# Patient Record
Sex: FEMALE | Race: WHITE | NOT HISPANIC OR LATINO | Employment: UNEMPLOYED | URBAN - METROPOLITAN AREA
[De-identification: names, ages, dates, MRNs, and addresses within clinical notes are randomized per-mention and may not be internally consistent; named-entity substitution may affect disease eponyms.]

---

## 2019-12-04 ENCOUNTER — HOSPITAL ENCOUNTER (EMERGENCY)
Facility: HOSPITAL | Age: 84
Discharge: HOME | End: 2019-12-04
Attending: EMERGENCY MEDICINE
Payer: COMMERCIAL

## 2019-12-04 VITALS
RESPIRATION RATE: 18 BRPM | TEMPERATURE: 97.9 F | SYSTOLIC BLOOD PRESSURE: 120 MMHG | DIASTOLIC BLOOD PRESSURE: 60 MMHG | HEART RATE: 87 BPM | WEIGHT: 144 LBS | OXYGEN SATURATION: 97 %

## 2019-12-04 DIAGNOSIS — R23.8 BLISTERS OF MULTIPLE SITES: ICD-10-CM

## 2019-12-04 DIAGNOSIS — R21 RASH: Primary | ICD-10-CM

## 2019-12-04 DIAGNOSIS — R23.8 BULLAE: ICD-10-CM

## 2019-12-04 PROCEDURE — 99283 EMERGENCY DEPT VISIT LOW MDM: CPT | Performed by: EMERGENCY MEDICINE

## 2019-12-04 PROCEDURE — 99282 EMERGENCY DEPT VISIT SF MDM: CPT

## 2019-12-04 RX ORDER — PREDNISONE 10 MG/1
40 TABLET ORAL DAILY
Qty: 40 TABLET | Refills: 0 | Status: SHIPPED | OUTPATIENT
Start: 2019-12-04 | End: 2019-12-20

## 2019-12-05 NOTE — ED ATTENDING ATTESTATION
Wilburn Ganser MD, saw and evaluated the patient  All available labs and X-rays were ordered by me or the resident and have been reviewed by myself  I discussed the patient with the resident / non-physician and agree with the resident's / non-physician practitioner's findings and plan as documented in the resident's / non-physician practicitioner's note, except where noted  At this point, I agree with the current assessment done in the ED  I was present during key portions of all procedures performed unless otherwise stated  Chief Complaint   Patient presents with    Blister     blisters over entire body for 4 months, using triamcinolone cream no relief, no fever     This is an 80 y/o F presenting with numular eczema with bullous pemphoid  She has been having this rash for months, itchy, all over her body  It's beefy red  There are lesions on the hands looking bullous  It's not particularly bothering her  No f/ch/n/v/cp/sob  No blood in stool  No problems having a BM  Denies any urinary tract infection symptoms (burning, itching, pain, blood, frequency)  Denies any upper respiratory tract infection symptoms (cough, congestion, rhinorrhea, sore throat)  No oral involvement  No dizzienss/LH  No new medications  She's actually not using her glaucoma medications b/c she thought it might be related  No tongue involvement  No wheezing  Exam looks okay except for areas on arms/legs looking like bullous with eczema  Will do steroids, discuss derm  No one else has similar  Owns 3 cats  No new meds  She is on a latanoprost              Vitals:    12/04/19 1933 12/04/19 2035 12/04/19 2153   BP: 157/80  120/60   BP Location: Left arm  Left arm   Pulse: 91  87   Resp: (!) 24  18   Temp: (!) 95 6 °F (35 3 °C) 97 9 °F (36 6 °C)    TempSrc: Tympanic Oral    SpO2: 97%  97%   Weight: 65 3 kg (144 lb)     - 13 point ROS was performed and all are normal unless stated in the history above     - Nursing note reviewed  Vitals reviewed  - Orders placed by myself and/or advanced practitioner / resident     - Previous chart was reviewed  - No language barrier    - History obtained from jeovany patient  - There are no limitations to the history obtained  - Critical care time: Not applicable for this patient  Code Status: No Order  Advance Directive and Living Will:      Power of :    POLST:      Final Diagnosis:  1  Rash    2  Blisters of multiple sites    3  Bullae        ED Course as of Dec 04 2208   Wed Dec 04, 2019   2107 This is an 81         Medications - No data to display  No orders to display     No orders of the defined types were placed in this encounter  Labs Reviewed - No data to display  Time reflects when diagnosis was documented in both MDM as applicable and the Disposition within this note     Time User Action Codes Description Comment    12/4/2019  9:54 PM Ellen Janus Add [R21] Rash     12/4/2019  9:54 PM Ellen Janus Add [R23 8] Blisters of multiple sites     12/4/2019  9:54 PM Ellen Janus Add [R23 8] Bullae       ED Disposition     ED Disposition Condition Date/Time Comment    Discharge Good Wed Dec 4, 2019  9:53 PM July Worthington discharge to home/self care  Follow-up Information     Follow up With Specialties Details Why Contact Info Additional Information    Vicente Abbasi MD Dermatology Call in 1 day The office should call you tomorrow for an appointment  [phone]  [facility] Emergency Department Emergency Medicine Go to  If symptoms worsen   [address]  [phone] [facility], [address] [place] [address], [address] [phone]        Patient's Medications   Discharge Prescriptions    PREDNISONE 10 MG TABLET    Take 4 tablets (40 mg total) by mouth daily for 16 days Take 40 mg for 4 days, 30 mg for 4 days, 20 mg for 4 days, and 10 mg for 4 days  Start Date: 12/4/2019 End Date: 12/20/2019       Order Dose: 40 mg       Quantity: 40 tablet    Refills: 0     No discharge procedures on file  None       Portions of the record may have been created with voice recognition software  Occasional wrong word or "sound a like" substitutions may have occurred due to the inherent limitations of voice recognition software  Read the chart carefully and recognize, using context, where substitutions have occurred      Electronically signed by:  Roberto Turner

## 2019-12-05 NOTE — ED PROVIDER NOTES
History  Chief Complaint   Patient presents with    Blister     blisters over entire body for 4 months, using triamcinolone cream no relief, no fever     28-year-old female with past medical history of coronary artery disease status post PCI, CVA, hypertension, hyperlipidemia, and borderline diabetes who is presenting with rash  Patient reports that since August, 4 months ago, she has had a rash  The rash initially began on her bilateral lower legs  It then spread to her upper legs and bilateral upper extremities  Patient reports that she has had frequent blistering, particularly on her hands and feet  The blisters are tense but will sometimes spontaneously open up  Patient states the blisters are painful but the rash itself is not  The rash is pruritic  Patient has been placed on multiple courses of oral corticosteroids as well as triamcinolone cream without improvement  She did have a punch biopsy performed which demonstrated findings consistent with possible eczema  Patient has not yet seen a dermatologist but does have an appointment for January  Patient reports she is having difficulty walking and doing activities of daily living due to discomfort related to her rash  Patient denies any oral, ocular, or vaginal involvement with the rash  Patient denies any fever, chills, night sweats, unintentional weight loss, chest pain, shortness of breath, abdominal pain, or any other complaints at this time  Patient denies any new medications other than medications she was placed on to treat the rash  She has had no exposures to new soaps, creams, or detergents  No other complaints on review of systems  None       Past Medical History:   Diagnosis Date    Cardiac disease     2 stents    Diabetes mellitus ([facility] [address] )     borderline    Hyperlipidemia     Hypertension     Stroke [facility])        History reviewed  No pertinent surgical history  History reviewed  No pertinent family history    I have reviewed and agree with the history as documented  Social History     Tobacco Use    Smoking status: Former Smoker   Substance Use Topics    Alcohol use: Not Currently    Drug use: Not on file        Review of Systems   Constitutional: Negative for diaphoresis, fever and unexpected weight change  HENT: Negative for congestion, rhinorrhea and sore throat  Eyes: Negative for pain, discharge and visual disturbance  Respiratory: Negative for cough, shortness of breath and wheezing  Cardiovascular: Negative for chest pain, palpitations and leg swelling  Gastrointestinal: Negative for abdominal pain, blood in stool, constipation, diarrhea, nausea and vomiting  Genitourinary: Negative for dysuria, flank pain and hematuria  Musculoskeletal: Negative for arthralgias and joint swelling  Skin: Positive for rash  Negative for wound  Allergic/Immunologic: Negative for environmental allergies and food allergies  Neurological: Negative for dizziness, seizures, weakness and numbness  Hematological: Negative for adenopathy  Psychiatric/Behavioral: Negative for confusion and hallucinations  Physical Exam  ED Triage Vitals [12/04/19 1933]   Temperature Pulse Respirations Blood Pressure SpO2   (!) 95 6 °F (35 3 °C) 91 (!) 24 157/80 97 %      Temp Source Heart Rate Source Patient Position - Orthostatic VS BP Location FiO2 (%)   Tympanic Monitor Standing Left arm --      Pain Score       No Pain             Orthostatic Vital Signs  Vitals:    12/04/19 1933 12/04/19 2153   BP: 157/80 120/60   Pulse: 91 87   Patient Position - Orthostatic VS: Standing Lying       Physical Exam   Constitutional: She is oriented to person, place, and time  She appears well-developed and well-nourished  No distress  HENT:   Head: Normocephalic and atraumatic  Right Ear: External ear normal    Left Ear: External ear normal    Eyes: Pupils are equal, round, and reactive to light   Conjunctivae and EOM are normal    Neck: Normal range of motion  Neck supple  Cardiovascular: Normal rate, regular rhythm and normal heart sounds  No murmur heard  Pulmonary/Chest: Effort normal and breath sounds normal  No respiratory distress  She has no wheezes  She has no rales  Abdominal: Soft  Bowel sounds are normal  She exhibits no distension  There is no tenderness  There is no guarding  Musculoskeletal: Normal range of motion  She exhibits no deformity  Neurological: She is alert and oriented to person, place, and time  No gross motor deficits noted  Cranial nerves II-XII are intact  Speech is fluent without dysarthria or aphasia  Skin: Skin is warm and dry  Capillary refill takes less than 2 seconds  Rash noted  There is erythema  There is an erythematous, blanching, raised rash involving the bilateral arms and bilateral legs  There are scattered areas of rash on the back and upper chest as well  There are tense bullae located on the bilateral hands and bilateral feet  Some of these bullae have broken  No mucous membrane involvement  Psychiatric: She has a normal mood and affect  Her behavior is normal    Nursing note and vitals reviewed  ED Medications  Medications - No data to display    Diagnostic Studies  Results Reviewed     None                 No orders to display         Procedures  Procedures      ED Course           Identification of Seniors at Risk      Most Recent Value   (ISAR) Identification of Seniors at Risk   Before the illness or injury that brought you to the Emergency, did you need someone to help you on a regular basis? 0 Filed at: 12/04/2019 1938   In the last 24 hours, have you needed more help than usual?  0 Filed at: 12/04/2019 1913   Have you been hospitalized for one or more nights during the past 6 months? 0 Filed at: 12/04/2019 1938   In general, do you see well?  0 Filed at: 12/04/2019 1938   In general, do you have serious problems with your memory?   0 Filed at: 12/04/2019 1938   Do you take more than three different medications every day? 1 Filed at: 12/04/2019 1938   ISAR Score  1 Filed at: 12/04/2019 1938                          MDM  Number of Diagnoses or Management Options  Blisters of multiple sites: established and worsening  Bullae: established and worsening  Rash: established and worsening  Diagnosis management comments:     Patient presented with rash as described above  The rash has been present for several months but had been worsening  Patient had tried multiple courses of oral corticosteroids and topical corticosteroids without relief  There was no mucous membrane involvement  Patient had numerous bullae located primarily on the hands and feet  These bullae were tense  Physical examination was otherwise significant for a diffuse erythematous, blanching, raised rash on the bilateral arms and bilateral legs  Clinical presentation highly concerning for bullous pemphigoid  Contacted Dr Kristin Leventhal from Dermatology to discuss the case  I sent him of photographs of the rash  He agreed with our assessment of the rash  He agreed that the patient would likely benefit from a course of oral corticosteroids  His office will call the patient tomorrow and make a follow-up appointment for later this week or early next week  This was discussed with the patient and her granddaughter at bedside  They verbalized understanding of the possible diagnosis, plan for treatment, need for follow-up, and return precautions         Amount and/or Complexity of Data Reviewed  Decide to obtain previous medical records or to obtain history from someone other than the patient: yes  Obtain history from someone other than the patient: yes  Review and summarize past medical records: yes  Discuss the patient with other providers: yes    Risk of Complications, Morbidity, and/or Mortality  Presenting problems: low  Diagnostic procedures: minimal  Management options: minimal    Patient Progress  Patient progress: stable        Disposition  Final diagnoses:   Rash   Blisters of multiple sites   Bullae     Time reflects when diagnosis was documented in both MDM as applicable and the Disposition within this note     Time User Action Codes Description Comment    12/4/2019  9:54 PM Joaquina Battles Add [R21] Rash     12/4/2019  9:54 PM Joaquina Battles Add [R23 8] Blisters of multiple sites     12/4/2019  9:54 PM Joaquina Battles Add [R23 8] Bullae       ED Disposition     ED Disposition Condition Date/Time Comment    Discharge Good Wed Dec 4, 2019  9:53 PM Ofe Shah discharge to home/self care  Follow-up Information     Follow up With Specialties Details Why Contact Info Additional Information    Ariana Whaley MD Dermatology Call in 1 day The office should call you tomorrow for an appointment  [address] Emergency Department Emergency Medicine Go to  If symptoms worsen  [address]  [phone]  ED, [address] [place] [address], [address]   [phone]          Discharge Medication List as of 12/4/2019  9:58 PM      START taking these medications    Details   predniSONE 10 mg tablet Take 4 tablets (40 mg total) by mouth daily for 16 days Take 40 mg for 4 days, 30 mg for 4 days, 20 mg for 4 days, and 10 mg for 4 days  , Starting Wed 12/4/2019, Until Fri 12/20/2019, Print           No discharge procedures on file  ED Provider  Attending physically available and evaluated Ofe Shah I managed the patient along with the ED Attending      Electronically Signed by         Meera Mckeon MD  12/05/19 2791

## 2019-12-05 NOTE — DISCHARGE INSTRUCTIONS
The Dermatology office should call you with an appointment tomorrow  If you do not hear from them by the late afternoon, call the office number listed above  Take prednisone as prescribed  I have prescribed a taper  The instructions are in the prescription  Return to the ER if you have any fever, shaking chills, blisters involving your lips or mouth, or any other concerning symptoms

## 2019-12-11 ENCOUNTER — CONSULT (OUTPATIENT)
Dept: DERMATOLOGY | Facility: CLINIC | Age: 84
End: 2019-12-11
Payer: COMMERCIAL

## 2019-12-11 VITALS — BODY MASS INDEX: 22.88 KG/M2 | WEIGHT: 134 LBS | HEIGHT: 64 IN | TEMPERATURE: 98.5 F

## 2019-12-11 DIAGNOSIS — L12.0 BULLOUS PEMPHIGOID: Primary | ICD-10-CM

## 2019-12-11 PROCEDURE — 11104 PUNCH BX SKIN SINGLE LESION: CPT | Performed by: DERMATOLOGY

## 2019-12-11 PROCEDURE — 88300 SURGICAL PATH GROSS: CPT | Performed by: STUDENT IN AN ORGANIZED HEALTH CARE EDUCATION/TRAINING PROGRAM

## 2019-12-11 PROCEDURE — 88350 IMFLUOR EA ADDL 1ANTB STN PX: CPT | Performed by: DERMATOLOGY

## 2019-12-11 PROCEDURE — 88305 TISSUE EXAM BY PATHOLOGIST: CPT | Performed by: STUDENT IN AN ORGANIZED HEALTH CARE EDUCATION/TRAINING PROGRAM

## 2019-12-11 PROCEDURE — 88346 IMFLUOR 1ST 1ANTB STAIN PX: CPT | Performed by: DERMATOLOGY

## 2019-12-11 PROCEDURE — 36415 COLL VENOUS BLD VENIPUNCTURE: CPT | Performed by: DERMATOLOGY

## 2019-12-11 PROCEDURE — 99204 OFFICE O/P NEW MOD 45 MIN: CPT | Performed by: DERMATOLOGY

## 2019-12-11 RX ORDER — HYDROXYZINE HYDROCHLORIDE 10 MG/1
TABLET, FILM COATED ORAL
Refills: 3 | COMMUNITY
Start: 2019-10-17

## 2019-12-11 RX ORDER — BRIMONIDINE TARTRATE, TIMOLOL MALEATE 2; 5 MG/ML; MG/ML
1 SOLUTION/ DROPS OPHTHALMIC EVERY 12 HOURS
COMMUNITY

## 2019-12-11 RX ORDER — SIMVASTATIN 20 MG
TABLET ORAL
Refills: 1 | COMMUNITY
Start: 2019-11-04

## 2019-12-11 RX ORDER — AMLODIPINE BESYLATE 5 MG/1
5 TABLET ORAL DAILY
COMMUNITY
End: 2020-06-06 | Stop reason: HOSPADM

## 2019-12-11 RX ORDER — METOPROLOL TARTRATE 50 MG/1
25 TABLET, FILM COATED ORAL
COMMUNITY
End: 2020-06-06 | Stop reason: HOSPADM

## 2019-12-11 RX ORDER — LISINOPRIL 2.5 MG/1
2.5 TABLET ORAL DAILY
COMMUNITY
Start: 2019-11-04 | End: 2020-06-06 | Stop reason: HOSPADM

## 2019-12-11 RX ORDER — TRIAMCINOLONE ACETONIDE 1 MG/G
CREAM TOPICAL
Refills: 0 | COMMUNITY
Start: 2019-11-14 | End: 2019-12-11

## 2019-12-11 NOTE — PATIENT INSTRUCTIONS
Based on a thorough discussion of this condition and the management approach to it (including a comprehensive discussion of the known risks, side effects and potential benefits of treatment), the patient (family) agrees to implement the following specific plan:   Obtain blood work   Biopsy was done today; will call with results with in 2 weeks    Continue the prednisone as prescribed   Continue the triamcinolone ointment twice a day     Start using Dove moisturizer bar soap in the shower    Use moisturizer like Cerave  Cream 3 times a day for the dry skin           What is bullous pemphigoid? Bullous pemphigoid is an autoimmune disease, meaning that your own body's immune system attacks components of the skin causing it to form blisters  It normally affects people over [de-identified]years old, but can also appear in young adults  There is no gender preference  What causes bullous pemphigoid? Many times there is no identifiable cause for bullous pemphigoid  Some medications such as anti-PD1 inhibitors used in chemotherapy have been implicated in the development of bullous pemphigoid  Genetic predisposition for autoimmune diseases is also a risk factor  For example, patients with psoriasis, especially those treated with phototherapy, are susceptible  There is also an association with neurological disease  Sometimes, injuries or infections may also trigger bullous pemphigoid  What are the symptoms of bullous pemphigoid? Bullous pemphigoid results in severe itchiness and large fluid-filled blisters that eventually rupture, forming crusted sores   Other symptoms include:   Nonspecific rash for several weeks before blisters appear   Red patches of skin resembling nummular dermatitis   Ring-shaped lesions on the skin    Smaller blisters (vesicles)   Clear or cloudy, yellowish or bloodstained blister fluid   Postinflammatory pigmentation   Milia in healed areas  The symptoms can be localized or widespread over the trunk and mearby limbs  Oral and genital involvement is less common  How do we diagnose bullous pemphigoid? The presence of typical signs and symptoms of bullous pemphigoid raise suspicion for the disease  Your dermatologist will likely confirm the diagnosis via skin biopsy of an early blister  When subject to direct immunofluorescence studies, antibodies against skin membranes are highlighted  Blood tests can also be done to check for circulating pemphigoid antibodies  How do we treat bullous pemphigoid? Since there is a wide spectrum of severity, many treatment options exist  First, if bullous pemphigoid is a medication reaction, then it is necessary to stop the offending medication  Other options are:   - Potent topical steroids for limited disease   - Moderate potency topical steroids to relieve dryness and itchiness  - Systemic steroids in severe and widespread blistering   - Antibiotics are used if there is a secondary bacterial infection   Most patients with bullous pemphigoid are treated with steroid tablets, usually prednisone  The dose is adjusted until the blisters have stopped appearing, which usually takes several weeks  The dose of prednisone is then slowly reduced over many months or years  As oral steroids have many undesirable side effects, other medications are added to ensure the lowest possible dose (aiming for 5 to 10 mg prednisone daily)  These other medications may include:   Tetracycline antibiotics, such as doxycycline   Nicotinamide   Dapsone   Methotrexate   Azathioprine   Mycophenolate   Intravenous immunoglobulin   Rituximab  If bullous pemphigoid is severe enough, hospitalization and admission to a burn unit may be warranted  SKIN PUNCH BIOPSY    Rationale for Procedure  A skin punch biopsy allows the dermatologist to further examine a lesion or rash under the microscope to potentially obtain a more specific diagnosis    It usually involves numbing the area with numbing medication and removing a small piece of skin  Usually, the area will be closed with sutures at the end of the procedure  Sutures usually need to be removed in 5 to 7 days of the biopsy was performed on the face or in 10 to 14 days if performed elsewhere on the body  Description of Procedure  We would like to perform a skin punch biopsy today  A local anesthetic, similar to the kind that a dentist uses when filling a cavity, will be injected with a very small needle into the skin area to be sampled  The injected skin and tissue underneath should go to sleep and become numbed so that no further pain should be felt  An instrument shaped like a tiny "cookie cutter" (i e , the punch biopsy instrument) will be used to cut a small round piece of skin and tissue from the area so that the sample may be taken and examined more closely under the microscope  A slight amount of bleeding will occur, but it is usually stopped with direct pressure, chemical cautery, and/or a pressure bandage; rarely, electrocautery and other means of intervention may be necessary to help stop the bleeding  A few sutures/stitches are usually applied to help aid in wound healing  Surgical Vaseline-type ointment will also applied after the procedure to help create a barrier between the wound and the outside world      Risks and Potential Complications  While the advantage of a skin punch biopsy is that it allows us to potentially examine the skin more closely under the microscope, there are some risks and potential complications that include but are not limited to the following:  - Bleeding  - Infection  - Pain  - Scar/keloid  - Skin discoloration  - Incomplete removal of the lesion or rash being sampled (in other words, this procedure is intended as a sampling and is not considered a definitive treatment)  - Recurrence of the lesion or rash being sampled  - Nerve Damage/Numbness/Loss of Function  - Allergic Reaction to Anesthesia  - Biopsies are diagnostic procedures and, based on findings, additional treatment or evaluation may be required (in other words, this procedure is intended as a sampling and is not considered a definitive treatment)  - Loss or destruction of the sample specimen could result in no additional findings  - The person at the microscope may not be able to provide additional information other than what we already know or suspect  What You Will Need to Do After the Procedure  1  Keep the area clean and dry  Try NOT to remove the bandage for the first 24 hours  2  Gently clean the area and apply Vaseline ointment (this is over the counter and not a prescription) to the biopsy site for up to 2 weeks  3  If any sutures were placed, return for suture removal as instructed (generally 1 week for the face, 2 weeks for the body)  4  Take Acetaminophen (Tylenol) for discomfort, if no contraindications  Do NOT take Ibuprofen or aspirin unless specifically told to do so by your Dermatologist because these medications can make bleeding worse  5  Call our office immediately for signs of infection: fever, chills, increased redness, warmth, tenderness, discomfort/pain, or pus or foul smell coming from the wound  If a small amount of bleeding is noticed, place a clean cloth over the area and apply firm pressure for ten minutes  Check the wound ONLY after 10 minutes of direct pressure; do not cheat and sneak a peak, as that does not count  If bleeding persists after 10 minutes of legitimate direct pressure, then try one more round of direct pressure for an additional 10 minutes to the area  Should the bleeding become heavier or not stop after the second attempt, call [facility] Dermatology directly at [phone] (SKIN) or, if after hours, go to your local Emergency Room/Emergency Department

## 2019-12-11 NOTE — PROGRESS NOTES
[address] [facility] Note     Patient Name: Mauricio Aleman  Encounter Date: 12/11/2019    Today's Chief Concerns:  Daniel Tran Concern #1:  blisters      Past Medical History:  Have you ever had or currently have any of the following medical conditions or treatments? · HIV/AIDS: No  · Hepatitis B: No  · Hepatitis C: No   · Diabetes: YES  · Tuberculosis: No  · Biologic Therapy/Chemotherapy: No  · Organ or Bone Marrow Transplantation: No  · Radiation Treatment: No  · Cancer (If Yes, which types)- No   · Hypertension: yes  · Stroke: yes  · Cardiac disease: yes  · Hyperlipidemia: yes      Have you ever had any of the following skin conditions? · Melanoma? (If Yes, please provide more detail)- No  · Basal Cell Carcinoma: No  · Squamous Cell Carcinoma: No  · Sebaceous Cell Carcinoma: No  · Merkel Cell Carcinoma: No  · Angiosarcoma: No  · Blistering Sunburns: No  · Eczema: No  · Psoriasis: No    Social History:    What is your current Smoking Status? Former smoker    What is/was your primary occupation? retired    What are your hobbies/past-times? Family history:  Do any of your "first degree relatives" (parent, brother, sister, or child) have any of the following conditions? · Melanoma? (If Yes, which relatives?) No  · Eczema: No  · Asthma: No  · Hay Fever/Seasonal Allergies: No  · Psoriasis: No  · Arthritis: No  · Thyroid Problems: No  · Lupus/Connective Tissue Disease: No  · Diabetes: No  · Stroke: No  · Blood Clots: No  · IBD/Crohn's/Ulcerative Colitis: No  · Vitiligo: No  · Scarring/Keloids: No  · Severe Acne: No  · Pancreatic Cancer: No  · Other known Skin Condition? If Yes, what condition and which relatives?   No    Current Medications:    Current Outpatient Medications:     amLODIPine (NORVASC) 5 mg tablet, Take 5 mg by mouth daily, Disp: , Rfl:     ASPIRIN 81 PO, Take 81 mg by mouth, Disp: , Rfl:     brimonidine-timolol (COMBIGAN) 0 2-0 5 %, Apply 1 drop to eye every 12 (twelve) hours, Disp: , Rfl:   hydrOXYzine HCL (ATARAX) 10 mg tablet, TAKE ONE TABLET BY MOUTH THREE TIMES A DAY AS NEEDED FOR ITCHING, Disp: , Rfl: 3    Latanoprost 0 005 % EMUL, Apply 1 drop to eye, Disp: , Rfl:     lisinopril (ZESTRIL) 2 5 mg tablet, Take 2 5 mg by mouth daily, Disp: , Rfl:     metFORMIN (GLUCOPHAGE) 500 mg tablet, Take 500 mg by mouth, Disp: , Rfl:     metoprolol tartrate (LOPRESSOR) 50 mg tablet, Take 25 mg by mouth, Disp: , Rfl:     mupirocin (BACTROBAN) 2 % ointment, APPLY TWICE DAILY TO SURGICAL SITE FOR 2 WEEKS, Disp: , Rfl: 0    predniSONE 10 mg tablet, Take 4 tablets (40 mg total) by mouth daily for 16 days Take 40 mg for 4 days, 30 mg for 4 days, 20 mg for 4 days, and 10 mg for 4 days  , Disp: 40 tablet, Rfl: 0    simvastatin (ZOCOR) 20 mg tablet, TAKE ONE-HALF TABLET BY MOUTH EVERY EVENING, Disp: , Rfl: 1    Specific Alerts:    Have you been seen by a [facility] Dermatologist in the last 3 years? No    Are you pregnant or planning to become pregnant? No    Are you currently or planning to be nursing or breast feeding? No    Allergies   Allergen Reactions    Losartan     Sulfa Antibiotics        May we call your Preferred Phone number to discuss your specific medical information? YES    May we leave a detailed message that includes your specific medical information? YES    Have you traveled outside of the [facility] in the past 3 months? No    Do you currently have a pacemaker or defibrillator? No    Do you have any artificial heart valves, joints, plates, screws, rods, stents, pins, etc? No   - If Yes, were any placed within the last 2 years? Do you require any medications prior to a surgical procedure? No   - If Yes, for which procedure? - If Yes, what medications to you require? Are you taking any medications that cause you to bleed more easily ("blood thinners") No    Have you ever experienced a rapid heartbeat with epinephrine?  No    Have you ever been treated with "gold" (gold sodium thiomalate) therapy? No    [facility] can help with wrinkles, "laugh lines," facial volume loss, "double chin," "love handles," age spots, and more  Are you interested in learning today about some of the skin enhancement procedures that we offer? (If Yes, please provide more detail) No    Review of Systems:  Have you recently had or currently have any of the following? · Fever or chills: No  · Night Sweats: No  · Headaches: No  · Weight Gain: No  · Weight Loss: No  · Blurry Vision: No  · Nausea: No  · Vomiting: No  · Diarrhea: No  · Blood in Stool: No  · Abdominal Pain: No  · Itchy Skin: No  · Painful Joints: No  · Swollen Joints: No  · Muscle Pain: No  · Irregular Mole: No  · Sun Burn: No  · Dry Skin: No  · Skin Color Changes: No  · Scar or Keloid: No  · Cold Sores/Fever Blisters: No  · Bacterial Infections/MRSA: No  · Anxiety: No  · Depression: No  · Suicidal or Homicidal Thoughts: No      PHYSICAL EXAM:      Was a chaperone (Derm Clinical Assistant) present for the entirety of the Physical Exam? YES    Did the Dermatology Team specifically ask and  the patient on the importance of a Full Skin Exam to be sure that nothing is missed clinically?  YES    Did the patient request or accept a Full Skin Exam?  YES    Did the patient specifically refuse to have the areas "under-the-bra" examined by the Dermatologist? No    Did the patient specifically refuse to have the areas "under-the-underwear" examined by the Dermatologist? No      CONSTITUTIONAL:   Vitals:    12/11/19 1455   Temp: 98 5 °F (36 9 °C)   Weight: 60 8 kg (134 lb)   Height: 5' 4" (1 626 m)           PSYCH: Normal mood and affect  EYES: Normal conjunctiva  ENT: Normal lips and oral mucosa  CARDIOVASCULAR: No edema  RESPIRATORY: Normal respirations  HEME/LYMPH/IMMUNO:  No regional lymphadenopathy except as noted below in ASSESSMENT AND PLAN BY DIAGNOSIS    FULL ORGAN SYSTEM SKIN EXAM (SKIN)  Hair, Scalp, Ears, Face Normal except as noted below in Assessment   Neck, Cervical Chain Nodes Normal except as noted below in Assessment   Right Arm/Hand/Fingers Normal except as noted below in Assessment   Left Arm/Hand/Fingers Normal except as noted below in Assessment   Chest/Breasts/Axillae Viewed areas Normal except as noted below in Assessment   Abdomen, Umbilicus Normal except as noted below in Assessment   Back/Spine Normal except as noted below in Assessment   Groin/Genitalia/Buttocks    Right Leg, Foot, Toes Normal except as noted below in Assessment   Left Leg, Foot, Toes Normal except as noted below in Assessment        ASSESSMENT AND PLAN BY DIAGNOSIS:    History of Present Condition:     Duration:  How long has this been an issue for you? o september   Location Affected:  Where on the body is this affecting you? o  palms and arms   Quality:  Is there any bleeding, pain, itch, burning/irritation, or redness associated with the skin lesion? o  blistering   Severity:  Describe any bleeding, pain, itch, burning/irritation, or redness on a scale of 1 to 10 (with 10 being the worst)  o  0   Timing:  Does this condition seem to be there pretty constantly or do you notice it more at specific times throughout the day?    o  better   Context:  Have you ever noticed that this condition seems to be associated with specific activities you do?    o  denies   Modifying Factors:    o Anything that seems to make the condition worse?    -  denies  o What have you tried to do to make the condition better?    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    Physical Exam:   Anatomic Location Affected:  Bilateral arm, legs , groin  Palms and bilateral soles show resoloving erosions   Morphological Description:  erythematous patches with healing bulla on palms and soles    Fading erythematous patches arms and thighs   Pertinent Positives:   Pertinent Negatives: Additional History of Present Condition:  Patient started with itching and small blisters and was seen at urgent care  Patient then went to the emergency room on 12/4/2019 and was given prednisone and triamcinolone cream with   Improvement on steroids  Process has been smoldering for months   Itching has been intense  Biopsy by Dr Shady Rodriguez showed eczemaous process with eosinophils  Assessment and Plan:  Based on a thorough discussion of this condition and the management approach to it (including a comprehensive discussion of the known risks, side effects and potential benefits of treatment), the patient (family) agrees to implement the following specific plan:   Obtain blood work for BP anitgens  Blood sugar must be monitored closely on presidnisone and was ordered today  I asked patient to continue BS meds   I consisider that this is more than eczematous dermatitis due to lack of prior history of eczema and due to prsence of large bulla of hands and feet  I note that eczemaous, eosinophilic spongiosis can precede bullous pemphigoid by weeks to month   Biopsy was done today; will call with results with in 2 weeks    Continue the prednisone taper as previously  prescribed   Continue the triamcinolone ointment twice a day     I noted to family that due to extent and severity of dermaititis a systemic immunosuppressant might be required if we can not control dermatitiis without continued use of high dose corticosteroids  What is bullous pemphigoid? Bullous pemphigoid is an autoimmune disease, meaning that your own body's immune system attacks components of the skin causing it to form blisters  It normally affects people over [de-identified]years old, but can also appear in young adults  There is no gender preference  What causes bullous pemphigoid? Many times there is no identifiable cause for bullous pemphigoid  Some medications such as anti-PD1 inhibitors used in chemotherapy have been implicated in the development of bullous pemphigoid  Genetic predisposition for autoimmune diseases is also a risk factor  For example, patients with psoriasis, especially those treated with phototherapy, are susceptible  There is also an association with neurological disease  Sometimes, injuries or infections may also trigger bullous pemphigoid  What are the symptoms of bullous pemphigoid? Bullous pemphigoid results in severe itchiness and large fluid-filled blisters that eventually rupture, forming crusted sores  Other symptoms include:   Nonspecific rash for several weeks before blisters appear   Red patches of skin resembling nummular dermatitis   Ring-shaped lesions on the skin    Smaller blisters (vesicles)   Clear or cloudy, yellowish or bloodstained blister fluid   Postinflammatory pigmentation   Milia in healed areas  The symptoms can be localized or widespread over the trunk and mearby limbs  Oral and genital involvement is less common  How do we diagnose bullous pemphigoid? The presence of typical signs and symptoms of bullous pemphigoid raise suspicion for the disease  Your dermatologist will likely confirm the diagnosis via skin biopsy of an early blister  When subject to direct immunofluorescence studies, antibodies against skin membranes are highlighted  Blood tests can also be done to check for circulating pemphigoid antibodies  How do we treat bullous pemphigoid? Since there is a wide spectrum of severity, many treatment options exist  First, if bullous pemphigoid is a medication reaction, then it is necessary to stop the offending medication   Other options are:   - Potent topical steroids for limited disease   - Moderate potency topical steroids to relieve dryness and itchiness  - Systemic steroids in severe and widespread blistering   - Antibiotics are used if there is a secondary bacterial infection   Most patients with bullous pemphigoid are treated with steroid tablets, usually prednisone  The dose is adjusted until the blisters have stopped appearing, which usually takes several weeks  The dose of prednisone is then slowly reduced over many months or years  As oral steroids have many undesirable side effects, other medications are added to ensure the lowest possible dose (aiming for 5 to 10 mg prednisone daily)  These other medications may include:   Tetracycline antibiotics, such as doxycycline   Nicotinamide   Dapsone   Methotrexate   Azathioprine   Mycophenolate   Intravenous immunoglobulin   Rituximab  If bullous pemphigoid is severe enough, hospitalization and admission to a burn unit may be warranted  PROCEDURE NOTE:  PUNCH BIOPSY    Pre-operative Diagnosis: bullous pemphigoid     Performing Physician: Dr Meyer    Anatomic Location; Clinical Description with size (cm); Pre-Op Diagnosis:     Right upper back erythematous patches with healing bulla: Rule out: bullous pemphigoid versus acute eczematous dermatis   Two separate areas were biopsied  One area of lesional skin for H&E and one area of perilesional skin for immunofluorescence  Anesthesia: 1% xylocaine with epi       Topical anesthesia: None       Indications: To indicate diagnosis and management plan  Procedure Details     Patient informed of the risks (including bleeding,scaring and infection) and benefits of the procedure explained  Verbal and written informed consent obtained  The area was prepped and draped in the usual fashion  Anesthesia was obtained with 1% lidocaine with epinephrine  The skin was then stretched perpendicular to the skin tension lines and a punch biopsy to an appropriate sampling depth was obtained with a 4 mm punch with a forceps and iris scissors  Hemostasis was obtained with 5-0 Ethilon x 2 sutures       Complications:  None      Specimen has been sent for review by Dermatopathology  Plan:  1  Instructed to keep the wound dry and covered for 24-48h and clean thereafter  2  Warning signs of infection were reviewed  3  Recommended that the patient use acetaminophen as needed for pain  4  Sutures if any should be removed in 14 days      Standard post-procedure care has been explained and has been included in written form within the patient's copy of Informed Consent          Scribe Attestation    I,:   Veeda am acting as a scribe while in the presence of the attending physician :        I,:   Suraj Lee MD personally performed the services described in this documentation    as scribed in my presence :

## 2019-12-12 ENCOUNTER — LAB (OUTPATIENT)
Dept: LAB | Facility: CLINIC | Age: 84
End: 2019-12-12
Payer: COMMERCIAL

## 2019-12-12 ENCOUNTER — TRANSCRIBE ORDERS (OUTPATIENT)
Dept: LAB | Facility: CLINIC | Age: 84
End: 2019-12-12

## 2019-12-12 DIAGNOSIS — L12.0 BULLOUS PEMPHIGOID: ICD-10-CM

## 2019-12-12 LAB — GLUCOSE P FAST SERPL-MCNC: 131 MG/DL (ref 65–99)

## 2019-12-12 PROCEDURE — 83516 IMMUNOASSAY NONANTIBODY: CPT

## 2019-12-12 PROCEDURE — 82947 ASSAY GLUCOSE BLOOD QUANT: CPT

## 2019-12-12 PROCEDURE — 36415 COLL VENOUS BLD VENIPUNCTURE: CPT

## 2019-12-12 NOTE — RESULT ENCOUNTER NOTE
Please call patient  ( no answer when I called )  Note that blood sugar is 131  Its a little high but not that bad during prednisone therapy  Remind her to take her antidiabetic drugs

## 2019-12-13 ENCOUNTER — TELEPHONE (OUTPATIENT)
Dept: DERMATOLOGY | Facility: CLINIC | Age: 84
End: 2019-12-13

## 2019-12-13 NOTE — TELEPHONE ENCOUNTER
Telephone call to pt  Spoke to pt  In regards above message  Pt is aware of lab results  Pt  Will call with any questions or concerns       ----- Message from Kerry Garcia MD sent at 12/12/2019 12:20 PM EST -----  Please call patient  ( no answer when I called )  Note that blood sugar is 131  Its a little high but not that bad during prednisone therapy  Remind her to take her antidiabetic drugs

## 2019-12-19 LAB
MISCELLANEOUS LAB TEST RESULT: NORMAL
MISCELLANEOUS LAB TEST RESULT: NORMAL

## 2019-12-23 ENCOUNTER — TELEPHONE (OUTPATIENT)
Dept: OTHER | Facility: OTHER | Age: 84
End: 2019-12-23

## 2019-12-23 ENCOUNTER — OFFICE VISIT (OUTPATIENT)
Dept: DERMATOLOGY | Facility: CLINIC | Age: 84
End: 2019-12-23
Payer: COMMERCIAL

## 2019-12-23 VITALS — HEIGHT: 65 IN | TEMPERATURE: 97.5 F | BODY MASS INDEX: 22.33 KG/M2 | WEIGHT: 134 LBS

## 2019-12-23 DIAGNOSIS — L12.0 BP (BULLOUS PEMPHIGOID): Primary | ICD-10-CM

## 2019-12-23 PROCEDURE — 99213 OFFICE O/P EST LOW 20 MIN: CPT | Performed by: DERMATOLOGY

## 2019-12-23 RX ORDER — MYCOPHENOLATE MOFETIL 500 MG/1
TABLET ORAL
Qty: 160 TABLET | Refills: 3 | Status: SHIPPED | OUTPATIENT
Start: 2019-12-23 | End: 2020-01-08 | Stop reason: SDUPTHER

## 2019-12-23 RX ORDER — PREDNISONE 10 MG/1
TABLET ORAL
Qty: 90 TABLET | Refills: 0 | Status: SHIPPED | OUTPATIENT
Start: 2019-12-23 | End: 2020-01-08

## 2019-12-23 NOTE — TELEPHONE ENCOUNTER
Hi,    Pharmacist called RE: prescription that was sent over late Monday afternoon for this pt     Medication needs prior authorization  Insurance requiring a call for more clinical information    Their number is [phone]    Thank you,  James Ledbetter

## 2019-12-23 NOTE — PATIENT INSTRUCTIONS
Assessment and Plan:  Based on a thorough discussion of this condition and the management approach to it (including a comprehensive discussion of the known risks, side effects and potential benefits of treatment), the patient (family) agrees to implement the following specific plan:   Continue Triamcinolone Ointment twice a day for 2 weeks straight as needed   Start Prednisone 10mg 3 tabs once daily   Start Cellcept 500 mg Take 1 tablet twice a day for 1 week, Increase to 2 tablets twice a day   Labs given to pt CBC, CMP Quantiferon Gold (Fasting blood work once a week on Mondays for 4 weeks)     What is bullous pemphigoid? Bullous pemphigoid is an autoimmune disease, meaning that your own body's immune system attacks components of the skin causing it to form blisters  It normally affects people over [de-identified]years old, but can also appear in young adults  There is no gender preference  What causes bullous pemphigoid? Many times there is no identifiable cause for bullous pemphigoid  Some medications such as anti-PD1 inhibitors used in chemotherapy have been implicated in the development of bullous pemphigoid  Genetic predisposition for autoimmune diseases is also a risk factor  For example, patients with psoriasis, especially those treated with phototherapy, are susceptible  There is also an association with neurological disease  Sometimes, injuries or infections may also trigger bullous pemphigoid  What are the symptoms of bullous pemphigoid? Bullous pemphigoid results in severe itchiness and large fluid-filled blisters that eventually rupture, forming crusted sores   Other symptoms include:   Nonspecific rash for several weeks before blisters appear   Red patches of skin resembling nummular dermatitis   Ring-shaped lesions on the skin    Smaller blisters (vesicles)   Clear or cloudy, yellowish or bloodstained blister fluid   Postinflammatory pigmentation   Milia in healed areas  The symptoms can be localized or widespread over the trunk and mearby limbs  Oral and genital involvement is less common  How do we diagnose bullous pemphigoid? The presence of typical signs and symptoms of bullous pemphigoid raise suspicion for the disease  Your dermatologist will likely confirm the diagnosis via skin biopsy of an early blister  When subject to direct immunofluorescence studies, antibodies against skin membranes are highlighted  Blood tests can also be done to check for circulating pemphigoid antibodies  How do we treat bullous pemphigoid? Since there is a wide spectrum of severity, many treatment options exist  First, if bullous pemphigoid is a medication reaction, then it is necessary to stop the offending medication  Other options are:   - Potent topical steroids for limited disease   - Moderate potency topical steroids to relieve dryness and itchiness  - Systemic steroids in severe and widespread blistering   - Antibiotics are used if there is a secondary bacterial infection   Most patients with bullous pemphigoid are treated with steroid tablets, usually prednisone  The dose is adjusted until the blisters have stopped appearing, which usually takes several weeks  The dose of prednisone is then slowly reduced over many months or years  As oral steroids have many undesirable side effects, other medications are added to ensure the lowest possible dose (aiming for 5 to 10 mg prednisone daily)  These other medications may include:   Tetracycline antibiotics, such as doxycycline   Nicotinamide   Dapsone   Methotrexate   Azathioprine   Mycophenolate   Intravenous immunoglobulin   Rituximab  If bullous pemphigoid is severe enough, hospitalization and admission to a burn unit may be warranted

## 2019-12-23 NOTE — PROGRESS NOTES
[facility] Follow Up Note    Patient Name: Brian Bright  Encounter Date: 12/23/2019    Today's Chief Concerns:  [facility] Concern #1:  F/U BP, S/P Bx      Current Medications:    Current Outpatient Medications:     amLODIPine (NORVASC) 5 mg tablet, Take 5 mg by mouth daily, Disp: , Rfl:     ASPIRIN 81 PO, Take 81 mg by mouth, Disp: , Rfl:     brimonidine-timolol (COMBIGAN) 0 2-0 5 %, Apply 1 drop to eye every 12 (twelve) hours, Disp: , Rfl:     hydrOXYzine HCL (ATARAX) 10 mg tablet, TAKE ONE TABLET BY MOUTH THREE TIMES A DAY AS NEEDED FOR ITCHING, Disp: , Rfl: 3    Latanoprost 0 005 % EMUL, Apply 1 drop to eye, Disp: , Rfl:     lisinopril (ZESTRIL) 2 5 mg tablet, Take 2 5 mg by mouth daily, Disp: , Rfl:     metFORMIN (GLUCOPHAGE) 500 mg tablet, Take 500 mg by mouth, Disp: , Rfl:     metoprolol tartrate (LOPRESSOR) 50 mg tablet, Take 25 mg by mouth, Disp: , Rfl:     mupirocin (BACTROBAN) 2 % ointment, APPLY TWICE DAILY TO SURGICAL SITE FOR 2 WEEKS, Disp: , Rfl: 0    simvastatin (ZOCOR) 20 mg tablet, TAKE ONE-HALF TABLET BY MOUTH EVERY EVENING, Disp: , Rfl: 1    triamcinolone (KENALOG) 0 1 % ointment, Apply topically twice a day to affected areas, Disp: 453 6 g, Rfl: 1    CONSTITUTIONAL:   Vitals:    12/23/19 1445   Temp: 97 5 °F (36 4 °C)   TempSrc: Tympanic   Weight: 60 8 kg (134 lb)   Height: 5' 4 8" (1 646 m)       Specific Alerts:    Have you been seen by a [facility] in the last 3 years? YES    Are you pregnant or planning to become pregnant? N/A    Are you currently or planning to be nursing or breast feeding? N/A    Allergies   Allergen Reactions    Losartan     Sulfa Antibiotics        May we call your Preferred Phone number to discuss your specific medical information? YES    May we leave a detailed message that includes your specific medical information? YES    Have you traveled outside of the [facility] in the past 3 months?  No    Do you currently have a pacemaker or defibrillator? No    Do you have any artificial heart valves, joints, plates, screws, rods, stents, pins, etc? YES   - If Yes, were any placed within the last 2 years? Do you require any medications prior to a surgical procedure? No   - If Yes, for which procedure? n/a   - If Yes, what medications to you require? n/a    Are you taking any medications that cause you to bleed more easily ("blood thinners") YES    Have you ever experienced a rapid heartbeat with epinephrine? No    Have you ever been treated with "gold" (gold sodium thiomalate) therapy? No    [facility] can help with wrinkles, "laugh lines," facial volume loss, "double chin," "love handles," age spots, and more  Are you interested in learning today about some of the skin enhancement procedures that we offer? (If Yes, please provide more detail) No    Review of Systems:  Have you recently had or currently have any of the following?     · Fever or chills: No  · Night Sweats: No  · Headaches: No  · Weight Gain: No  · Weight Loss: No  · Blurry Vision: No  · Nausea: No  · Vomiting: No  · Diarrhea: No  · Blood in Stool: No  · Abdominal Pain: No  · Itchy Skin: YES  · Painful Joints: No  · Swollen Joints: No  · Muscle Pain: No  · Irregular Mole: No  · Sun Burn: No  · Dry Skin: YES  · Skin Color Changes: No  · Scar or Keloid: No  · Cold Sores/Fever Blisters: No  · Bacterial Infections/MRSA: No  · Anxiety: No  · Depression: No  · Suicidal or Homicidal Thoughts: No      PSYCH: Normal mood and affect  EYES: Normal conjunctiva  ENT: Normal lips and oral mucosa  CARDIOVASCULAR: No edema  RESPIRATORY: Normal respirations  HEME/LYMPH/IMMUNO:  No regional lymphadenopathy except as noted below in ASSESSMENT AND PLAN BY DIAGNOSIS    FULL ORGAN SYSTEM SKIN EXAM (SKIN)  Hair, Scalp, Ears, Face Normal except as noted below in Assessment   Neck, Cervical Chain Nodes Normal except as noted below in Assessment   Right Arm/Hand/Fingers Normal except as noted below in Assessment   Left Arm/Hand/Fingers Normal except as noted below in Assessment   Chest/Breasts/Axillae Viewed areas Normal except as noted below in Assessment   Abdomen, Umbilicus Normal except as noted below in Assessment   Back/Spine Normal except as noted below in Assessment   Groin/Genitalia/Buttocks Viewed areas Normal except as noted below in Assessment   Right Leg, Foot, Toes Normal except as noted below in Assessment   Left Leg, Foot, Toes Normal except as noted below in Assessment     1  BULLOUS PEMPHIGOID    Physical Exam:   Anatomic Location Affected:  Arms and legs    Morphological Description:  Erythematous patches special on her legs and arms early signes of blisters    Pertinent Positives:   Pertinent Negatives: No lymphadenopathy    Additional History of Present Condition:  Located on the arms, legs and trunk  Reports itching and scratching  Was treating with Prednisone taper and Triamcinolone Ointment twice day  Patient reports slight improved, but started flaring x 1 week as oral steroids are tapered  Assessment and Plan:  Based on a thorough discussion of this condition and the management approach to it (including a comprehensive discussion of the known risks, side effects and potential benefits of treatment), the patient (family) agrees to implement the following specific plan:   Continue Triamcinolone Ointment twice a day for 2 weeksas needed   Start Prednisone 10mg 3 tabs once daily   Start Cellcept 500 mg Take 1 tablet twice a day for 1 week, Increase to 2 tablets twice a day   Labs given to pt CBC, CMP Quantiferon Gold (Fasting blood work once a week on Mondays for 4 weeks)    I DISCUSSED THAT DIAGNOSIS OF BULLOUS PEMPHIGOID IS CONFIRMED BY DIRECT AND INDIRECT FLUORESENCE  THE ABSCENSE OF BULLA ON H&E IS LIKELY DUE TO PARTIAL TREATMENT WITH STEROID    I DID DISCUSS THAT PEMPHIGOID IS A SERIOUS DISEASE REQUIRING LONG TERM ORAL STEROIDS AND IMMUNOSUPPRESSIVE AS STEROID SPARRING AGENT  LONG AND SHORT TERM SIDE EFFECTS OF STEROID ESPECIALLY BONE AND BLOOD SUGAR ELEVATION DISCUSSED  RISKS OF LONG TERM IMMUNOSUPPRESSION AND MALIGNANCY NOTE  What is bullous pemphigoid? Bullous pemphigoid is an autoimmune disease, meaning that your own body's immune system attacks components of the skin causing it to form blisters  It normally affects people over [de-identified]years old, but can also appear in young adults  There is no gender preference  What causes bullous pemphigoid? Many times there is no identifiable cause for bullous pemphigoid  Some medications such as anti-PD1 inhibitors used in chemotherapy have been implicated in the development of bullous pemphigoid  Genetic predisposition for autoimmune diseases is also a risk factor  For example, patients with psoriasis, especially those treated with phototherapy, are susceptible  There is also an association with neurological disease  Sometimes, injuries or infections may also trigger bullous pemphigoid  What are the symptoms of bullous pemphigoid? Bullous pemphigoid results in severe itchiness and large fluid-filled blisters that eventually rupture, forming crusted sores  Other symptoms include:   Nonspecific rash for several weeks before blisters appear   Red patches of skin resembling nummular dermatitis   Ring-shaped lesions on the skin    Smaller blisters (vesicles)   Clear or cloudy, yellowish or bloodstained blister fluid   Postinflammatory pigmentation   Milia in healed areas  The symptoms can be localized or widespread over the trunk and mearby limbs  Oral and genital involvement is less common  How do we diagnose bullous pemphigoid? The presence of typical signs and symptoms of bullous pemphigoid raise suspicion for the disease  Your dermatologist will likely confirm the diagnosis via skin biopsy of an early blister   When subject to direct immunofluorescence studies, antibodies against skin membranes are highlighted  Blood tests can also be done to check for circulating pemphigoid antibodies  How do we treat bullous pemphigoid? Since there is a wide spectrum of severity, many treatment options exist  First, if bullous pemphigoid is a medication reaction, then it is necessary to stop the offending medication  Other options are:   - Potent topical steroids for limited disease   - Moderate potency topical steroids to relieve dryness and itchiness  - Systemic steroids in severe and widespread blistering   - Antibiotics are used if there is a secondary bacterial infection   Most patients with bullous pemphigoid are treated with steroid tablets, usually prednisone  The dose is adjusted until the blisters have stopped appearing, which usually takes several weeks  The dose of prednisone is then slowly reduced over many months or years  As oral steroids have many undesirable side effects, other medications are added to ensure the lowest possible dose (aiming for 5 to 10 mg prednisone daily)  These other medications may include:   Tetracycline antibiotics, such as doxycycline   Nicotinamide   Dapsone   Methotrexate   Azathioprine   Mycophenolate   Intravenous immunoglobulin   Rituximab  If bullous pemphigoid is severe enough, hospitalization and admission to a burn unit may be warranted       Scribe Attestation    I,:   Milagro Moreno MA am acting as a scribe while in the presence of the attending physician :        I,:   Donna Rai MD personally performed the services described in this documentation    as scribed in my presence :

## 2019-12-23 NOTE — LETTER
December 23, 2019     [facility], [address]    Patient: Barrett Kidd   YOB: 1935   Date of Visit: 12/23/2019       Dear Dr Candy Green: Thank you for referring Chet Pinzon to me for evaluation  Below are my notes for this consultation  If you have questions, please do not hesitate to call me  I look forward to following your patient along with you           Sincerely,        Darren Conway MD        CC: No Recipients  Darren Conway MD  12/23/2019  6:30 PM  Incomplete  [facility] Follow Up Note    Patient Name: Barrett Kidd  Encounter Date: 12/23/2019    Today's Chief Concerns:  [facility] Concern #1:  F/U BP, S/P Bx      Current Medications:    Current Outpatient Medications:     amLODIPine (NORVASC) 5 mg tablet, Take 5 mg by mouth daily, Disp: , Rfl:     ASPIRIN 81 PO, Take 81 mg by mouth, Disp: , Rfl:     brimonidine-timolol (COMBIGAN) 0 2-0 5 %, Apply 1 drop to eye every 12 (twelve) hours, Disp: , Rfl:     hydrOXYzine HCL (ATARAX) 10 mg tablet, TAKE ONE TABLET BY MOUTH THREE TIMES A DAY AS NEEDED FOR ITCHING, Disp: , Rfl: 3    Latanoprost 0 005 % EMUL, Apply 1 drop to eye, Disp: , Rfl:     lisinopril (ZESTRIL) 2 5 mg tablet, Take 2 5 mg by mouth daily, Disp: , Rfl:     metFORMIN (GLUCOPHAGE) 500 mg tablet, Take 500 mg by mouth, Disp: , Rfl:     metoprolol tartrate (LOPRESSOR) 50 mg tablet, Take 25 mg by mouth, Disp: , Rfl:     mupirocin (BACTROBAN) 2 % ointment, APPLY TWICE DAILY TO SURGICAL SITE FOR 2 WEEKS, Disp: , Rfl: 0    simvastatin (ZOCOR) 20 mg tablet, TAKE ONE-HALF TABLET BY MOUTH EVERY EVENING, Disp: , Rfl: 1    triamcinolone (KENALOG) 0 1 % ointment, Apply topically twice a day to affected areas, Disp: 453 6 g, Rfl: 1    CONSTITUTIONAL:   Vitals:    12/23/19 1445   Temp: 97 5 °F (36 4 °C)   TempSrc: Tympanic   Weight: 60 8 kg (134 lb)   Height: 5' 4 8" (1 646 m)       Specific Alerts:    Have you been seen by a [facility] Dermatologist in the last 3 years? YES    Are you pregnant or planning to become pregnant? N/A    Are you currently or planning to be nursing or breast feeding? N/A    Allergies   Allergen Reactions    Losartan     Sulfa Antibiotics        May we call your Preferred Phone number to discuss your specific medical information? YES    May we leave a detailed message that includes your specific medical information? YES    Have you traveled outside of the [facility] in the past 3 months? No    Do you currently have a pacemaker or defibrillator? No    Do you have any artificial heart valves, joints, plates, screws, rods, stents, pins, etc? YES   - If Yes, were any placed within the last 2 years? Do you require any medications prior to a surgical procedure? No   - If Yes, for which procedure? n/a   - If Yes, what medications to you require? n/a    Are you taking any medications that cause you to bleed more easily ("blood thinners") YES    Have you ever experienced a rapid heartbeat with epinephrine? No    Have you ever been treated with "gold" (gold sodium thiomalate) therapy? No    [facility] can help with wrinkles, "laugh lines," facial volume loss, "double chin," "love handles," age spots, and more  Are you interested in learning today about some of the skin enhancement procedures that we offer? (If Yes, please provide more detail) No    Review of Systems:  Have you recently had or currently have any of the following?     · Fever or chills: No  · Night Sweats: No  · Headaches: No  · Weight Gain: No  · Weight Loss: No  · Blurry Vision: No  · Nausea: No  · Vomiting: No  · Diarrhea: No  · Blood in Stool: No  · Abdominal Pain: No  · Itchy Skin: YES  · Painful Joints: No  · Swollen Joints: No  · Muscle Pain: No  · Irregular Mole: No  · Sun Burn: No  · Dry Skin: YES  · Skin Color Changes: No  · Scar or Keloid: No  · Cold Sores/Fever Blisters: No  · Bacterial Infections/MRSA: No  · Anxiety: No  · Depression: No  · Suicidal or Homicidal Thoughts: No      PSYCH: Normal mood and affect  EYES: Normal conjunctiva  ENT: Normal lips and oral mucosa  CARDIOVASCULAR: No edema  RESPIRATORY: Normal respirations  HEME/LYMPH/IMMUNO:  No regional lymphadenopathy except as noted below in ASSESSMENT AND PLAN BY DIAGNOSIS    FULL ORGAN SYSTEM SKIN EXAM (SKIN)  Hair, Scalp, Ears, Face Normal except as noted below in Assessment   Neck, Cervical Chain Nodes Normal except as noted below in Assessment   Right Arm/Hand/Fingers Normal except as noted below in Assessment   Left Arm/Hand/Fingers Normal except as noted below in Assessment   Chest/Breasts/Axillae Viewed areas Normal except as noted below in Assessment   Abdomen, Umbilicus Normal except as noted below in Assessment   Back/Spine Normal except as noted below in Assessment   Groin/Genitalia/Buttocks Viewed areas Normal except as noted below in Assessment   Right Leg, Foot, Toes Normal except as noted below in Assessment   Left Leg, Foot, Toes Normal except as noted below in Assessment     1  BULLOUS PEMPHIGOID    Physical Exam:   Anatomic Location Affected:  Arms and legs    Morphological Description:  Erythematous patches special on her legs and arms early signes of blisters    Pertinent Positives:   Pertinent Negatives: No lymphadenopathy    Additional History of Present Condition:  Located on the arms, legs and trunk  Reports itching and scratching  Was treating with Prednisone taper and Triamcinolone Ointment twice day  Patient reports slight improved, but started flaring x 1 week as oral steroids are tapered      Assessment and Plan:  Based on a thorough discussion of this condition and the management approach to it (including a comprehensive discussion of the known risks, side effects and potential benefits of treatment), the patient (family) agrees to implement the following specific plan:   Continue Triamcinolone Ointment twice a day for 2 weeksas needed   Start Prednisone 10mg 3 tabs once daily   Start Cellcept 500 mg Take 1 tablet twice a day for 1 week, Increase to 2 tablets twice a day   Labs given to pt CBC, CMP Quantiferon Gold (Fasting blood work once a week on Mondays for 4 weeks)    I DISCUSSED THAT DIAGNOSIS OF BULLOUS PEMPHIGOID IS CONFIRMED BY DIRECT AND INDIRECT FLUORESENCE  THE ABSCENSE OF BULLA ON H&E IS LIKELY DUE TO PARTIAL TREATMENT WITH STEROID  I DID DISCUSS THAT PEMPHIGOID IS A SERIOUS DISEASE REQUIRING LONG TERM ORAL STEROIDS AND IMMUNOSUPPRESSIVE AS STEROID SPARRING AGENT  LONG AND SHORT TERM SIDE EFFECTS OF STEROID ESPECIALLY BONE AND BLOOD SUGAR ELEVATION DISCUSSED  RISKS OF LONG TERM IMMUNOSUPPRESSION AND MALIGNANCY NOTE  What is bullous pemphigoid? Bullous pemphigoid is an autoimmune disease, meaning that your own body's immune system attacks components of the skin causing it to form blisters  It normally affects people over [de-identified]years old, but can also appear in young adults  There is no gender preference  What causes bullous pemphigoid? Many times there is no identifiable cause for bullous pemphigoid  Some medications such as anti-PD1 inhibitors used in chemotherapy have been implicated in the development of bullous pemphigoid  Genetic predisposition for autoimmune diseases is also a risk factor  For example, patients with psoriasis, especially those treated with phototherapy, are susceptible  There is also an association with neurological disease  Sometimes, injuries or infections may also trigger bullous pemphigoid  What are the symptoms of bullous pemphigoid? Bullous pemphigoid results in severe itchiness and large fluid-filled blisters that eventually rupture, forming crusted sores   Other symptoms include:   Nonspecific rash for several weeks before blisters appear   Red patches of skin resembling nummular dermatitis   Ring-shaped lesions on the skin    Smaller blisters (vesicles)   Clear or cloudy, yellowish or bloodstained blister fluid   Postinflammatory pigmentation   Milia in healed areas  The symptoms can be localized or widespread over the trunk and mearby limbs  Oral and genital involvement is less common  How do we diagnose bullous pemphigoid? The presence of typical signs and symptoms of bullous pemphigoid raise suspicion for the disease  Your dermatologist will likely confirm the diagnosis via skin biopsy of an early blister  When subject to direct immunofluorescence studies, antibodies against skin membranes are highlighted  Blood tests can also be done to check for circulating pemphigoid antibodies  How do we treat bullous pemphigoid? Since there is a wide spectrum of severity, many treatment options exist  First, if bullous pemphigoid is a medication reaction, then it is necessary to stop the offending medication  Other options are:   - Potent topical steroids for limited disease   - Moderate potency topical steroids to relieve dryness and itchiness  - Systemic steroids in severe and widespread blistering   - Antibiotics are used if there is a secondary bacterial infection   Most patients with bullous pemphigoid are treated with steroid tablets, usually prednisone  The dose is adjusted until the blisters have stopped appearing, which usually takes several weeks  The dose of prednisone is then slowly reduced over many months or years  As oral steroids have many undesirable side effects, other medications are added to ensure the lowest possible dose (aiming for 5 to 10 mg prednisone daily)  These other medications may include:   Tetracycline antibiotics, such as doxycycline   Nicotinamide   Dapsone   Methotrexate   Azathioprine   Mycophenolate   Intravenous immunoglobulin   Rituximab  If bullous pemphigoid is severe enough, hospitalization and admission to a burn unit may be warranted       Scribe Attestation    I,:   Tin Scott am acting as a scribe while in the presence of the attending physician :        I,:   Yu Amin MD personally performed the services described in this documentation    as scribed in my presence :              Yu Amin MD  12/23/2019  6:24 PM  Sign at close encounter  [facility] Follow Up Note    Patient Name: Jason Gallardo  Encounter Date: 12/23/2019    Today's Chief Concerns:  [facility] Concern #1:  F/U BP, S/P Bx      Current Medications:    Current Outpatient Medications:     amLODIPine (NORVASC) 5 mg tablet, Take 5 mg by mouth daily, Disp: , Rfl:     ASPIRIN 81 PO, Take 81 mg by mouth, Disp: , Rfl:     brimonidine-timolol (COMBIGAN) 0 2-0 5 %, Apply 1 drop to eye every 12 (twelve) hours, Disp: , Rfl:     hydrOXYzine HCL (ATARAX) 10 mg tablet, TAKE ONE TABLET BY MOUTH THREE TIMES A DAY AS NEEDED FOR ITCHING, Disp: , Rfl: 3    Latanoprost 0 005 % EMUL, Apply 1 drop to eye, Disp: , Rfl:     lisinopril (ZESTRIL) 2 5 mg tablet, Take 2 5 mg by mouth daily, Disp: , Rfl:     metFORMIN (GLUCOPHAGE) 500 mg tablet, Take 500 mg by mouth, Disp: , Rfl:     metoprolol tartrate (LOPRESSOR) 50 mg tablet, Take 25 mg by mouth, Disp: , Rfl:     mupirocin (BACTROBAN) 2 % ointment, APPLY TWICE DAILY TO SURGICAL SITE FOR 2 WEEKS, Disp: , Rfl: 0    simvastatin (ZOCOR) 20 mg tablet, TAKE ONE-HALF TABLET BY MOUTH EVERY EVENING, Disp: , Rfl: 1    triamcinolone (KENALOG) 0 1 % ointment, Apply topically twice a day to affected areas, Disp: 453 6 g, Rfl: 1    CONSTITUTIONAL:   Vitals:    12/23/19 1445   Temp: 97 5 °F (36 4 °C)   TempSrc: Tympanic   Weight: 60 8 kg (134 lb)   Height: 5' 4 8" (1 646 m)       Specific Alerts:    Have you been seen by a [facility] D[facility]atologist in the last 3 years? YES    Are you pregnant or planning to become pregnant? N/A    Are you currently or planning to be nursing or breast feeding?  N/A    Allergies   Allergen Reactions    Losartan     Sulfa Antibiotics        May we call your Preferred Phone number to discuss your specific medical information? YES    May we leave a detailed message that includes your specific medical information? YES    Have you traveled outside of the [facility] in the past 3 months? No    Do you currently have a pacemaker or defibrillator? No    Do you have any artificial heart valves, joints, plates, screws, rods, stents, pins, etc? YES   - If Yes, were any placed within the last 2 years? Do you require any medications prior to a surgical procedure? No   - If Yes, for which procedure? n/a   - If Yes, what medications to you require? n/a    Are you taking any medications that cause you to bleed more easily ("blood thinners") YES    Have you ever experienced a rapid heartbeat with epinephrine? No    Have you ever been treated with "gold" (gold sodium thiomalate) therapy? No    [facility] can help with wrinkles, "laugh lines," facial volume loss, "double chin," "love handles," age spots, and more  Are you interested in learning today about some of the skin enhancement procedures that we offer? (If Yes, please provide more detail) No    Review of Systems:  Have you recently had or currently have any of the following?     · Fever or chills: No  · Night Sweats: No  · Headaches: No  · Weight Gain: No  · Weight Loss: No  · Blurry Vision: No  · Nausea: No  · Vomiting: No  · Diarrhea: No  · Blood in Stool: No  · Abdominal Pain: No  · Itchy Skin: YES  · Painful Joints: No  · Swollen Joints: No  · Muscle Pain: No  · Irregular Mole: No  · Sun Burn: No  · Dry Skin: YES  · Skin Color Changes: No  · Scar or Keloid: No  · Cold Sores/Fever Blisters: No  · Bacterial Infections/MRSA: No  · Anxiety: No  · Depression: No  · Suicidal or Homicidal Thoughts: No      PSYCH: Normal mood and affect  EYES: Normal conjunctiva  ENT: Normal lips and oral mucosa  CARDIOVASCULAR: No edema  RESPIRATORY: Normal respirations  HEME/LYMPH/IMMUNO:  No regional lymphadenopathy except as noted below in ASSESSMENT AND PLAN BY DIAGNOSIS    FULL ORGAN SYSTEM SKIN EXAM (SKIN)  Hair, Scalp, Ears, Face Normal except as noted below in Assessment   Neck, Cervical Chain Nodes Normal except as noted below in Assessment   Right Arm/Hand/Fingers Normal except as noted below in Assessment   Left Arm/Hand/Fingers Normal except as noted below in Assessment   Chest/Breasts/Axillae Viewed areas Normal except as noted below in Assessment   Abdomen, Umbilicus Normal except as noted below in Assessment   Back/Spine Normal except as noted below in Assessment   Groin/Genitalia/Buttocks Viewed areas Normal except as noted below in Assessment   Right Leg, Foot, Toes Normal except as noted below in Assessment   Left Leg, Foot, Toes Normal except as noted below in Assessment     1  BULLOUS PEMPHIGOID    Physical Exam:   Anatomic Location Affected:  Arms and legs    Morphological Description:  Erythematous patches special on her legs and arms early signes of blisters    Pertinent Positives:   Pertinent Negatives: No lymphadenopathy    Additional History of Present Condition:  Located on the arms, legs and trunk  Reports itching and scratching  Was treating with Prednisone taper and Triamcinolone Ointment twice day  Patient reports slight improved, but started flaring x 1 week  Assessment and Plan:  Based on a thorough discussion of this condition and the management approach to it (including a comprehensive discussion of the known risks, side effects and potential benefits of treatment), the patient (family) agrees to implement the following specific plan:   Continue Triamcinolone Ointment twice a day for 2 weeks straight as needed     Start Prednisone 10mg 3 tabs once daily   Start Cellcept 500 mg Take 1 tablet twice a day for 1 week, Increase to 2 tablets twice a day   Labs given to pt CBC, CMP Quantiferon Gold (Fasting blood work once a week on Mondays for 4 weeks)     What is bullous pemphigoid? Bullous pemphigoid is an autoimmune disease, meaning that your own body's immune system attacks components of the skin causing it to form blisters  It normally affects people over [de-identified]years old, but can also appear in young adults  There is no gender preference  What causes bullous pemphigoid? Many times there is no identifiable cause for bullous pemphigoid  Some medications such as anti-PD1 inhibitors used in chemotherapy have been implicated in the development of bullous pemphigoid  Genetic predisposition for autoimmune diseases is also a risk factor  For example, patients with psoriasis, especially those treated with phototherapy, are susceptible  There is also an association with neurological disease  Sometimes, injuries or infections may also trigger bullous pemphigoid  What are the symptoms of bullous pemphigoid? Bullous pemphigoid results in severe itchiness and large fluid-filled blisters that eventually rupture, forming crusted sores  Other symptoms include:   Nonspecific rash for several weeks before blisters appear   Red patches of skin resembling nummular dermatitis   Ring-shaped lesions on the skin    Smaller blisters (vesicles)   Clear or cloudy, yellowish or bloodstained blister fluid   Postinflammatory pigmentation   Milia in healed areas  The symptoms can be localized or widespread over the trunk and mearby limbs  Oral and genital involvement is less common  How do we diagnose bullous pemphigoid? The presence of typical signs and symptoms of bullous pemphigoid raise suspicion for the disease  Your dermatologist will likely confirm the diagnosis via skin biopsy of an early blister  When subject to direct immunofluorescence studies, antibodies against skin membranes are highlighted  Blood tests can also be done to check for circulating pemphigoid antibodies  How do we treat bullous pemphigoid?     Since there is a wide spectrum of severity, many treatment options exist  First, if bullous pemphigoid is a medication reaction, then it is necessary to stop the offending medication  Other options are:   - Potent topical steroids for limited disease   - Moderate potency topical steroids to relieve dryness and itchiness  - Systemic steroids in severe and widespread blistering   - Antibiotics are used if there is a secondary bacterial infection   Most patients with bullous pemphigoid are treated with steroid tablets, usually prednisone  The dose is adjusted until the blisters have stopped appearing, which usually takes several weeks  The dose of prednisone is then slowly reduced over many months or years  As oral steroids have many undesirable side effects, other medications are added to ensure the lowest possible dose (aiming for 5 to 10 mg prednisone daily)  These other medications may include:   Tetracycline antibiotics, such as doxycycline   Nicotinamide   Dapsone   Methotrexate   Azathioprine   Mycophenolate   Intravenous immunoglobulin   Rituximab  If bullous pemphigoid is severe enough, hospitalization and admission to a burn unit may be warranted       Scribe Attestation    I,:   Caity Barriga MA am acting as a scribe while in the presence of the attending physician :        I,:   Colten Bolaños MD personally performed the services described in this documentation    as scribed in my presence :

## 2019-12-24 NOTE — TELEPHONE ENCOUNTER
Spoke with patient advised medication went through prior authorization and was approved from 12/30/2018 to 12/31/2021  Called the pharmacy and advised medication was now approved and needs to process medication through insurance coverage

## 2019-12-30 ENCOUNTER — LAB (OUTPATIENT)
Dept: LAB | Facility: CLINIC | Age: 84
End: 2019-12-30
Payer: COMMERCIAL

## 2019-12-30 DIAGNOSIS — L12.0 BP (BULLOUS PEMPHIGOID): ICD-10-CM

## 2019-12-30 LAB
ALBUMIN SERPL BCP-MCNC: 3.6 G/DL (ref 3.5–5)
ALP SERPL-CCNC: 72 U/L (ref 46–116)
ALT SERPL W P-5'-P-CCNC: 26 U/L (ref 12–78)
ANION GAP SERPL CALCULATED.3IONS-SCNC: 9 MMOL/L (ref 4–13)
AST SERPL W P-5'-P-CCNC: 8 U/L (ref 5–45)
BASOPHILS # BLD AUTO: 0.03 THOUSANDS/ΜL (ref 0–0.1)
BASOPHILS NFR BLD AUTO: 0 % (ref 0–1)
BILIRUB SERPL-MCNC: 1.17 MG/DL (ref 0.2–1)
BUN SERPL-MCNC: 25 MG/DL (ref 5–25)
CALCIUM ALBUM COR SERPL-MCNC: 10.5 MG/DL (ref 8.3–10.1)
CALCIUM SERPL-MCNC: 10.2 MG/DL (ref 8.3–10.1)
CHLORIDE SERPL-SCNC: 103 MMOL/L (ref 100–108)
CO2 SERPL-SCNC: 25 MMOL/L (ref 21–32)
CREAT SERPL-MCNC: 1.18 MG/DL (ref 0.6–1.3)
EOSINOPHIL # BLD AUTO: 0.12 THOUSAND/ΜL (ref 0–0.61)
EOSINOPHIL NFR BLD AUTO: 1 % (ref 0–6)
ERYTHROCYTE [DISTWIDTH] IN BLOOD BY AUTOMATED COUNT: 12.9 % (ref 11.6–15.1)
GFR SERPL CREATININE-BSD FRML MDRD: 43 ML/MIN/1.73SQ M
GLUCOSE P FAST SERPL-MCNC: 97 MG/DL (ref 65–99)
HCT VFR BLD AUTO: 47 % (ref 34.8–46.1)
HGB BLD-MCNC: 15.8 G/DL (ref 11.5–15.4)
IMM GRANULOCYTES # BLD AUTO: 0.04 THOUSAND/UL (ref 0–0.2)
IMM GRANULOCYTES NFR BLD AUTO: 0 % (ref 0–2)
LYMPHOCYTES # BLD AUTO: 3.76 THOUSANDS/ΜL (ref 0.6–4.47)
LYMPHOCYTES NFR BLD AUTO: 32 % (ref 14–44)
MCH RBC QN AUTO: 30.9 PG (ref 26.8–34.3)
MCHC RBC AUTO-ENTMCNC: 33.6 G/DL (ref 31.4–37.4)
MCV RBC AUTO: 92 FL (ref 82–98)
MONOCYTES # BLD AUTO: 0.83 THOUSAND/ΜL (ref 0.17–1.22)
MONOCYTES NFR BLD AUTO: 7 % (ref 4–12)
NEUTROPHILS # BLD AUTO: 7.02 THOUSANDS/ΜL (ref 1.85–7.62)
NEUTS SEG NFR BLD AUTO: 60 % (ref 43–75)
NRBC BLD AUTO-RTO: 0 /100 WBCS
PLATELET # BLD AUTO: 270 THOUSANDS/UL (ref 149–390)
PMV BLD AUTO: 12.5 FL (ref 8.9–12.7)
POTASSIUM SERPL-SCNC: 4.5 MMOL/L (ref 3.5–5.3)
PROT SERPL-MCNC: 7.3 G/DL (ref 6.4–8.2)
RBC # BLD AUTO: 5.11 MILLION/UL (ref 3.81–5.12)
SODIUM SERPL-SCNC: 137 MMOL/L (ref 136–145)
WBC # BLD AUTO: 11.8 THOUSAND/UL (ref 4.31–10.16)

## 2019-12-30 PROCEDURE — 36415 COLL VENOUS BLD VENIPUNCTURE: CPT

## 2019-12-30 PROCEDURE — 80053 COMPREHEN METABOLIC PANEL: CPT

## 2019-12-30 PROCEDURE — 85025 COMPLETE CBC W/AUTO DIFF WBC: CPT

## 2019-12-30 PROCEDURE — 86480 TB TEST CELL IMMUN MEASURE: CPT

## 2019-12-30 NOTE — RESULT ENCOUNTER NOTE
I telephoned and spoke with patient  Labs discussed with patient and noted to be ok    Reviewed again how to use medications and follow up appointment confirmed

## 2019-12-31 LAB
GAMMA INTERFERON BACKGROUND BLD IA-ACNC: 0.05 IU/ML
M TB IFN-G BLD-IMP: NEGATIVE
M TB IFN-G CD4+ BCKGRND COR BLD-ACNC: -0.01 IU/ML
M TB IFN-G CD4+ BCKGRND COR BLD-ACNC: -0.01 IU/ML
MITOGEN IGNF BCKGRD COR BLD-ACNC: >10 IU/ML

## 2020-01-06 ENCOUNTER — LAB (OUTPATIENT)
Dept: LAB | Facility: CLINIC | Age: 85
End: 2020-01-06
Payer: COMMERCIAL

## 2020-01-06 DIAGNOSIS — L12.0 BP (BULLOUS PEMPHIGOID): ICD-10-CM

## 2020-01-06 LAB
ALBUMIN SERPL BCP-MCNC: 4.1 G/DL (ref 3.5–5)
ALP SERPL-CCNC: 59 U/L (ref 46–116)
ALT SERPL W P-5'-P-CCNC: 20 U/L (ref 12–78)
ANION GAP SERPL CALCULATED.3IONS-SCNC: 13 MMOL/L (ref 4–13)
AST SERPL W P-5'-P-CCNC: 8 U/L (ref 5–45)
BASOPHILS # BLD AUTO: 0.03 THOUSANDS/ΜL (ref 0–0.1)
BASOPHILS NFR BLD AUTO: 0 % (ref 0–1)
BILIRUB SERPL-MCNC: 1.08 MG/DL (ref 0.2–1)
BUN SERPL-MCNC: 21 MG/DL (ref 5–25)
CALCIUM SERPL-MCNC: 9.9 MG/DL (ref 8.3–10.1)
CHLORIDE SERPL-SCNC: 103 MMOL/L (ref 100–108)
CO2 SERPL-SCNC: 20 MMOL/L (ref 21–32)
CREAT SERPL-MCNC: 1.27 MG/DL (ref 0.6–1.3)
EOSINOPHIL # BLD AUTO: 0.02 THOUSAND/ΜL (ref 0–0.61)
EOSINOPHIL NFR BLD AUTO: 0 % (ref 0–6)
ERYTHROCYTE [DISTWIDTH] IN BLOOD BY AUTOMATED COUNT: 12.9 % (ref 11.6–15.1)
GFR SERPL CREATININE-BSD FRML MDRD: 39 ML/MIN/1.73SQ M
GLUCOSE P FAST SERPL-MCNC: 124 MG/DL (ref 65–99)
HCT VFR BLD AUTO: 48 % (ref 34.8–46.1)
HGB BLD-MCNC: 15.8 G/DL (ref 11.5–15.4)
IMM GRANULOCYTES # BLD AUTO: 0.06 THOUSAND/UL (ref 0–0.2)
IMM GRANULOCYTES NFR BLD AUTO: 1 % (ref 0–2)
LYMPHOCYTES # BLD AUTO: 3.84 THOUSANDS/ΜL (ref 0.6–4.47)
LYMPHOCYTES NFR BLD AUTO: 31 % (ref 14–44)
MCH RBC QN AUTO: 30.5 PG (ref 26.8–34.3)
MCHC RBC AUTO-ENTMCNC: 32.9 G/DL (ref 31.4–37.4)
MCV RBC AUTO: 93 FL (ref 82–98)
MONOCYTES # BLD AUTO: 0.94 THOUSAND/ΜL (ref 0.17–1.22)
MONOCYTES NFR BLD AUTO: 8 % (ref 4–12)
NEUTROPHILS # BLD AUTO: 7.56 THOUSANDS/ΜL (ref 1.85–7.62)
NEUTS SEG NFR BLD AUTO: 60 % (ref 43–75)
NRBC BLD AUTO-RTO: 0 /100 WBCS
PLATELET # BLD AUTO: 269 THOUSANDS/UL (ref 149–390)
PMV BLD AUTO: 11.9 FL (ref 8.9–12.7)
POTASSIUM SERPL-SCNC: 3.9 MMOL/L (ref 3.5–5.3)
PROT SERPL-MCNC: 7.5 G/DL (ref 6.4–8.2)
RBC # BLD AUTO: 5.18 MILLION/UL (ref 3.81–5.12)
SODIUM SERPL-SCNC: 136 MMOL/L (ref 136–145)
WBC # BLD AUTO: 12.45 THOUSAND/UL (ref 4.31–10.16)

## 2020-01-06 PROCEDURE — 85025 COMPLETE CBC W/AUTO DIFF WBC: CPT

## 2020-01-06 PROCEDURE — 80053 COMPREHEN METABOLIC PANEL: CPT

## 2020-01-06 PROCEDURE — 36415 COLL VENOUS BLD VENIPUNCTURE: CPT

## 2020-01-06 NOTE — RESULT ENCOUNTER NOTE
Discussed labs with patient   Followup scheduled later in week  Will need meds updated at that visit

## 2020-01-08 ENCOUNTER — OFFICE VISIT (OUTPATIENT)
Dept: DERMATOLOGY | Facility: CLINIC | Age: 85
End: 2020-01-08
Payer: COMMERCIAL

## 2020-01-08 VITALS — WEIGHT: 131 LBS | BODY MASS INDEX: 21.83 KG/M2 | HEIGHT: 65 IN | TEMPERATURE: 97.8 F

## 2020-01-08 DIAGNOSIS — L12.0 BULLOUS PEMPHIGOID: Primary | ICD-10-CM

## 2020-01-08 PROCEDURE — 99213 OFFICE O/P EST LOW 20 MIN: CPT | Performed by: DERMATOLOGY

## 2020-01-08 RX ORDER — PREDNISONE 10 MG/1
TABLET ORAL
Qty: 180 TABLET | Refills: 0 | Status: SHIPPED | OUTPATIENT
Start: 2020-01-08 | End: 2020-02-05 | Stop reason: SDUPTHER

## 2020-01-08 RX ORDER — MYCOPHENOLATE MOFETIL 500 MG/1
TABLET ORAL
Qty: 160 TABLET | Refills: 3 | Status: SHIPPED | OUTPATIENT
Start: 2020-01-08 | End: 2020-06-08 | Stop reason: SDUPTHER

## 2020-01-08 NOTE — PATIENT INSTRUCTIONS
Assessment and Plan:  Based on a thorough discussion of this condition and the management approach to it (including a comprehensive discussion of the known risks, side effects and potential benefits of treatment), the patient (family) agrees to implement the following specific plan:   Decrease Prednisone 10 mg, Take 2 pills daily by mouth until we see you again   Continue Mycophenolate 500 mg, take 2 tablets twice a day   Get CBC and differential labs done in 3 weeks    Get Comprehensive metabolic labs done in 3 weeks   Follow up in 4 weeks     What is bullous pemphigoid? Bullous pemphigoid is an autoimmune disease, meaning that your own body's immune system attacks components of the skin causing it to form blisters  It normally affects people over [de-identified]years old, but can also appear in young adults  There is no gender preference  What causes bullous pemphigoid? Many times there is no identifiable cause for bullous pemphigoid  Some medications such as anti-PD1 inhibitors used in chemotherapy have been implicated in the development of bullous pemphigoid  Genetic predisposition for autoimmune diseases is also a risk factor  For example, patients with psoriasis, especially those treated with phototherapy, are susceptible  There is also an association with neurological disease  Sometimes, injuries or infections may also trigger bullous pemphigoid  What are the symptoms of bullous pemphigoid? Bullous pemphigoid results in severe itchiness and large fluid-filled blisters that eventually rupture, forming crusted sores   Other symptoms include:   Nonspecific rash for several weeks before blisters appear   Red patches of skin resembling nummular dermatitis   Ring-shaped lesions on the skin    Smaller blisters (vesicles)   Clear or cloudy, yellowish or bloodstained blister fluid   Postinflammatory pigmentation   Milia in healed areas  The symptoms can be localized or widespread over the trunk and mearby limbs  Oral and genital involvement is less common  How do we diagnose bullous pemphigoid? The presence of typical signs and symptoms of bullous pemphigoid raise suspicion for the disease  Your dermatologist will likely confirm the diagnosis via skin biopsy of an early blister  When subject to direct immunofluorescence studies, antibodies against skin membranes are highlighted  Blood tests can also be done to check for circulating pemphigoid antibodies  How do we treat bullous pemphigoid? Since there is a wide spectrum of severity, many treatment options exist  First, if bullous pemphigoid is a medication reaction, then it is necessary to stop the offending medication  Other options are:   - Potent topical steroids for limited disease   - Moderate potency topical steroids to relieve dryness and itchiness  - Systemic steroids in severe and widespread blistering   - Antibiotics are used if there is a secondary bacterial infection   Most patients with bullous pemphigoid are treated with steroid tablets, usually prednisone  The dose is adjusted until the blisters have stopped appearing, which usually takes several weeks  The dose of prednisone is then slowly reduced over many months or years  As oral steroids have many undesirable side effects, other medications are added to ensure the lowest possible dose (aiming for 5 to 10 mg prednisone daily)  These other medications may include:   Tetracycline antibiotics, such as doxycycline   Nicotinamide   Dapsone   Methotrexate   Azathioprine   Mycophenolate   Intravenous immunoglobulin   Rituximab  If bullous pemphigoid is severe enough, hospitalization and admission to a burn unit may be warranted

## 2020-01-08 NOTE — PROGRESS NOTES
[facility] Follow Up Note    Patient Name: Rupal Giraldo  Encounter Date: 1/8/20    Today's Chief Concerns:  [facility] Concern #1:  Follow up Bullous Pemphigoid     Current Medications:    Current Outpatient Medications:     amLODIPine (NORVASC) 5 mg tablet, Take 5 mg by mouth daily, Disp: , Rfl:     ASPIRIN 81 PO, Take 81 mg by mouth, Disp: , Rfl:     brimonidine-timolol (COMBIGAN) 0 2-0 5 %, Apply 1 drop to eye every 12 (twelve) hours, Disp: , Rfl:     hydrOXYzine HCL (ATARAX) 10 mg tablet, TAKE ONE TABLET BY MOUTH THREE TIMES A DAY AS NEEDED FOR ITCHING, Disp: , Rfl: 3    Latanoprost 0 005 % EMUL, Apply 1 drop to eye, Disp: , Rfl:     lisinopril (ZESTRIL) 2 5 mg tablet, Take 2 5 mg by mouth daily, Disp: , Rfl:     metFORMIN (GLUCOPHAGE) 500 mg tablet, Take 500 mg by mouth, Disp: , Rfl:     metoprolol tartrate (LOPRESSOR) 50 mg tablet, Take 25 mg by mouth, Disp: , Rfl:     mupirocin (BACTROBAN) 2 % ointment, APPLY TWICE DAILY TO SURGICAL SITE FOR 2 WEEKS, Disp: , Rfl: 0    mycophenolate (CELLCEPT) 500 mg tablet, Take 1 tablet twice a day for 1 week, Increase to 2 tablets twice a day, Disp: 160 tablet, Rfl: 3    predniSONE 10 mg tablet, Take 3 tabs  once daily, Disp: 90 tablet, Rfl: 0    simvastatin (ZOCOR) 20 mg tablet, TAKE ONE-HALF TABLET BY MOUTH EVERY EVENING, Disp: , Rfl: 1    triamcinolone (KENALOG) 0 1 % ointment, Apply topically twice a day to affected areas, Disp: 453 6 g, Rfl: 1    CONSTITUTIONAL:   Vitals:    01/08/20 1010   Temp: 97 8 °F (36 6 °C)   TempSrc: Tympanic   Weight: 59 4 kg (131 lb)   Height: 5' 4 8" (1 646 m)       Specific Alerts:    Have you been seen by a [facility] Dermatologist in the last 3 years? YES    Are you pregnant or planning to become pregnant? No    Are you currently or planning to be nursing or breast feeding?  No    Allergies   Allergen Reactions    Losartan     Sulfa Antibiotics        May we call your Preferred Phone number to discuss your specific medical information? YES    May we leave a detailed message that includes your specific medical information? YES    Have you traveled outside of the [facility] in the past 3 months? No    Do you currently have a pacemaker or defibrillator? No    Do you have any artificial heart valves, joints, plates, screws, rods, stents, pins, etc? YES   - If Yes, were any placed within the last 2 years? Do you require any medications prior to a surgical procedure? No   - If Yes, for which procedure? n/a   - If Yes, what medications to you require? n/a    Are you taking any medications that cause you to bleed more easily ("blood thinners") YES    Have you ever experienced a rapid heartbeat with epinephrine? No    Have you ever been treated with "gold" (gold sodium thiomalate) therapy? No    [facility] can help with wrinkles, "laugh lines," facial volume loss, "double chin," "love handles," age spots, and more  Are you interested in learning today about some of the skin enhancement procedures that we offer? (If Yes, please provide more detail) No    Review of Systems:  Have you recently had or currently have any of the following?     · Fever or chills: No  · Night Sweats: No  · Headaches: No  · Weight Gain: No  · Weight Loss: No  · Blurry Vision: No  · Nausea: No  · Vomiting: No  · Diarrhea: No  · Blood in Stool: No  · Abdominal Pain: No  · Itchy Skin: YES  · Painful Joints: No  · Swollen Joints: No  · Muscle Pain: No  · Irregular Mole: No  · Sun Burn: No  · Dry Skin: YES  · Skin Color Changes: No  · Scar or Keloid: No  · Cold Sores/Fever Blisters: No  · Bacterial Infections/MRSA: No  · Anxiety: No  · Depression: No  · Suicidal or Homicidal Thoughts: No      PSYCH: Normal mood and affect  EYES: Normal conjunctiva  ENT: Normal lips and oral mucosa  CARDIOVASCULAR: No edema  RESPIRATORY: Normal respirations  HEME/LYMPH/IMMUNO:  No regional lymphadenopathy except as noted below in ASSESSMENT AND PLAN BY DIAGNOSIS    FULL ORGAN SYSTEM SKIN EXAM (SKIN)  Hair, Scalp, Ears, Face Normal except as noted below in Assessment   Neck, Cervical Chain Nodes Normal except as noted below in Assessment   Right Arm/Hand/Fingers Normal except as noted below in Assessment   Left Arm/Hand/Fingers Normal except as noted below in Assessment   Chest/Breasts/Axillae Viewed areas Normal except as noted below in Assessment   Abdomen, Umbilicus Normal except as noted below in Assessment   Back/Spine Normal except as noted below in Assessment   Groin/Genitalia/Buttocks Viewed areas Normal except as noted below in Assessment   Right Leg, Foot, Toes Normal except as noted below in Assessment   Left Leg, Foot, Toes Normal except as noted below in Assessment     1  FOLLOW UP BULLOUS PEMPHIGOID    Physical Exam:   Anatomic Location Affected: Whole body   Morphological Description:  Mild moderate erythema but no bulla or erosions   Pertinent Positives:   Pertinent Negatives: no lymphadenopathy     Additional History of Present Condition:  Located on the whole body  Assessment and Plan:  Based on a thorough discussion of this condition and the management approach to it (including a comprehensive discussion of the known risks, side effects and potential benefits of treatment), the patient (family) agrees to implement the following specific plan:   Decrease Prednisone 10 mg, Take 2 pills daily by mouth until we see you again   Continue Mycophenolate 500 mg, take 2 tablets twice a day   Get CBC and differential labs done in 3 weeks    Get Comprehensive metabolic labs done in 3 weeks   Follow up in 4 weeks     What is bullous pemphigoid? Bullous pemphigoid is an autoimmune disease, meaning that your own body's immune system attacks components of the skin causing it to form blisters  It normally affects people over [de-identified]years old, but can also appear in young adults  There is no gender preference      What causes bullous pemphigoid? Many times there is no identifiable cause for bullous pemphigoid  Some medications such as anti-PD1 inhibitors used in chemotherapy have been implicated in the development of bullous pemphigoid  Genetic predisposition for autoimmune diseases is also a risk factor  For example, patients with psoriasis, especially those treated with phototherapy, are susceptible  There is also an association with neurological disease  Sometimes, injuries or infections may also trigger bullous pemphigoid  What are the symptoms of bullous pemphigoid? Bullous pemphigoid results in severe itchiness and large fluid-filled blisters that eventually rupture, forming crusted sores  Other symptoms include:   Nonspecific rash for several weeks before blisters appear   Red patches of skin resembling nummular dermatitis   Ring-shaped lesions on the skin    Smaller blisters (vesicles)   Clear or cloudy, yellowish or bloodstained blister fluid   Postinflammatory pigmentation   Milia in healed areas  The symptoms can be localized or widespread over the trunk and mearby limbs  Oral and genital involvement is less common  How do we diagnose bullous pemphigoid? The presence of typical signs and symptoms of bullous pemphigoid raise suspicion for the disease  Your dermatologist will likely confirm the diagnosis via skin biopsy of an early blister  When subject to direct immunofluorescence studies, antibodies against skin membranes are highlighted  Blood tests can also be done to check for circulating pemphigoid antibodies  How do we treat bullous pemphigoid? Since there is a wide spectrum of severity, many treatment options exist  First, if bullous pemphigoid is a medication reaction, then it is necessary to stop the offending medication   Other options are:   - Potent topical steroids for limited disease   - Moderate potency topical steroids to relieve dryness and itchiness  - Systemic steroids in severe and widespread blistering   - Antibiotics are used if there is a secondary bacterial infection   Most patients with bullous pemphigoid are treated with steroid tablets, usually prednisone  The dose is adjusted until the blisters have stopped appearing, which usually takes several weeks  The dose of prednisone is then slowly reduced over many months or years  As oral steroids have many undesirable side effects, other medications are added to ensure the lowest possible dose (aiming for 5 to 10 mg prednisone daily)  These other medications may include:   Tetracycline antibiotics, such as doxycycline   Nicotinamide   Dapsone   Methotrexate   Azathioprine   Mycophenolate   Intravenous immunoglobulin   Rituximab  If bullous pemphigoid is severe enough, hospitalization and admission to a burn unit may be warranted       Scribe Attestation    I,:   Lindy Gr MA am acting as a scribe while in the presence of the attending physician :        I,:   Oscar Bloom MD personally performed the services described in this documentation    as scribed in my presence :

## 2020-01-29 ENCOUNTER — TRANSCRIBE ORDERS (OUTPATIENT)
Dept: LAB | Facility: CLINIC | Age: 85
End: 2020-01-29

## 2020-01-29 ENCOUNTER — LAB (OUTPATIENT)
Dept: LAB | Facility: CLINIC | Age: 85
End: 2020-01-29
Payer: COMMERCIAL

## 2020-01-29 DIAGNOSIS — L12.0 BULLOUS PEMPHIGOID: ICD-10-CM

## 2020-01-29 LAB
ALBUMIN SERPL BCP-MCNC: 3.8 G/DL (ref 3.5–5)
ALP SERPL-CCNC: 42 U/L (ref 46–116)
ALT SERPL W P-5'-P-CCNC: 22 U/L (ref 12–78)
ANION GAP SERPL CALCULATED.3IONS-SCNC: 8 MMOL/L (ref 4–13)
AST SERPL W P-5'-P-CCNC: 9 U/L (ref 5–45)
BASOPHILS # BLD AUTO: 0.05 THOUSANDS/ΜL (ref 0–0.1)
BASOPHILS NFR BLD AUTO: 0 % (ref 0–1)
BILIRUB SERPL-MCNC: 0.68 MG/DL (ref 0.2–1)
BUN SERPL-MCNC: 19 MG/DL (ref 5–25)
CALCIUM SERPL-MCNC: 9.3 MG/DL (ref 8.3–10.1)
CHLORIDE SERPL-SCNC: 100 MMOL/L (ref 100–108)
CO2 SERPL-SCNC: 28 MMOL/L (ref 21–32)
CREAT SERPL-MCNC: 1.04 MG/DL (ref 0.6–1.3)
EOSINOPHIL # BLD AUTO: 0.12 THOUSAND/ΜL (ref 0–0.61)
EOSINOPHIL NFR BLD AUTO: 1 % (ref 0–6)
ERYTHROCYTE [DISTWIDTH] IN BLOOD BY AUTOMATED COUNT: 13.7 % (ref 11.6–15.1)
GFR SERPL CREATININE-BSD FRML MDRD: 50 ML/MIN/1.73SQ M
GLUCOSE P FAST SERPL-MCNC: 95 MG/DL (ref 65–99)
HCT VFR BLD AUTO: 46.6 % (ref 34.8–46.1)
HGB BLD-MCNC: 15 G/DL (ref 11.5–15.4)
IMM GRANULOCYTES # BLD AUTO: 0.12 THOUSAND/UL (ref 0–0.2)
IMM GRANULOCYTES NFR BLD AUTO: 1 % (ref 0–2)
LYMPHOCYTES # BLD AUTO: 4.22 THOUSANDS/ΜL (ref 0.6–4.47)
LYMPHOCYTES NFR BLD AUTO: 36 % (ref 14–44)
MCH RBC QN AUTO: 29.8 PG (ref 26.8–34.3)
MCHC RBC AUTO-ENTMCNC: 32.2 G/DL (ref 31.4–37.4)
MCV RBC AUTO: 93 FL (ref 82–98)
MONOCYTES # BLD AUTO: 0.87 THOUSAND/ΜL (ref 0.17–1.22)
MONOCYTES NFR BLD AUTO: 8 % (ref 4–12)
NEUTROPHILS # BLD AUTO: 6.27 THOUSANDS/ΜL (ref 1.85–7.62)
NEUTS SEG NFR BLD AUTO: 54 % (ref 43–75)
NRBC BLD AUTO-RTO: 0 /100 WBCS
PLATELET # BLD AUTO: 252 THOUSANDS/UL (ref 149–390)
PMV BLD AUTO: 11.2 FL (ref 8.9–12.7)
POTASSIUM SERPL-SCNC: 4 MMOL/L (ref 3.5–5.3)
PROT SERPL-MCNC: 6.6 G/DL (ref 6.4–8.2)
RBC # BLD AUTO: 5.03 MILLION/UL (ref 3.81–5.12)
SODIUM SERPL-SCNC: 136 MMOL/L (ref 136–145)
WBC # BLD AUTO: 11.65 THOUSAND/UL (ref 4.31–10.16)

## 2020-01-29 PROCEDURE — 36415 COLL VENOUS BLD VENIPUNCTURE: CPT

## 2020-01-29 PROCEDURE — 85025 COMPLETE CBC W/AUTO DIFF WBC: CPT

## 2020-01-29 PROCEDURE — 80053 COMPREHEN METABOLIC PANEL: CPT

## 2020-01-29 NOTE — RESULT ENCOUNTER NOTE
Discussed and reviewed labs  Ok to continue meds  She notes no itching or blisters  Prednisone now 20mg per day

## 2020-02-05 ENCOUNTER — OFFICE VISIT (OUTPATIENT)
Dept: DERMATOLOGY | Facility: CLINIC | Age: 85
End: 2020-02-05
Payer: COMMERCIAL

## 2020-02-05 VITALS — HEIGHT: 64 IN | TEMPERATURE: 98.6 F | BODY MASS INDEX: 22.74 KG/M2 | WEIGHT: 133.2 LBS

## 2020-02-05 DIAGNOSIS — L12.0 BULLOUS PEMPHIGOID: Primary | ICD-10-CM

## 2020-02-05 PROCEDURE — 99213 OFFICE O/P EST LOW 20 MIN: CPT | Performed by: DERMATOLOGY

## 2020-02-05 RX ORDER — PREDNISONE 10 MG/1
TABLET ORAL
Qty: 180 TABLET | Refills: 0 | Status: SHIPPED | OUTPATIENT
Start: 2020-02-05 | End: 2020-03-04 | Stop reason: DRUGHIGH

## 2020-02-05 NOTE — PATIENT INSTRUCTIONS
1   FOLLOW UP BULLOUS PEMPHIGOID    Assessment and Plan:  Based on a thorough discussion of this condition and the management approach to it (including a comprehensive discussion of the known risks, side effects and potential benefits of treatment), the patient (family) agrees to implement the following specific plan:   Three Prednisone tablets a day for one week and then back to two tablets a day   Complete lab work one week prior to next office visit   Continue taking Mycophenolate as prescribed   Continue using Triamcinolone ointment

## 2020-02-05 NOTE — PROGRESS NOTES
[facility] Follow Up Note    Patient Name: Rupal Giraldo  Encounter Date: 2/5/2020    Today's Chief Concerns:  [facility] Concern #1:  Follow up Bullous Pemphigoid      Current Medications:    Current Outpatient Medications:     amLODIPine (NORVASC) 5 mg tablet, Take 5 mg by mouth daily, Disp: , Rfl:     ASPIRIN 81 PO, Take 81 mg by mouth, Disp: , Rfl:     brimonidine-timolol (COMBIGAN) 0 2-0 5 %, Apply 1 drop to eye every 12 (twelve) hours, Disp: , Rfl:     hydrOXYzine HCL (ATARAX) 10 mg tablet, TAKE ONE TABLET BY MOUTH THREE TIMES A DAY AS NEEDED FOR ITCHING, Disp: , Rfl: 3    Latanoprost 0 005 % EMUL, Apply 1 drop to eye, Disp: , Rfl:     lisinopril (ZESTRIL) 2 5 mg tablet, Take 2 5 mg by mouth daily, Disp: , Rfl:     metFORMIN (GLUCOPHAGE) 500 mg tablet, Take 500 mg by mouth, Disp: , Rfl:     metoprolol tartrate (LOPRESSOR) 50 mg tablet, Take 25 mg by mouth, Disp: , Rfl:     mupirocin (BACTROBAN) 2 % ointment, APPLY TWICE DAILY TO SURGICAL SITE FOR 2 WEEKS, Disp: , Rfl: 0    mycophenolate (CELLCEPT) 500 mg tablet, Take 1 tablet twice a day for 1 week, Increase to 2 tablets twice a day, Disp: 160 tablet, Rfl: 3    predniSONE 10 mg tablet, Take 2 pills daily by mouth until we see you again  , Disp: 180 tablet, Rfl: 0    simvastatin (ZOCOR) 20 mg tablet, TAKE ONE-HALF TABLET BY MOUTH EVERY EVENING, Disp: , Rfl: 1    triamcinolone (KENALOG) 0 1 % ointment, Apply topically twice a day to affected areas, Disp: 453 6 g, Rfl: 1    CONSTITUTIONAL:   Vitals:    02/05/20 0912   Temp: 98 6 °F (37 °C)   Weight: 60 4 kg (133 lb 3 2 oz)   Height: 5' 4" (1 626 m)         Specific Alerts:    Have you been seen by a [facility] Dermatologist in the last 3 years? YES    Are you pregnant or planning to become pregnant? No    Are you currently or planning to be nursing or breast feeding?  No    Allergies   Allergen Reactions    Losartan     Sulfa Antibiotics        May we call your Preferred Phone number to discuss your specific medical information? YES    May we leave a detailed message that includes your specific medical information? YES    Have you traveled outside of the [facility] in the past 3 months? No    Do you currently have a pacemaker or defibrillator? No    Do you have any artificial heart valves, joints, plates, screws, rods, stents, pins, etc? No   - If Yes, were any placed within the last 2 years? Do you require any medications prior to a surgical procedure? No   - If Yes, for which procedure? n/a   - If Yes, what medications to you require? n/a    Are you taking any medications that cause you to bleed more easily ("blood thinners") YES    Have you ever experienced a rapid heartbeat with epinephrine? No    Have you ever been treated with "gold" (gold sodium thiomalate) therapy? No    [facility] can help with wrinkles, "laugh lines," facial volume loss, "double chin," "love handles," age spots, and more  Are you interested in learning today about some of the skin enhancement procedures that we offer? (If Yes, please provide more detail) No    Review of Systems:  Have you recently had or currently have any of the following?     · Fever or chills: No  · Night Sweats: No  · Headaches: No  · Weight Gain: No  · Weight Loss: No  · Blurry Vision: No  · Nausea: No  · Vomiting: No  · Diarrhea: No  · Blood in Stool: No  · Abdominal Pain: No  · Itchy Skin: No  · Painful Joints: No  · Swollen Joints: No  · Muscle Pain: No  · Irregular Mole: No  · Sun Burn: No  · Dry Skin: No  · Skin Color Changes: No  · Scar or Keloid: No  · Cold Sores/Fever Blisters: No  · Bacterial Infections/MRSA: No  · Anxiety: No  · Depression: No  · Suicidal or Homicidal Thoughts: No      PSYCH: Normal mood and affect  EYES: Normal conjunctiva  ENT: Normal lips and oral mucosa  CARDIOVASCULAR: No edema  RESPIRATORY: Normal respirations  HEME/LYMPH/IMMUNO:  No regional lymphadenopathy except as noted below in ASSESSMENT AND PLAN BY DIAGNOSIS    FULL ORGAN SYSTEM SKIN EXAM (SKIN)  Right Leg, Foot, Toes Normal except as noted below in Assessment   Left Leg, Foot, Toes Normal except as noted below in Assessment       1  FOLLOW UP BULLOUS PEMPHIGOID  Physical Exam:   Anatomic Location Affected:  Left foot, right hip  Fine papular erythema  No bulla   Morphological Description:  Papular erythema Fine papular erythema  No bulla  Skin is otherwise clear  Labs normal   Patient started taking Metformin prior to rash returning  No mucous membrane lesions  Assessment and Plan:  Based on a thorough discussion of this condition and the management approach to it (including a comprehensive discussion of the known risks, side effects and potential benefits of treatment), the patient (family) agrees to implement the following specific plan:   Three Prednisone tablets a day for one week and then back to two tablets a day   Complete lab work one week prior to next office visit   Continue taking Mycophenolate as prescribed   Continue using Triamcinolone ointment   Clinically there has been a mild flaring  And will need to back track a bit on steroid until  Mycophenolate exerts benefit  Continue monthly labs and followup in one month    Scribe Attestation    I,:   Carlota Lockett am acting as a scribe while in the presence of the attending physician :        I,:   Candelario Sahni MD personally performed the services described in this documentation    as scribed in my presence :

## 2020-02-26 ENCOUNTER — LAB (OUTPATIENT)
Dept: LAB | Facility: CLINIC | Age: 85
End: 2020-02-26
Payer: COMMERCIAL

## 2020-02-26 DIAGNOSIS — L12.0 BULLOUS PEMPHIGOID: ICD-10-CM

## 2020-02-26 LAB
ALBUMIN SERPL BCP-MCNC: 3.8 G/DL (ref 3.5–5)
ALP SERPL-CCNC: 41 U/L (ref 46–116)
ALT SERPL W P-5'-P-CCNC: 25 U/L (ref 12–78)
ANION GAP SERPL CALCULATED.3IONS-SCNC: 6 MMOL/L (ref 4–13)
AST SERPL W P-5'-P-CCNC: 11 U/L (ref 5–45)
BASOPHILS # BLD AUTO: 0.06 THOUSANDS/ΜL (ref 0–0.1)
BASOPHILS NFR BLD AUTO: 1 % (ref 0–1)
BILIRUB SERPL-MCNC: 0.98 MG/DL (ref 0.2–1)
BUN SERPL-MCNC: 22 MG/DL (ref 5–25)
CALCIUM SERPL-MCNC: 9.5 MG/DL (ref 8.3–10.1)
CHLORIDE SERPL-SCNC: 101 MMOL/L (ref 100–108)
CO2 SERPL-SCNC: 27 MMOL/L (ref 21–32)
CREAT SERPL-MCNC: 1.03 MG/DL (ref 0.6–1.3)
EOSINOPHIL # BLD AUTO: 0.08 THOUSAND/ΜL (ref 0–0.61)
EOSINOPHIL NFR BLD AUTO: 1 % (ref 0–6)
ERYTHROCYTE [DISTWIDTH] IN BLOOD BY AUTOMATED COUNT: 14.5 % (ref 11.6–15.1)
GFR SERPL CREATININE-BSD FRML MDRD: 50 ML/MIN/1.73SQ M
GLUCOSE P FAST SERPL-MCNC: 82 MG/DL (ref 65–99)
HCT VFR BLD AUTO: 43.3 % (ref 34.8–46.1)
HGB BLD-MCNC: 14.3 G/DL (ref 11.5–15.4)
IMM GRANULOCYTES # BLD AUTO: 0.13 THOUSAND/UL (ref 0–0.2)
IMM GRANULOCYTES NFR BLD AUTO: 1 % (ref 0–2)
LYMPHOCYTES # BLD AUTO: 3.55 THOUSANDS/ΜL (ref 0.6–4.47)
LYMPHOCYTES NFR BLD AUTO: 35 % (ref 14–44)
MCH RBC QN AUTO: 30.6 PG (ref 26.8–34.3)
MCHC RBC AUTO-ENTMCNC: 33 G/DL (ref 31.4–37.4)
MCV RBC AUTO: 93 FL (ref 82–98)
MONOCYTES # BLD AUTO: 0.76 THOUSAND/ΜL (ref 0.17–1.22)
MONOCYTES NFR BLD AUTO: 8 % (ref 4–12)
NEUTROPHILS # BLD AUTO: 5.57 THOUSANDS/ΜL (ref 1.85–7.62)
NEUTS SEG NFR BLD AUTO: 54 % (ref 43–75)
NRBC BLD AUTO-RTO: 0 /100 WBCS
PLATELET # BLD AUTO: 259 THOUSANDS/UL (ref 149–390)
PMV BLD AUTO: 11.1 FL (ref 8.9–12.7)
POTASSIUM SERPL-SCNC: 3.8 MMOL/L (ref 3.5–5.3)
PROT SERPL-MCNC: 6.7 G/DL (ref 6.4–8.2)
RBC # BLD AUTO: 4.67 MILLION/UL (ref 3.81–5.12)
SODIUM SERPL-SCNC: 134 MMOL/L (ref 136–145)
WBC # BLD AUTO: 10.15 THOUSAND/UL (ref 4.31–10.16)

## 2020-02-26 PROCEDURE — 85025 COMPLETE CBC W/AUTO DIFF WBC: CPT

## 2020-02-26 PROCEDURE — 80053 COMPREHEN METABOLIC PANEL: CPT

## 2020-02-26 PROCEDURE — 36415 COLL VENOUS BLD VENIPUNCTURE: CPT

## 2020-02-26 NOTE — RESULT ENCOUNTER NOTE
Reviewed labs ok  Notes blistering is quiet  Continue prednisone 20mg daily   Mycophenolate 1gm BID

## 2020-03-04 ENCOUNTER — OFFICE VISIT (OUTPATIENT)
Dept: DERMATOLOGY | Facility: CLINIC | Age: 85
End: 2020-03-04
Payer: COMMERCIAL

## 2020-03-04 DIAGNOSIS — L12.0 BULLOUS PEMPHIGOID: ICD-10-CM

## 2020-03-04 PROCEDURE — 99213 OFFICE O/P EST LOW 20 MIN: CPT | Performed by: DERMATOLOGY

## 2020-03-04 RX ORDER — PREDNISONE 1 MG/1
5 TABLET ORAL DAILY
Qty: 90 TABLET | Refills: 0 | Status: SHIPPED | OUTPATIENT
Start: 2020-03-04 | End: 2020-04-07

## 2020-03-04 NOTE — PROGRESS NOTES
[facility] Follow Up Note    Patient Name: Mar Ji  Encounter Date: 34/2020    Today's Chief Concerns:  [facility] Concern #1:  F/u bp    Current Medications:    Current Outpatient Medications:     amLODIPine (NORVASC) 5 mg tablet, Take 5 mg by mouth daily, Disp: , Rfl:     ASPIRIN 81 PO, Take 81 mg by mouth, Disp: , Rfl:     brimonidine-timolol (COMBIGAN) 0 2-0 5 %, Apply 1 drop to eye every 12 (twelve) hours, Disp: , Rfl:     hydrOXYzine HCL (ATARAX) 10 mg tablet, TAKE ONE TABLET BY MOUTH THREE TIMES A DAY AS NEEDED FOR ITCHING, Disp: , Rfl: 3    Latanoprost 0 005 % EMUL, Apply 1 drop to eye, Disp: , Rfl:     lisinopril (ZESTRIL) 2 5 mg tablet, Take 2 5 mg by mouth daily, Disp: , Rfl:     metFORMIN (GLUCOPHAGE) 500 mg tablet, Take 500 mg by mouth, Disp: , Rfl:     metoprolol tartrate (LOPRESSOR) 50 mg tablet, Take 25 mg by mouth, Disp: , Rfl:     mupirocin (BACTROBAN) 2 % ointment, APPLY TWICE DAILY TO SURGICAL SITE FOR 2 WEEKS, Disp: , Rfl: 0    mycophenolate (CELLCEPT) 500 mg tablet, Take 1 tablet twice a day for 1 week, Increase to 2 tablets twice a day, Disp: 160 tablet, Rfl: 3    predniSONE 10 mg tablet, Take 3 pills daily by mouth for one week then 2 tabs once daily, Disp: 180 tablet, Rfl: 0    simvastatin (ZOCOR) 20 mg tablet, TAKE ONE-HALF TABLET BY MOUTH EVERY EVENING, Disp: , Rfl: 1    triamcinolone (KENALOG) 0 1 % ointment, Apply topically twice a day to affected areas, Disp: 453 6 g, Rfl: 1    CONSTITUTIONAL:   There were no vitals filed for this visit  Specific Alerts:    Have you been seen by a [facility] in the last 3 years? YES    Are you pregnant or planning to become pregnant? N/A    Are you currently or planning to be nursing or breast feeding? N/A    Allergies   Allergen Reactions    Losartan     Sulfa Antibiotics        May we call your Preferred Phone number to discuss your specific medical information?  YES    May we leave a detailed message that includes your specific medical information? YES    Have you traveled outside of the [facility] in the past 3 months? No    Do you currently have a pacemaker or defibrillator? No    Do you have any artificial heart valves, joints, plates, screws, rods, stents, pins, etc? YES   - If Yes, were any placed within the last 2 years? 2018- 2 stents    Do you require any medications prior to a surgical procedure? No   - If Yes, for which procedure? - If Yes, what medications to you require? Are you taking any medications that cause you to bleed more easily ("blood thinners") YES, 81mg ASA    Have you ever experienced a rapid heartbeat with epinephrine? No    Have you ever been treated with "gold" (gold sodium thiomalate) therapy? No    [facility] can help with wrinkles, "laugh lines," facial volume loss, "double chin," "love handles," age spots, and more  Are you interested in learning today about some of the skin enhancement procedures that we offer? (If Yes, please provide more detail) No    Review of Systems:  Have you recently had or currently have any of the following?     · Fever or chills: No  · Night Sweats: No  · Headaches: No  · Weight Gain: No  · Weight Loss: No  · Blurry Vision: No  · Nausea: No  · Vomiting: No  · Diarrhea: No  · Blood in Stool: No  · Abdominal Pain: No  · Itchy Skin: No  · Painful Joints: No  · Swollen Joints: No  · Muscle Pain: No  · Irregular Mole: No  · Sun Burn: No  · Dry Skin: YES  · Skin Color Changes: YES  · Scar or Keloid: No  · Cold Sores/Fever Blisters: No  · Bacterial Infections/MRSA: No  · Anxiety: No  · Depression: No  · Suicidal or Homicidal Thoughts: No      PSYCH: Normal mood and affect  EYES: Normal conjunctiva  ENT: Normal lips and oral mucosa  CARDIOVASCULAR: No edema  RESPIRATORY: Normal respirations    FULL ORGAN SYSTEM SKIN EXAM (SKIN)  Hair, Scalp, Ears, Face Normal except as noted below in Assessment   Neck, Cervical Chain Nodes Normal except as noted below in Assessment   Right Arm/Hand/Fingers Normal except as noted below in Assessment   Left Arm/Hand/Fingers Normal except as noted below in Assessment   Chest/Breasts/Axillae Viewed areas Normal except as noted below in Assessment   Abdomen, Umbilicus Normal except as noted below in Assessment   Back/Spine Normal except as noted below in Assessment   Groin/Genitalia/Buttocks Viewed areas Normal except as noted below in Assessment   Right Leg, Foot, Toes Normal except as noted below in Assessment   Left Leg, Foot, Toes Normal except as noted below in Assessment       1  BULLOUS PEMPHIGOID    Physical Exam:   Anatomic Location Affected:  clear   Morphological Description:  No current blisters    Pertinent Positives:   Pertinent Negatives:mouth clear   Additional History of Present Condition:  Patient currently taking Prednisone 10 mg BID  Patient to continue taking two tablets (1000 mg) BID    Assessment and Plan:  Based on a thorough discussion of this condition and the management approach to it (including a comprehensive discussion of the known risks, side effects and potential benefits of treatment), the patient (family) agrees to implement the following specific plan:   Prescribed Prednisone 15 mg daily   Pt to continue Cellcept two tablets (1000mg) in the morning and two tablets (1000mg) tablets in the evening   Blood work ordered, CMP and CBC   Clear clinical response  Will continue cellcept and slowly taper prednisone  What is bullous pemphigoid? Bullous pemphigoid is an autoimmune disease, meaning that your own body's immune system attacks components of the skin causing it to form blisters  It normally affects people over [de-identified]years old, but can also appear in young adults  There is no gender preference  What causes bullous pemphigoid? Many times there is no identifiable cause for bullous pemphigoid   Some medications such as anti-PD1 inhibitors used in chemotherapy have been implicated in the development of bullous pemphigoid  Genetic predisposition for autoimmune diseases is also a risk factor  For example, patients with psoriasis, especially those treated with phototherapy, are susceptible  There is also an association with neurological disease  Sometimes, injuries or infections may also trigger bullous pemphigoid  What are the symptoms of bullous pemphigoid? Bullous pemphigoid results in severe itchiness and large fluid-filled blisters that eventually rupture, forming crusted sores  Other symptoms include:   Nonspecific rash for several weeks before blisters appear   Red patches of skin resembling nummular dermatitis   Ring-shaped lesions on the skin    Smaller blisters (vesicles)   Clear or cloudy, yellowish or bloodstained blister fluid   Postinflammatory pigmentation   Milia in healed areas  The symptoms can be localized or widespread over the trunk and mearby limbs  Oral and genital involvement is less common  How do we diagnose bullous pemphigoid? The presence of typical signs and symptoms of bullous pemphigoid raise suspicion for the disease  Your dermatologist will likely confirm the diagnosis via skin biopsy of an early blister  When subject to direct immunofluorescence studies, antibodies against skin membranes are highlighted  Blood tests can also be done to check for circulating pemphigoid antibodies  How do we treat bullous pemphigoid? Since there is a wide spectrum of severity, many treatment options exist  First, if bullous pemphigoid is a medication reaction, then it is necessary to stop the offending medication   Other options are:   - Potent topical steroids for limited disease   - Moderate potency topical steroids to relieve dryness and itchiness  - Systemic steroids in severe and widespread blistering   - Antibiotics are used if there is a secondary bacterial infection   Most patients with bullous pemphigoid are treated with steroid tablets, usually prednisone  The dose is adjusted until the blisters have stopped appearing, which usually takes several weeks  The dose of prednisone is then slowly reduced over many months or years  As oral steroids have many undesirable side effects, other medications are added to ensure the lowest possible dose (aiming for 5 to 10 mg prednisone daily)  These other medications may include:   Tetracycline antibiotics, such as doxycycline   Nicotinamide   Dapsone   Methotrexate   Azathioprine   Mycophenolate   Intravenous immunoglobulin   Rituximab  If bullous pemphigoid is severe enough, hospitalization and admission to a burn unit may be warranted       Scribe Attestation    I,:   Dashawn Navarro RN am acting as a scribe while in the presence of the attending physician :        I,:   Alayna Comer MD personally performed the services described in this documentation    as scribed in my presence :

## 2020-03-04 NOTE — PATIENT INSTRUCTIONS
BULLOUS PEMPHIGOID    Assessment and Plan:  Based on a thorough discussion of this condition and the management approach to it (including a comprehensive discussion of the known risks, side effects and potential benefits of treatment), the patient (family) agrees to implement the following specific plan:   Prescribed Prednisone 15 mg daily   Pt to continue Cellcept two tablets (1000mg) in the morning and two tablets (1000mg) tablets in the evening   Blood work ordered, CMP and CBC      What is bullous pemphigoid? Bullous pemphigoid is an autoimmune disease, meaning that your own body's immune system attacks components of the skin causing it to form blisters  It normally affects people over [de-identified]years old, but can also appear in young adults  There is no gender preference  What causes bullous pemphigoid? Many times there is no identifiable cause for bullous pemphigoid  Some medications such as anti-PD1 inhibitors used in chemotherapy have been implicated in the development of bullous pemphigoid  Genetic predisposition for autoimmune diseases is also a risk factor  For example, patients with psoriasis, especially those treated with phototherapy, are susceptible  There is also an association with neurological disease  Sometimes, injuries or infections may also trigger bullous pemphigoid  What are the symptoms of bullous pemphigoid? Bullous pemphigoid results in severe itchiness and large fluid-filled blisters that eventually rupture, forming crusted sores  Other symptoms include:   Nonspecific rash for several weeks before blisters appear   Red patches of skin resembling nummular dermatitis   Ring-shaped lesions on the skin    Smaller blisters (vesicles)   Clear or cloudy, yellowish or bloodstained blister fluid   Postinflammatory pigmentation   Milia in healed areas  The symptoms can be localized or widespread over the trunk and mearby limbs  Oral and genital involvement is less common     How do we diagnose bullous pemphigoid? The presence of typical signs and symptoms of bullous pemphigoid raise suspicion for the disease  Your dermatologist will likely confirm the diagnosis via skin biopsy of an early blister  When subject to direct immunofluorescence studies, antibodies against skin membranes are highlighted  Blood tests can also be done to check for circulating pemphigoid antibodies  How do we treat bullous pemphigoid? Since there is a wide spectrum of severity, many treatment options exist  First, if bullous pemphigoid is a medication reaction, then it is necessary to stop the offending medication  Other options are:   - Potent topical steroids for limited disease   - Moderate potency topical steroids to relieve dryness and itchiness  - Systemic steroids in severe and widespread blistering   - Antibiotics are used if there is a secondary bacterial infection   Most patients with bullous pemphigoid are treated with steroid tablets, usually prednisone  The dose is adjusted until the blisters have stopped appearing, which usually takes several weeks  The dose of prednisone is then slowly reduced over many months or years  As oral steroids have many undesirable side effects, other medications are added to ensure the lowest possible dose (aiming for 5 to 10 mg prednisone daily)  These other medications may include:   Tetracycline antibiotics, such as doxycycline   Nicotinamide   Dapsone   Methotrexate   Azathioprine   Mycophenolate   Intravenous immunoglobulin   Rituximab  If bullous pemphigoid is severe enough, hospitalization and admission to a burn unit may be warranted

## 2020-04-06 ENCOUNTER — TELEPHONE (OUTPATIENT)
Dept: DERMATOLOGY | Facility: CLINIC | Age: 85
End: 2020-04-06

## 2020-04-07 ENCOUNTER — TELEMEDICINE (OUTPATIENT)
Dept: DERMATOLOGY | Facility: CLINIC | Age: 85
End: 2020-04-07
Payer: COMMERCIAL

## 2020-04-07 DIAGNOSIS — L12.0 BULLOUS PEMPHIGOID: ICD-10-CM

## 2020-04-07 PROCEDURE — 99214 OFFICE O/P EST MOD 30 MIN: CPT | Performed by: DERMATOLOGY

## 2020-04-07 RX ORDER — PREDNISONE 1 MG/1
TABLET ORAL
Qty: 90 TABLET | Refills: 2 | Status: SHIPPED | OUTPATIENT
Start: 2020-04-07 | End: 2020-04-07 | Stop reason: CLARIF

## 2020-04-07 RX ORDER — PREDNISONE 10 MG/1
TABLET ORAL
Qty: 90 TABLET | Refills: 1 | Status: SHIPPED | OUTPATIENT
Start: 2020-04-07 | End: 2020-10-23 | Stop reason: RX

## 2020-04-07 RX ORDER — PREDNISONE 10 MG/1
TABLET ORAL
Qty: 90 TABLET | Refills: 1 | Status: SHIPPED | OUTPATIENT
Start: 2020-04-07 | End: 2020-04-07 | Stop reason: CLARIF

## 2020-04-07 RX ORDER — PREDNISONE 1 MG/1
TABLET ORAL
Qty: 90 TABLET | Refills: 2 | Status: SHIPPED | OUTPATIENT
Start: 2020-04-07 | End: 2020-05-07

## 2020-06-02 ENCOUNTER — APPOINTMENT (EMERGENCY)
Dept: RADIOLOGY | Facility: HOSPITAL | Age: 85
DRG: 308 | End: 2020-06-02
Payer: COMMERCIAL

## 2020-06-02 ENCOUNTER — HOSPITAL ENCOUNTER (INPATIENT)
Facility: HOSPITAL | Age: 85
LOS: 4 days | Discharge: HOME HEALTH | DRG: 308 | End: 2020-06-06
Attending: EMERGENCY MEDICINE | Admitting: INTERNAL MEDICINE
Payer: COMMERCIAL

## 2020-06-02 DIAGNOSIS — I42.2 APICAL VARIANT HYPERTROPHIC CARDIOMYOPATHY (HCC): ICD-10-CM

## 2020-06-02 DIAGNOSIS — R09.89 SUSPECTED PULMONARY EMBOLISM: ICD-10-CM

## 2020-06-02 DIAGNOSIS — I48.91 NEW ONSET A-FIB (HCC): ICD-10-CM

## 2020-06-02 DIAGNOSIS — I26.99 PULMONARY EMBOLISM (HCC): Primary | ICD-10-CM

## 2020-06-02 DIAGNOSIS — Z98.890 S/P CARDIAC CATHETERIZATION: ICD-10-CM

## 2020-06-02 DIAGNOSIS — I26.94 MULTIPLE SUBSEGMENTAL PULMONARY EMBOLI WITHOUT ACUTE COR PULMONALE (HCC): ICD-10-CM

## 2020-06-02 DIAGNOSIS — E87.70 VOLUME OVERLOAD: ICD-10-CM

## 2020-06-02 DIAGNOSIS — I48.91 ATRIAL FIBRILLATION WITH RVR (HCC): ICD-10-CM

## 2020-06-02 PROBLEM — I65.29 STENOSIS OF CAROTID ARTERY: Status: ACTIVE | Noted: 2017-11-30

## 2020-06-02 PROBLEM — R06.02 SHORTNESS OF BREATH: Status: ACTIVE | Noted: 2020-06-02

## 2020-06-02 LAB
ALBUMIN SERPL BCP-MCNC: 3.3 G/DL (ref 3.5–5)
ALP SERPL-CCNC: 52 U/L (ref 46–116)
ALT SERPL W P-5'-P-CCNC: 160 U/L (ref 12–78)
ANION GAP SERPL CALCULATED.3IONS-SCNC: 12 MMOL/L (ref 4–13)
ANISOCYTOSIS BLD QL SMEAR: PRESENT
APTT PPP: 22 SECONDS (ref 23–37)
AST SERPL W P-5'-P-CCNC: 45 U/L (ref 5–45)
ATRIAL RATE: 500 BPM
BASOPHILS # BLD MANUAL: 0 THOUSAND/UL (ref 0–0.1)
BASOPHILS NFR MAR MANUAL: 0 % (ref 0–1)
BILIRUB SERPL-MCNC: 0.69 MG/DL (ref 0.2–1)
BUN SERPL-MCNC: 30 MG/DL (ref 5–25)
CALCIUM SERPL-MCNC: 9.1 MG/DL (ref 8.3–10.1)
CHLORIDE SERPL-SCNC: 98 MMOL/L (ref 100–108)
CO2 SERPL-SCNC: 20 MMOL/L (ref 21–32)
CREAT SERPL-MCNC: 1.04 MG/DL (ref 0.6–1.3)
D DIMER PPP FEU-MCNC: 0.57 UG/ML FEU
EOSINOPHIL # BLD MANUAL: 0 THOUSAND/UL (ref 0–0.4)
EOSINOPHIL NFR BLD MANUAL: 0 % (ref 0–6)
ERYTHROCYTE [DISTWIDTH] IN BLOOD BY AUTOMATED COUNT: 14.7 % (ref 11.6–15.1)
GFR SERPL CREATININE-BSD FRML MDRD: 50 ML/MIN/1.73SQ M
GLUCOSE SERPL-MCNC: 284 MG/DL (ref 65–140)
HCT VFR BLD AUTO: 40.3 % (ref 34.8–46.1)
HGB BLD-MCNC: 13.3 G/DL (ref 11.5–15.4)
INR PPP: 1.09 (ref 0.84–1.19)
LYMPHOCYTES # BLD AUTO: 0.75 THOUSAND/UL (ref 0.6–4.47)
LYMPHOCYTES # BLD AUTO: 6 % (ref 14–44)
MCH RBC QN AUTO: 32.4 PG (ref 26.8–34.3)
MCHC RBC AUTO-ENTMCNC: 33 G/DL (ref 31.4–37.4)
MCV RBC AUTO: 98 FL (ref 82–98)
MONOCYTES # BLD AUTO: 0.38 THOUSAND/UL (ref 0–1.22)
MONOCYTES NFR BLD: 3 % (ref 4–12)
NEUTROPHILS # BLD MANUAL: 11.3 THOUSAND/UL (ref 1.85–7.62)
NEUTS BAND NFR BLD MANUAL: 3 % (ref 0–8)
NEUTS SEG NFR BLD AUTO: 87 % (ref 43–75)
NRBC BLD AUTO-RTO: 0 /100 WBCS
NT-PROBNP SERPL-MCNC: 3333 PG/ML
PLATELET # BLD AUTO: 268 THOUSANDS/UL (ref 149–390)
PLATELET BLD QL SMEAR: ADEQUATE
PMV BLD AUTO: 10.5 FL (ref 8.9–12.7)
POIKILOCYTOSIS BLD QL SMEAR: PRESENT
POLYCHROMASIA BLD QL SMEAR: PRESENT
POTASSIUM SERPL-SCNC: 4.5 MMOL/L (ref 3.5–5.3)
PROT SERPL-MCNC: 6 G/DL (ref 6.4–8.2)
PROTHROMBIN TIME: 13.7 SECONDS (ref 11.6–14.5)
QRS AXIS: 7 DEGREES
QRSD INTERVAL: 88 MS
QT INTERVAL: 274 MS
QTC INTERVAL: 405 MS
RBC # BLD AUTO: 4.1 MILLION/UL (ref 3.81–5.12)
SARS-COV-2 RNA RESP QL NAA+PROBE: NEGATIVE
SCHISTOCYTES BLD QL SMEAR: PRESENT
SODIUM SERPL-SCNC: 130 MMOL/L (ref 136–145)
T WAVE AXIS: 180 DEGREES
TOTAL CELLS COUNTED SPEC: 100
TROPONIN I SERPL-MCNC: 0.04 NG/ML
VARIANT LYMPHS # BLD AUTO: 1 %
VENTRICULAR RATE: 132 BPM
WBC # BLD AUTO: 12.56 THOUSAND/UL (ref 4.31–10.16)

## 2020-06-02 PROCEDURE — 99285 EMERGENCY DEPT VISIT HI MDM: CPT

## 2020-06-02 PROCEDURE — 96376 TX/PRO/DX INJ SAME DRUG ADON: CPT

## 2020-06-02 PROCEDURE — 96374 THER/PROPH/DIAG INJ IV PUSH: CPT

## 2020-06-02 PROCEDURE — 99291 CRITICAL CARE FIRST HOUR: CPT | Performed by: EMERGENCY MEDICINE

## 2020-06-02 PROCEDURE — 93005 ELECTROCARDIOGRAM TRACING: CPT

## 2020-06-02 PROCEDURE — 93010 ELECTROCARDIOGRAM REPORT: CPT | Performed by: INTERNAL MEDICINE

## 2020-06-02 PROCEDURE — 84484 ASSAY OF TROPONIN QUANT: CPT | Performed by: EMERGENCY MEDICINE

## 2020-06-02 PROCEDURE — 85027 COMPLETE CBC AUTOMATED: CPT | Performed by: EMERGENCY MEDICINE

## 2020-06-02 PROCEDURE — 71045 X-RAY EXAM CHEST 1 VIEW: CPT

## 2020-06-02 PROCEDURE — 36415 COLL VENOUS BLD VENIPUNCTURE: CPT | Performed by: EMERGENCY MEDICINE

## 2020-06-02 PROCEDURE — 71275 CT ANGIOGRAPHY CHEST: CPT

## 2020-06-02 PROCEDURE — 85007 BL SMEAR W/DIFF WBC COUNT: CPT | Performed by: EMERGENCY MEDICINE

## 2020-06-02 PROCEDURE — 99223 1ST HOSP IP/OBS HIGH 75: CPT | Performed by: INTERNAL MEDICINE

## 2020-06-02 PROCEDURE — 85610 PROTHROMBIN TIME: CPT | Performed by: EMERGENCY MEDICINE

## 2020-06-02 PROCEDURE — U0003 INFECTIOUS AGENT DETECTION BY NUCLEIC ACID (DNA OR RNA); SEVERE ACUTE RESPIRATORY SYNDROME CORONAVIRUS 2 (SARS-COV-2) (CORONAVIRUS DISEASE [COVID-19]), AMPLIFIED PROBE TECHNIQUE, MAKING USE OF HIGH THROUGHPUT TECHNOLOGIES AS DESCRIBED BY CMS-2020-01-R: HCPCS | Performed by: EMERGENCY MEDICINE

## 2020-06-02 PROCEDURE — 96375 TX/PRO/DX INJ NEW DRUG ADDON: CPT

## 2020-06-02 PROCEDURE — 92960 CARDIOVERSION ELECTRIC EXT: CPT | Performed by: EMERGENCY MEDICINE

## 2020-06-02 PROCEDURE — 83880 ASSAY OF NATRIURETIC PEPTIDE: CPT | Performed by: EMERGENCY MEDICINE

## 2020-06-02 PROCEDURE — 85730 THROMBOPLASTIN TIME PARTIAL: CPT | Performed by: EMERGENCY MEDICINE

## 2020-06-02 PROCEDURE — 85379 FIBRIN DEGRADATION QUANT: CPT | Performed by: EMERGENCY MEDICINE

## 2020-06-02 PROCEDURE — 80053 COMPREHEN METABOLIC PANEL: CPT | Performed by: EMERGENCY MEDICINE

## 2020-06-02 PROCEDURE — 5A2204Z RESTORATION OF CARDIAC RHYTHM, SINGLE: ICD-10-PCS | Performed by: EMERGENCY MEDICINE

## 2020-06-02 RX ORDER — HEPARIN SODIUM 1000 [USP'U]/ML
2400 INJECTION, SOLUTION INTRAVENOUS; SUBCUTANEOUS
Status: DISCONTINUED | OUTPATIENT
Start: 2020-06-02 | End: 2020-06-05

## 2020-06-02 RX ORDER — DILTIAZEM HYDROCHLORIDE 5 MG/ML
15 INJECTION INTRAVENOUS ONCE
Status: COMPLETED | OUTPATIENT
Start: 2020-06-02 | End: 2020-06-02

## 2020-06-02 RX ORDER — HEPARIN SODIUM 1000 [USP'U]/ML
4800 INJECTION, SOLUTION INTRAVENOUS; SUBCUTANEOUS ONCE
Status: COMPLETED | OUTPATIENT
Start: 2020-06-02 | End: 2020-06-02

## 2020-06-02 RX ORDER — PRAVASTATIN SODIUM 20 MG
20 TABLET ORAL
Status: DISCONTINUED | OUTPATIENT
Start: 2020-06-03 | End: 2020-06-06 | Stop reason: HOSPADM

## 2020-06-02 RX ORDER — HEPARIN SODIUM 10000 [USP'U]/100ML
3-30 INJECTION, SOLUTION INTRAVENOUS
Status: DISCONTINUED | OUTPATIENT
Start: 2020-06-02 | End: 2020-06-05

## 2020-06-02 RX ORDER — HEPARIN SODIUM 1000 [USP'U]/ML
4800 INJECTION, SOLUTION INTRAVENOUS; SUBCUTANEOUS
Status: DISCONTINUED | OUTPATIENT
Start: 2020-06-02 | End: 2020-06-05

## 2020-06-02 RX ADMIN — HEPARIN SODIUM 4800 UNITS: 1000 INJECTION INTRAVENOUS; SUBCUTANEOUS at 18:34

## 2020-06-02 RX ADMIN — IOHEXOL 100 ML: 350 INJECTION, SOLUTION INTRAVENOUS at 16:51

## 2020-06-02 RX ADMIN — HEPARIN SODIUM AND DEXTROSE 18 UNITS/KG/HR: 10000; 5 INJECTION INTRAVENOUS at 18:34

## 2020-06-02 RX ADMIN — DILTIAZEM HYDROCHLORIDE 15 MG: 5 INJECTION INTRAVENOUS at 18:16

## 2020-06-02 RX ADMIN — DILTIAZEM HYDROCHLORIDE 2 MG/HR: 5 INJECTION INTRAVENOUS at 18:33

## 2020-06-03 ENCOUNTER — APPOINTMENT (INPATIENT)
Dept: NON INVASIVE DIAGNOSTICS | Facility: HOSPITAL | Age: 85
DRG: 308 | End: 2020-06-03
Payer: COMMERCIAL

## 2020-06-03 PROBLEM — E11.9 TYPE 2 DIABETES MELLITUS WITHOUT COMPLICATION, WITHOUT LONG-TERM CURRENT USE OF INSULIN (HCC): Status: ACTIVE | Noted: 2020-06-03

## 2020-06-03 PROBLEM — I50.9 ACUTE CONGESTIVE HEART FAILURE (HCC): Status: ACTIVE | Noted: 2020-06-03

## 2020-06-03 PROBLEM — E87.1 HYPONATREMIA: Status: ACTIVE | Noted: 2020-06-03

## 2020-06-03 PROBLEM — L12.0 BULLOUS PEMPHIGOID: Status: ACTIVE | Noted: 2020-06-03

## 2020-06-03 PROBLEM — I48.91 NEW ONSET A-FIB (HCC): Status: ACTIVE | Noted: 2020-06-03

## 2020-06-03 PROBLEM — I26.94 MULTIPLE SUBSEGMENTAL PULMONARY EMBOLI WITHOUT ACUTE COR PULMONALE (HCC): Status: ACTIVE | Noted: 2020-06-02

## 2020-06-03 LAB
APTT PPP: 58 SECONDS (ref 23–37)
APTT PPP: >210 SECONDS (ref 23–37)
APTT PPP: >210 SECONDS (ref 23–37)
EST. AVERAGE GLUCOSE BLD GHB EST-MCNC: 171 MG/DL
GLUCOSE SERPL-MCNC: 151 MG/DL (ref 65–140)
GLUCOSE SERPL-MCNC: 176 MG/DL (ref 65–140)
GLUCOSE SERPL-MCNC: 238 MG/DL (ref 65–140)
GLUCOSE SERPL-MCNC: 268 MG/DL (ref 65–140)
HBA1C MFR BLD: 7.6 %

## 2020-06-03 PROCEDURE — 93306 TTE W/DOPPLER COMPLETE: CPT | Performed by: INTERNAL MEDICINE

## 2020-06-03 PROCEDURE — 82948 REAGENT STRIP/BLOOD GLUCOSE: CPT

## 2020-06-03 PROCEDURE — 85730 THROMBOPLASTIN TIME PARTIAL: CPT | Performed by: PHYSICIAN ASSISTANT

## 2020-06-03 PROCEDURE — 85730 THROMBOPLASTIN TIME PARTIAL: CPT

## 2020-06-03 PROCEDURE — 85730 THROMBOPLASTIN TIME PARTIAL: CPT | Performed by: EMERGENCY MEDICINE

## 2020-06-03 PROCEDURE — 83036 HEMOGLOBIN GLYCOSYLATED A1C: CPT | Performed by: INTERNAL MEDICINE

## 2020-06-03 PROCEDURE — 85730 THROMBOPLASTIN TIME PARTIAL: CPT | Performed by: INTERNAL MEDICINE

## 2020-06-03 PROCEDURE — 36415 COLL VENOUS BLD VENIPUNCTURE: CPT | Performed by: EMERGENCY MEDICINE

## 2020-06-03 PROCEDURE — 99222 1ST HOSP IP/OBS MODERATE 55: CPT | Performed by: INTERNAL MEDICINE

## 2020-06-03 PROCEDURE — 99232 SBSQ HOSP IP/OBS MODERATE 35: CPT | Performed by: PHYSICIAN ASSISTANT

## 2020-06-03 PROCEDURE — C8929 TTE W OR WO FOL WCON,DOPPLER: HCPCS

## 2020-06-03 RX ORDER — INSULIN GLARGINE 100 [IU]/ML
9 INJECTION, SOLUTION SUBCUTANEOUS
Status: DISCONTINUED | OUTPATIENT
Start: 2020-06-03 | End: 2020-06-04

## 2020-06-03 RX ORDER — FUROSEMIDE 10 MG/ML
20 INJECTION INTRAMUSCULAR; INTRAVENOUS
Status: DISCONTINUED | OUTPATIENT
Start: 2020-06-03 | End: 2020-06-05

## 2020-06-03 RX ORDER — AMLODIPINE BESYLATE 5 MG/1
5 TABLET ORAL DAILY
Status: DISCONTINUED | OUTPATIENT
Start: 2020-06-03 | End: 2020-06-03

## 2020-06-03 RX ORDER — PREDNISONE 1 MG/1
5 TABLET ORAL
Status: DISCONTINUED | OUTPATIENT
Start: 2020-06-03 | End: 2020-06-06 | Stop reason: HOSPADM

## 2020-06-03 RX ORDER — LISINOPRIL 2.5 MG/1
2.5 TABLET ORAL DAILY
Status: DISCONTINUED | OUTPATIENT
Start: 2020-06-03 | End: 2020-06-04

## 2020-06-03 RX ORDER — LATANOPROST 50 UG/ML
1 SOLUTION/ DROPS OPHTHALMIC
Status: DISCONTINUED | OUTPATIENT
Start: 2020-06-03 | End: 2020-06-06 | Stop reason: HOSPADM

## 2020-06-03 RX ORDER — ACETAMINOPHEN 325 MG/1
650 TABLET ORAL EVERY 6 HOURS PRN
Status: DISCONTINUED | OUTPATIENT
Start: 2020-06-03 | End: 2020-06-06 | Stop reason: HOSPADM

## 2020-06-03 RX ORDER — MYCOPHENOLATE MOFETIL 250 MG/1
1000 CAPSULE ORAL EVERY 12 HOURS SCHEDULED
Status: DISCONTINUED | OUTPATIENT
Start: 2020-06-03 | End: 2020-06-06 | Stop reason: HOSPADM

## 2020-06-03 RX ORDER — ASPIRIN 81 MG/1
81 TABLET, CHEWABLE ORAL DAILY
Status: DISCONTINUED | OUTPATIENT
Start: 2020-06-03 | End: 2020-06-06 | Stop reason: HOSPADM

## 2020-06-03 RX ORDER — METOPROLOL TARTRATE 5 MG/5ML
5 INJECTION INTRAVENOUS ONCE
Status: COMPLETED | OUTPATIENT
Start: 2020-06-03 | End: 2020-06-03

## 2020-06-03 RX ADMIN — LISINOPRIL 2.5 MG: 2.5 TABLET ORAL at 08:07

## 2020-06-03 RX ADMIN — HEPARIN SODIUM 2400 UNITS: 1000 INJECTION INTRAVENOUS; SUBCUTANEOUS at 17:21

## 2020-06-03 RX ADMIN — TRIAMCINOLONE ACETONIDE: 1 OINTMENT TOPICAL at 09:05

## 2020-06-03 RX ADMIN — METOPROLOL TARTRATE 25 MG: 25 TABLET, FILM COATED ORAL at 17:20

## 2020-06-03 RX ADMIN — FUROSEMIDE 20 MG: 10 INJECTION, SOLUTION INTRAMUSCULAR; INTRAVENOUS at 17:20

## 2020-06-03 RX ADMIN — INSULIN GLARGINE 9 UNITS: 100 INJECTION, SOLUTION SUBCUTANEOUS at 22:01

## 2020-06-03 RX ADMIN — ASPIRIN 81 MG 81 MG: 81 TABLET ORAL at 08:03

## 2020-06-03 RX ADMIN — PREDNISONE 5 MG: 5 TABLET ORAL at 17:20

## 2020-06-03 RX ADMIN — PRAVASTATIN SODIUM 20 MG: 20 TABLET ORAL at 17:20

## 2020-06-03 RX ADMIN — INSULIN LISPRO 2 UNITS: 100 INJECTION, SOLUTION INTRAVENOUS; SUBCUTANEOUS at 17:25

## 2020-06-03 RX ADMIN — INSULIN LISPRO 2 UNITS: 100 INJECTION, SOLUTION INTRAVENOUS; SUBCUTANEOUS at 22:01

## 2020-06-03 RX ADMIN — INSULIN LISPRO 1 UNITS: 100 INJECTION, SOLUTION INTRAVENOUS; SUBCUTANEOUS at 07:35

## 2020-06-03 RX ADMIN — INSULIN LISPRO 3 UNITS: 100 INJECTION, SOLUTION INTRAVENOUS; SUBCUTANEOUS at 07:35

## 2020-06-03 RX ADMIN — METOPROLOL TARTRATE 25 MG: 25 TABLET, FILM COATED ORAL at 12:19

## 2020-06-03 RX ADMIN — AMLODIPINE BESYLATE 5 MG: 5 TABLET ORAL at 08:06

## 2020-06-03 RX ADMIN — FUROSEMIDE 20 MG: 10 INJECTION, SOLUTION INTRAMUSCULAR; INTRAVENOUS at 07:34

## 2020-06-03 RX ADMIN — MYCOPHENOLATE MOFETIL 1000 MG: 250 CAPSULE ORAL at 22:01

## 2020-06-03 RX ADMIN — METOPROLOL TARTRATE 25 MG: 25 TABLET, FILM COATED ORAL at 23:39

## 2020-06-03 RX ADMIN — MYCOPHENOLATE MOFETIL 1000 MG: 250 CAPSULE ORAL at 09:06

## 2020-06-03 RX ADMIN — HEPARIN SODIUM AND DEXTROSE 14 UNITS/KG/HR: 10000; 5 INJECTION INTRAVENOUS at 22:10

## 2020-06-03 RX ADMIN — PERFLUTREN 1 ML/MIN: 6.52 INJECTION, SUSPENSION INTRAVENOUS at 10:12

## 2020-06-03 RX ADMIN — INSULIN LISPRO 3 UNITS: 100 INJECTION, SOLUTION INTRAVENOUS; SUBCUTANEOUS at 17:25

## 2020-06-03 RX ADMIN — PREDNISONE 15 MG: 5 TABLET ORAL at 08:04

## 2020-06-03 RX ADMIN — DILTIAZEM HYDROCHLORIDE 2 MG/HR: 5 INJECTION INTRAVENOUS at 08:45

## 2020-06-03 RX ADMIN — METOPROLOL TARTRATE 5 MG: 5 INJECTION INTRAVENOUS at 08:00

## 2020-06-03 RX ADMIN — LATANOPROST 1 DROP: 50 SOLUTION OPHTHALMIC at 22:01

## 2020-06-04 PROBLEM — E87.70 VOLUME OVERLOAD: Status: ACTIVE | Noted: 2020-06-03

## 2020-06-04 PROBLEM — E87.6 HYPOKALEMIA: Status: ACTIVE | Noted: 2020-06-04

## 2020-06-04 LAB
ANION GAP SERPL CALCULATED.3IONS-SCNC: 11 MMOL/L (ref 4–13)
APTT PPP: 145 SECONDS (ref 23–37)
APTT PPP: 53 SECONDS (ref 23–37)
APTT PPP: 65 SECONDS (ref 23–37)
APTT PPP: 84 SECONDS (ref 23–37)
BASOPHILS # BLD AUTO: 0.01 THOUSANDS/ΜL (ref 0–0.1)
BASOPHILS NFR BLD AUTO: 0 % (ref 0–1)
BUN SERPL-MCNC: 27 MG/DL (ref 5–25)
CALCIUM SERPL-MCNC: 8.6 MG/DL (ref 8.3–10.1)
CHLORIDE SERPL-SCNC: 97 MMOL/L (ref 100–108)
CO2 SERPL-SCNC: 24 MMOL/L (ref 21–32)
CREAT SERPL-MCNC: 0.8 MG/DL (ref 0.6–1.3)
EOSINOPHIL # BLD AUTO: 0.01 THOUSAND/ΜL (ref 0–0.61)
EOSINOPHIL NFR BLD AUTO: 0 % (ref 0–6)
ERYTHROCYTE [DISTWIDTH] IN BLOOD BY AUTOMATED COUNT: 14.4 % (ref 11.6–15.1)
GFR SERPL CREATININE-BSD FRML MDRD: 68 ML/MIN/1.73SQ M
GLUCOSE SERPL-MCNC: 191 MG/DL (ref 65–140)
GLUCOSE SERPL-MCNC: 197 MG/DL (ref 65–140)
GLUCOSE SERPL-MCNC: 202 MG/DL (ref 65–140)
GLUCOSE SERPL-MCNC: 255 MG/DL (ref 65–140)
GLUCOSE SERPL-MCNC: 257 MG/DL (ref 65–140)
HCT VFR BLD AUTO: 36.3 % (ref 34.8–46.1)
HGB BLD-MCNC: 12.2 G/DL (ref 11.5–15.4)
IMM GRANULOCYTES # BLD AUTO: 0.16 THOUSAND/UL (ref 0–0.2)
IMM GRANULOCYTES NFR BLD AUTO: 2 % (ref 0–2)
LYMPHOCYTES # BLD AUTO: 1.09 THOUSANDS/ΜL (ref 0.6–4.47)
LYMPHOCYTES NFR BLD AUTO: 13 % (ref 14–44)
MCH RBC QN AUTO: 32.1 PG (ref 26.8–34.3)
MCHC RBC AUTO-ENTMCNC: 33.6 G/DL (ref 31.4–37.4)
MCV RBC AUTO: 96 FL (ref 82–98)
MONOCYTES # BLD AUTO: 0.58 THOUSAND/ΜL (ref 0.17–1.22)
MONOCYTES NFR BLD AUTO: 7 % (ref 4–12)
NEUTROPHILS # BLD AUTO: 6.82 THOUSANDS/ΜL (ref 1.85–7.62)
NEUTS SEG NFR BLD AUTO: 78 % (ref 43–75)
NRBC BLD AUTO-RTO: 0 /100 WBCS
PLATELET # BLD AUTO: 206 THOUSANDS/UL (ref 149–390)
PMV BLD AUTO: 11.3 FL (ref 8.9–12.7)
POTASSIUM SERPL-SCNC: 3.3 MMOL/L (ref 3.5–5.3)
RBC # BLD AUTO: 3.8 MILLION/UL (ref 3.81–5.12)
SODIUM SERPL-SCNC: 132 MMOL/L (ref 136–145)
WBC # BLD AUTO: 8.67 THOUSAND/UL (ref 4.31–10.16)

## 2020-06-04 PROCEDURE — 85730 THROMBOPLASTIN TIME PARTIAL: CPT | Performed by: PHYSICIAN ASSISTANT

## 2020-06-04 PROCEDURE — 85730 THROMBOPLASTIN TIME PARTIAL: CPT | Performed by: INTERNAL MEDICINE

## 2020-06-04 PROCEDURE — 85025 COMPLETE CBC W/AUTO DIFF WBC: CPT | Performed by: PHYSICIAN ASSISTANT

## 2020-06-04 PROCEDURE — 80048 BASIC METABOLIC PNL TOTAL CA: CPT | Performed by: PHYSICIAN ASSISTANT

## 2020-06-04 PROCEDURE — 97167 OT EVAL HIGH COMPLEX 60 MIN: CPT

## 2020-06-04 PROCEDURE — 99232 SBSQ HOSP IP/OBS MODERATE 35: CPT | Performed by: INTERNAL MEDICINE

## 2020-06-04 PROCEDURE — 99232 SBSQ HOSP IP/OBS MODERATE 35: CPT | Performed by: PHYSICIAN ASSISTANT

## 2020-06-04 PROCEDURE — 97163 PT EVAL HIGH COMPLEX 45 MIN: CPT

## 2020-06-04 PROCEDURE — 82948 REAGENT STRIP/BLOOD GLUCOSE: CPT

## 2020-06-04 RX ORDER — DILTIAZEM HYDROCHLORIDE 5 MG/ML
15 INJECTION INTRAVENOUS ONCE
Status: COMPLETED | OUTPATIENT
Start: 2020-06-04 | End: 2020-06-04

## 2020-06-04 RX ORDER — INSULIN GLARGINE 100 [IU]/ML
12 INJECTION, SOLUTION SUBCUTANEOUS
Status: DISCONTINUED | OUTPATIENT
Start: 2020-06-04 | End: 2020-06-06 | Stop reason: HOSPADM

## 2020-06-04 RX ORDER — POTASSIUM CHLORIDE 20 MEQ/1
40 TABLET, EXTENDED RELEASE ORAL ONCE
Status: COMPLETED | OUTPATIENT
Start: 2020-06-04 | End: 2020-06-04

## 2020-06-04 RX ADMIN — METOPROLOL TARTRATE 25 MG: 25 TABLET, FILM COATED ORAL at 11:20

## 2020-06-04 RX ADMIN — POTASSIUM CHLORIDE 40 MEQ: 1500 TABLET, EXTENDED RELEASE ORAL at 09:01

## 2020-06-04 RX ADMIN — LATANOPROST 1 DROP: 50 SOLUTION OPHTHALMIC at 21:27

## 2020-06-04 RX ADMIN — INSULIN LISPRO 2 UNITS: 100 INJECTION, SOLUTION INTRAVENOUS; SUBCUTANEOUS at 17:50

## 2020-06-04 RX ADMIN — METOPROLOL TARTRATE 25 MG: 25 TABLET, FILM COATED ORAL at 17:46

## 2020-06-04 RX ADMIN — INSULIN LISPRO 3 UNITS: 100 INJECTION, SOLUTION INTRAVENOUS; SUBCUTANEOUS at 08:52

## 2020-06-04 RX ADMIN — ASPIRIN 81 MG 81 MG: 81 TABLET ORAL at 08:51

## 2020-06-04 RX ADMIN — MYCOPHENOLATE MOFETIL 1000 MG: 250 CAPSULE ORAL at 21:17

## 2020-06-04 RX ADMIN — DILTIAZEM HYDROCHLORIDE 15 MG: 5 INJECTION INTRAVENOUS at 08:48

## 2020-06-04 RX ADMIN — HEPARIN SODIUM 2400 UNITS: 1000 INJECTION INTRAVENOUS; SUBCUTANEOUS at 16:10

## 2020-06-04 RX ADMIN — FUROSEMIDE 20 MG: 10 INJECTION, SOLUTION INTRAMUSCULAR; INTRAVENOUS at 08:49

## 2020-06-04 RX ADMIN — INSULIN GLARGINE 12 UNITS: 100 INJECTION, SOLUTION SUBCUTANEOUS at 21:25

## 2020-06-04 RX ADMIN — INSULIN LISPRO 2 UNITS: 100 INJECTION, SOLUTION INTRAVENOUS; SUBCUTANEOUS at 21:18

## 2020-06-04 RX ADMIN — MYCOPHENOLATE MOFETIL 1000 MG: 250 CAPSULE ORAL at 08:55

## 2020-06-04 RX ADMIN — PRAVASTATIN SODIUM 20 MG: 20 TABLET ORAL at 16:13

## 2020-06-04 RX ADMIN — PREDNISONE 5 MG: 5 TABLET ORAL at 17:46

## 2020-06-04 RX ADMIN — METOPROLOL TARTRATE 25 MG: 25 TABLET, FILM COATED ORAL at 05:33

## 2020-06-04 RX ADMIN — INSULIN LISPRO 1 UNITS: 100 INJECTION, SOLUTION INTRAVENOUS; SUBCUTANEOUS at 08:52

## 2020-06-04 RX ADMIN — PREDNISONE 15 MG: 5 TABLET ORAL at 08:51

## 2020-06-04 RX ADMIN — FUROSEMIDE 20 MG: 10 INJECTION, SOLUTION INTRAMUSCULAR; INTRAVENOUS at 16:15

## 2020-06-04 RX ADMIN — INSULIN LISPRO 3 UNITS: 100 INJECTION, SOLUTION INTRAVENOUS; SUBCUTANEOUS at 11:27

## 2020-06-04 RX ADMIN — INSULIN LISPRO 1 UNITS: 100 INJECTION, SOLUTION INTRAVENOUS; SUBCUTANEOUS at 11:22

## 2020-06-04 RX ADMIN — DILTIAZEM HYDROCHLORIDE 30 MG: 30 TABLET, FILM COATED ORAL at 17:45

## 2020-06-04 RX ADMIN — INSULIN LISPRO 3 UNITS: 100 INJECTION, SOLUTION INTRAVENOUS; SUBCUTANEOUS at 17:53

## 2020-06-04 RX ADMIN — DILTIAZEM HYDROCHLORIDE 30 MG: 30 TABLET, FILM COATED ORAL at 11:21

## 2020-06-05 ENCOUNTER — APPOINTMENT (INPATIENT)
Dept: NON INVASIVE DIAGNOSTICS | Facility: HOSPITAL | Age: 85
DRG: 308 | End: 2020-06-05
Payer: COMMERCIAL

## 2020-06-05 PROBLEM — E87.6 HYPOKALEMIA: Status: RESOLVED | Noted: 2020-06-04 | Resolved: 2020-06-05

## 2020-06-05 LAB
ANION GAP SERPL CALCULATED.3IONS-SCNC: 11 MMOL/L (ref 4–13)
APTT PPP: 87 SECONDS (ref 23–37)
BUN SERPL-MCNC: 32 MG/DL (ref 5–25)
CALCIUM SERPL-MCNC: 8.7 MG/DL (ref 8.3–10.1)
CHLORIDE SERPL-SCNC: 97 MMOL/L (ref 100–108)
CO2 SERPL-SCNC: 24 MMOL/L (ref 21–32)
CREAT SERPL-MCNC: 0.86 MG/DL (ref 0.6–1.3)
ERYTHROCYTE [DISTWIDTH] IN BLOOD BY AUTOMATED COUNT: 14.3 % (ref 11.6–15.1)
GFR SERPL CREATININE-BSD FRML MDRD: 63 ML/MIN/1.73SQ M
GLUCOSE SERPL-MCNC: 173 MG/DL (ref 65–140)
GLUCOSE SERPL-MCNC: 188 MG/DL (ref 65–140)
GLUCOSE SERPL-MCNC: 230 MG/DL (ref 65–140)
GLUCOSE SERPL-MCNC: 241 MG/DL (ref 65–140)
HCT VFR BLD AUTO: 35.3 % (ref 34.8–46.1)
HGB BLD-MCNC: 12.1 G/DL (ref 11.5–15.4)
MAGNESIUM SERPL-MCNC: 2 MG/DL (ref 1.6–2.6)
MCH RBC QN AUTO: 32.8 PG (ref 26.8–34.3)
MCHC RBC AUTO-ENTMCNC: 34.3 G/DL (ref 31.4–37.4)
MCV RBC AUTO: 96 FL (ref 82–98)
PLATELET # BLD AUTO: 211 THOUSANDS/UL (ref 149–390)
PMV BLD AUTO: 11.7 FL (ref 8.9–12.7)
POTASSIUM SERPL-SCNC: 3.6 MMOL/L (ref 3.5–5.3)
RBC # BLD AUTO: 3.69 MILLION/UL (ref 3.81–5.12)
SODIUM 24H UR-SCNC: 92 MOL/L
SODIUM SERPL-SCNC: 132 MMOL/L (ref 136–145)
WBC # BLD AUTO: 9.63 THOUSAND/UL (ref 4.31–10.16)

## 2020-06-05 PROCEDURE — 83735 ASSAY OF MAGNESIUM: CPT | Performed by: PHYSICIAN ASSISTANT

## 2020-06-05 PROCEDURE — 93970 EXTREMITY STUDY: CPT

## 2020-06-05 PROCEDURE — 85730 THROMBOPLASTIN TIME PARTIAL: CPT | Performed by: INTERNAL MEDICINE

## 2020-06-05 PROCEDURE — 99232 SBSQ HOSP IP/OBS MODERATE 35: CPT | Performed by: NURSE PRACTITIONER

## 2020-06-05 PROCEDURE — 82948 REAGENT STRIP/BLOOD GLUCOSE: CPT

## 2020-06-05 PROCEDURE — 85027 COMPLETE CBC AUTOMATED: CPT | Performed by: PHYSICIAN ASSISTANT

## 2020-06-05 PROCEDURE — 84300 ASSAY OF URINE SODIUM: CPT | Performed by: NURSE PRACTITIONER

## 2020-06-05 PROCEDURE — 99232 SBSQ HOSP IP/OBS MODERATE 35: CPT | Performed by: INTERNAL MEDICINE

## 2020-06-05 PROCEDURE — 80048 BASIC METABOLIC PNL TOTAL CA: CPT | Performed by: PHYSICIAN ASSISTANT

## 2020-06-05 RX ORDER — FUROSEMIDE 20 MG/1
20 TABLET ORAL DAILY
Status: DISCONTINUED | OUTPATIENT
Start: 2020-06-06 | End: 2020-06-06 | Stop reason: HOSPADM

## 2020-06-05 RX ORDER — METOPROLOL TARTRATE 50 MG/1
50 TABLET, FILM COATED ORAL EVERY 6 HOURS
Status: DISCONTINUED | OUTPATIENT
Start: 2020-06-05 | End: 2020-06-06 | Stop reason: HOSPADM

## 2020-06-05 RX ADMIN — INSULIN GLARGINE 12 UNITS: 100 INJECTION, SOLUTION SUBCUTANEOUS at 21:52

## 2020-06-05 RX ADMIN — INSULIN LISPRO 2 UNITS: 100 INJECTION, SOLUTION INTRAVENOUS; SUBCUTANEOUS at 11:49

## 2020-06-05 RX ADMIN — DILTIAZEM HYDROCHLORIDE 30 MG: 30 TABLET, FILM COATED ORAL at 21:53

## 2020-06-05 RX ADMIN — MYCOPHENOLATE MOFETIL 1000 MG: 250 CAPSULE ORAL at 21:52

## 2020-06-05 RX ADMIN — DILTIAZEM HYDROCHLORIDE 30 MG: 30 TABLET, FILM COATED ORAL at 01:28

## 2020-06-05 RX ADMIN — PREDNISONE 15 MG: 5 TABLET ORAL at 08:38

## 2020-06-05 RX ADMIN — DILTIAZEM HYDROCHLORIDE 30 MG: 30 TABLET, FILM COATED ORAL at 09:03

## 2020-06-05 RX ADMIN — INSULIN LISPRO 1 UNITS: 100 INJECTION, SOLUTION INTRAVENOUS; SUBCUTANEOUS at 08:36

## 2020-06-05 RX ADMIN — APIXABAN 10 MG: 5 TABLET, FILM COATED ORAL at 17:33

## 2020-06-05 RX ADMIN — INSULIN LISPRO 2 UNITS: 100 INJECTION, SOLUTION INTRAVENOUS; SUBCUTANEOUS at 17:26

## 2020-06-05 RX ADMIN — HEPARIN SODIUM AND DEXTROSE 13 UNITS/KG/HR: 10000; 5 INJECTION INTRAVENOUS at 08:43

## 2020-06-05 RX ADMIN — METOPROLOL TARTRATE 25 MG: 25 TABLET, FILM COATED ORAL at 01:29

## 2020-06-05 RX ADMIN — METOPROLOL TARTRATE 50 MG: 50 TABLET, FILM COATED ORAL at 17:33

## 2020-06-05 RX ADMIN — ASPIRIN 81 MG 81 MG: 81 TABLET ORAL at 08:38

## 2020-06-05 RX ADMIN — FUROSEMIDE 20 MG: 10 INJECTION, SOLUTION INTRAMUSCULAR; INTRAVENOUS at 08:38

## 2020-06-05 RX ADMIN — PREDNISONE 5 MG: 5 TABLET ORAL at 18:44

## 2020-06-05 RX ADMIN — METOPROLOL TARTRATE 25 MG: 25 TABLET, FILM COATED ORAL at 05:47

## 2020-06-05 RX ADMIN — DILTIAZEM HYDROCHLORIDE 30 MG: 30 TABLET, FILM COATED ORAL at 15:47

## 2020-06-05 RX ADMIN — INSULIN LISPRO 3 UNITS: 100 INJECTION, SOLUTION INTRAVENOUS; SUBCUTANEOUS at 08:36

## 2020-06-05 RX ADMIN — METOPROLOL TARTRATE 25 MG: 25 TABLET, FILM COATED ORAL at 11:50

## 2020-06-05 RX ADMIN — INSULIN LISPRO 3 UNITS: 100 INJECTION, SOLUTION INTRAVENOUS; SUBCUTANEOUS at 11:50

## 2020-06-05 RX ADMIN — INSULIN LISPRO 2 UNITS: 100 INJECTION, SOLUTION INTRAVENOUS; SUBCUTANEOUS at 21:52

## 2020-06-05 RX ADMIN — PRAVASTATIN SODIUM 20 MG: 20 TABLET ORAL at 15:47

## 2020-06-05 RX ADMIN — INSULIN LISPRO 3 UNITS: 100 INJECTION, SOLUTION INTRAVENOUS; SUBCUTANEOUS at 17:29

## 2020-06-05 RX ADMIN — LATANOPROST 1 DROP: 50 SOLUTION OPHTHALMIC at 21:57

## 2020-06-05 RX ADMIN — MYCOPHENOLATE MOFETIL 1000 MG: 250 CAPSULE ORAL at 08:39

## 2020-06-06 ENCOUNTER — TELEPHONE (OUTPATIENT)
Dept: DERMATOLOGY | Facility: CLINIC | Age: 85
End: 2020-06-06

## 2020-06-06 VITALS
DIASTOLIC BLOOD PRESSURE: 74 MMHG | RESPIRATION RATE: 17 BRPM | OXYGEN SATURATION: 96 % | TEMPERATURE: 98.2 F | HEART RATE: 65 BPM | SYSTOLIC BLOOD PRESSURE: 114 MMHG | HEIGHT: 64 IN | BODY MASS INDEX: 23.3 KG/M2 | WEIGHT: 136.47 LBS

## 2020-06-06 DIAGNOSIS — L12.0 BULLOUS PEMPHIGOID: ICD-10-CM

## 2020-06-06 LAB
ANION GAP SERPL CALCULATED.3IONS-SCNC: 8 MMOL/L (ref 4–13)
BUN SERPL-MCNC: 30 MG/DL (ref 5–25)
CALCIUM SERPL-MCNC: 9.2 MG/DL (ref 8.3–10.1)
CHLORIDE SERPL-SCNC: 97 MMOL/L (ref 100–108)
CO2 SERPL-SCNC: 26 MMOL/L (ref 21–32)
CREAT SERPL-MCNC: 0.82 MG/DL (ref 0.6–1.3)
GFR SERPL CREATININE-BSD FRML MDRD: 66 ML/MIN/1.73SQ M
GLUCOSE SERPL-MCNC: 165 MG/DL (ref 65–140)
GLUCOSE SERPL-MCNC: 166 MG/DL (ref 65–140)
GLUCOSE SERPL-MCNC: 233 MG/DL (ref 65–140)
GLUCOSE SERPL-MCNC: 268 MG/DL (ref 65–140)
OSMOLALITY UR/SERPL-RTO: 281 MMOL/KG (ref 282–298)
POTASSIUM SERPL-SCNC: 3.7 MMOL/L (ref 3.5–5.3)
SODIUM SERPL-SCNC: 131 MMOL/L (ref 136–145)
TSH SERPL DL<=0.05 MIU/L-ACNC: 1.16 UIU/ML (ref 0.36–3.74)

## 2020-06-06 PROCEDURE — 83930 ASSAY OF BLOOD OSMOLALITY: CPT | Performed by: NURSE PRACTITIONER

## 2020-06-06 PROCEDURE — 99239 HOSP IP/OBS DSCHRG MGMT >30: CPT | Performed by: NURSE PRACTITIONER

## 2020-06-06 PROCEDURE — 97530 THERAPEUTIC ACTIVITIES: CPT

## 2020-06-06 PROCEDURE — 82948 REAGENT STRIP/BLOOD GLUCOSE: CPT

## 2020-06-06 PROCEDURE — 93970 EXTREMITY STUDY: CPT | Performed by: SURGERY

## 2020-06-06 PROCEDURE — 80048 BASIC METABOLIC PNL TOTAL CA: CPT | Performed by: NURSE PRACTITIONER

## 2020-06-06 PROCEDURE — 99232 SBSQ HOSP IP/OBS MODERATE 35: CPT | Performed by: INTERNAL MEDICINE

## 2020-06-06 PROCEDURE — 84443 ASSAY THYROID STIM HORMONE: CPT | Performed by: NURSE PRACTITIONER

## 2020-06-06 RX ORDER — METOPROLOL TARTRATE 100 MG/1
100 TABLET ORAL EVERY 12 HOURS SCHEDULED
Qty: 60 TABLET | Refills: 0 | Status: SHIPPED | OUTPATIENT
Start: 2020-06-06

## 2020-06-06 RX ORDER — DILTIAZEM HYDROCHLORIDE 120 MG/1
120 CAPSULE, COATED, EXTENDED RELEASE ORAL DAILY
Qty: 30 CAPSULE | Refills: 0 | Status: SHIPPED | OUTPATIENT
Start: 2020-06-06

## 2020-06-06 RX ORDER — FUROSEMIDE 20 MG/1
20 TABLET ORAL DAILY
Qty: 30 TABLET | Refills: 0 | Status: SHIPPED | OUTPATIENT
Start: 2020-06-07 | End: 2020-06-27 | Stop reason: HOSPADM

## 2020-06-06 RX ADMIN — APIXABAN 10 MG: 5 TABLET, FILM COATED ORAL at 08:14

## 2020-06-06 RX ADMIN — APIXABAN 10 MG: 5 TABLET, FILM COATED ORAL at 16:51

## 2020-06-06 RX ADMIN — METOPROLOL TARTRATE 50 MG: 50 TABLET, FILM COATED ORAL at 16:52

## 2020-06-06 RX ADMIN — INSULIN LISPRO 3 UNITS: 100 INJECTION, SOLUTION INTRAVENOUS; SUBCUTANEOUS at 08:13

## 2020-06-06 RX ADMIN — INSULIN LISPRO 2 UNITS: 100 INJECTION, SOLUTION INTRAVENOUS; SUBCUTANEOUS at 16:30

## 2020-06-06 RX ADMIN — DILTIAZEM HYDROCHLORIDE 30 MG: 30 TABLET, FILM COATED ORAL at 16:51

## 2020-06-06 RX ADMIN — INSULIN LISPRO 3 UNITS: 100 INJECTION, SOLUTION INTRAVENOUS; SUBCUTANEOUS at 11:37

## 2020-06-06 RX ADMIN — INSULIN LISPRO 3 UNITS: 100 INJECTION, SOLUTION INTRAVENOUS; SUBCUTANEOUS at 16:30

## 2020-06-06 RX ADMIN — METOPROLOL TARTRATE 50 MG: 50 TABLET, FILM COATED ORAL at 06:26

## 2020-06-06 RX ADMIN — MYCOPHENOLATE MOFETIL 1000 MG: 250 CAPSULE ORAL at 08:18

## 2020-06-06 RX ADMIN — PREDNISONE 5 MG: 5 TABLET ORAL at 16:52

## 2020-06-06 RX ADMIN — ASPIRIN 81 MG 81 MG: 81 TABLET ORAL at 08:14

## 2020-06-06 RX ADMIN — PRAVASTATIN SODIUM 20 MG: 20 TABLET ORAL at 16:52

## 2020-06-06 RX ADMIN — DILTIAZEM HYDROCHLORIDE 30 MG: 30 TABLET, FILM COATED ORAL at 08:16

## 2020-06-06 RX ADMIN — PREDNISONE 15 MG: 5 TABLET ORAL at 08:14

## 2020-06-06 RX ADMIN — INSULIN LISPRO 1 UNITS: 100 INJECTION, SOLUTION INTRAVENOUS; SUBCUTANEOUS at 08:00

## 2020-06-06 RX ADMIN — FUROSEMIDE 20 MG: 20 TABLET ORAL at 08:14

## 2020-06-06 RX ADMIN — METOPROLOL TARTRATE 50 MG: 50 TABLET, FILM COATED ORAL at 00:20

## 2020-06-06 RX ADMIN — DILTIAZEM HYDROCHLORIDE 30 MG: 30 TABLET, FILM COATED ORAL at 03:29

## 2020-06-06 RX ADMIN — INSULIN LISPRO 2 UNITS: 100 INJECTION, SOLUTION INTRAVENOUS; SUBCUTANEOUS at 11:37

## 2020-06-06 RX ADMIN — METOPROLOL TARTRATE 50 MG: 50 TABLET, FILM COATED ORAL at 11:40

## 2020-06-08 RX ORDER — MYCOPHENOLATE MOFETIL 500 MG/1
TABLET ORAL
Qty: 160 TABLET | Refills: 3 | Status: SHIPPED | OUTPATIENT
Start: 2020-06-08

## 2020-06-25 ENCOUNTER — APPOINTMENT (EMERGENCY)
Dept: RADIOLOGY | Facility: HOSPITAL | Age: 85
DRG: 640 | End: 2020-06-25
Payer: COMMERCIAL

## 2020-06-25 ENCOUNTER — HOSPITAL ENCOUNTER (INPATIENT)
Facility: HOSPITAL | Age: 85
LOS: 1 days | Discharge: HOME | DRG: 640 | End: 2020-06-27
Attending: SURGERY | Admitting: INTERNAL MEDICINE
Payer: COMMERCIAL

## 2020-06-25 DIAGNOSIS — R55 SYNCOPE AND COLLAPSE: Primary | ICD-10-CM

## 2020-06-25 LAB
ALBUMIN SERPL BCP-MCNC: 3.4 G/DL (ref 3.5–5)
ALP SERPL-CCNC: 65 U/L (ref 46–116)
ALT SERPL W P-5'-P-CCNC: 81 U/L (ref 12–78)
ANION GAP SERPL CALCULATED.3IONS-SCNC: 13 MMOL/L (ref 4–13)
APTT PPP: 23 SECONDS (ref 23–37)
AST SERPL W P-5'-P-CCNC: 82 U/L (ref 5–45)
ATRIAL RATE: 441 BPM
BASE EXCESS BLDA CALC-SCNC: -1 MMOL/L (ref -2–3)
BASOPHILS # BLD AUTO: 0.04 THOUSANDS/ΜL (ref 0–0.1)
BASOPHILS NFR BLD AUTO: 0 % (ref 0–1)
BILIRUB SERPL-MCNC: 0.69 MG/DL (ref 0.2–1)
BUN SERPL-MCNC: 22 MG/DL (ref 5–25)
CA-I BLD-SCNC: 1.18 MMOL/L (ref 1.12–1.32)
CALCIUM SERPL-MCNC: 9.2 MG/DL (ref 8.3–10.1)
CHLORIDE SERPL-SCNC: 97 MMOL/L (ref 100–108)
CO2 SERPL-SCNC: 21 MMOL/L (ref 21–32)
CREAT SERPL-MCNC: 1.25 MG/DL (ref 0.6–1.3)
EOSINOPHIL # BLD AUTO: 0.01 THOUSAND/ΜL (ref 0–0.61)
EOSINOPHIL NFR BLD AUTO: 0 % (ref 0–6)
ERYTHROCYTE [DISTWIDTH] IN BLOOD BY AUTOMATED COUNT: 14.6 % (ref 11.6–15.1)
GFR SERPL CREATININE-BSD FRML MDRD: 40 ML/MIN/1.73SQ M
GLUCOSE SERPL-MCNC: 226 MG/DL (ref 65–140)
GLUCOSE SERPL-MCNC: 275 MG/DL (ref 65–140)
GLUCOSE SERPL-MCNC: 279 MG/DL (ref 65–140)
HCO3 BLDA-SCNC: 23.4 MMOL/L (ref 24–30)
HCT VFR BLD AUTO: 42 % (ref 34.8–46.1)
HCT VFR BLD CALC: 41 % (ref 34.8–46.1)
HGB BLD-MCNC: 13.7 G/DL (ref 11.5–15.4)
HGB BLDA-MCNC: 13.9 G/DL (ref 11.5–15.4)
IMM GRANULOCYTES # BLD AUTO: 0.26 THOUSAND/UL (ref 0–0.2)
IMM GRANULOCYTES NFR BLD AUTO: 2 % (ref 0–2)
INR PPP: 1.12 (ref 0.84–1.19)
LYMPHOCYTES # BLD AUTO: 1.21 THOUSANDS/ΜL (ref 0.6–4.47)
LYMPHOCYTES NFR BLD AUTO: 11 % (ref 14–44)
MCH RBC QN AUTO: 32.1 PG (ref 26.8–34.3)
MCHC RBC AUTO-ENTMCNC: 32.6 G/DL (ref 31.4–37.4)
MCV RBC AUTO: 98 FL (ref 82–98)
MONOCYTES # BLD AUTO: 0.47 THOUSAND/ΜL (ref 0.17–1.22)
MONOCYTES NFR BLD AUTO: 4 % (ref 4–12)
NEUTROPHILS # BLD AUTO: 8.78 THOUSANDS/ΜL (ref 1.85–7.62)
NEUTS SEG NFR BLD AUTO: 83 % (ref 43–75)
NRBC BLD AUTO-RTO: 0 /100 WBCS
P AXIS: 72 DEGREES
PCO2 BLD: 24 MMOL/L (ref 21–32)
PCO2 BLD: 35.4 MM HG (ref 42–50)
PH BLD: 7.43 [PH] (ref 7.3–7.4)
PLATELET # BLD AUTO: 263 THOUSANDS/UL (ref 149–390)
PMV BLD AUTO: 10.1 FL (ref 8.9–12.7)
PO2 BLD: 39 MM HG (ref 35–45)
POTASSIUM BLD-SCNC: 4.1 MMOL/L (ref 3.5–5.3)
POTASSIUM SERPL-SCNC: 3.9 MMOL/L (ref 3.5–5.3)
PROT SERPL-MCNC: 6 G/DL (ref 6.4–8.2)
PROTHROMBIN TIME: 14.4 SECONDS (ref 11.6–14.5)
QRS AXIS: 35 DEGREES
QRSD INTERVAL: 98 MS
QT INTERVAL: 316 MS
QTC INTERVAL: 407 MS
RBC # BLD AUTO: 4.27 MILLION/UL (ref 3.81–5.12)
SAO2 % BLD FROM PO2: 75 % (ref 60–85)
SODIUM BLD-SCNC: 130 MMOL/L (ref 136–145)
SODIUM SERPL-SCNC: 131 MMOL/L (ref 136–145)
SPECIMEN SOURCE: ABNORMAL
T WAVE AXIS: 203 DEGREES
TROPONIN I SERPL-MCNC: 0.05 NG/ML
TROPONIN I SERPL-MCNC: 0.06 NG/ML
VENTRICULAR RATE: 100 BPM
WBC # BLD AUTO: 10.77 THOUSAND/UL (ref 4.31–10.16)

## 2020-06-25 PROCEDURE — 84484 ASSAY OF TROPONIN QUANT: CPT | Performed by: INTERNAL MEDICINE

## 2020-06-25 PROCEDURE — 82330 ASSAY OF CALCIUM: CPT

## 2020-06-25 PROCEDURE — 72125 CT NECK SPINE W/O DYE: CPT

## 2020-06-25 PROCEDURE — 85730 THROMBOPLASTIN TIME PARTIAL: CPT | Performed by: SURGERY

## 2020-06-25 PROCEDURE — 99285 EMERGENCY DEPT VISIT HI MDM: CPT

## 2020-06-25 PROCEDURE — 84295 ASSAY OF SERUM SODIUM: CPT

## 2020-06-25 PROCEDURE — 84132 ASSAY OF SERUM POTASSIUM: CPT

## 2020-06-25 PROCEDURE — 36415 COLL VENOUS BLD VENIPUNCTURE: CPT | Performed by: SURGERY

## 2020-06-25 PROCEDURE — 85025 COMPLETE CBC W/AUTO DIFF WBC: CPT | Performed by: SURGERY

## 2020-06-25 PROCEDURE — 71045 X-RAY EXAM CHEST 1 VIEW: CPT

## 2020-06-25 PROCEDURE — 85014 HEMATOCRIT: CPT

## 2020-06-25 PROCEDURE — 70450 CT HEAD/BRAIN W/O DYE: CPT

## 2020-06-25 PROCEDURE — 93005 ELECTROCARDIOGRAM TRACING: CPT

## 2020-06-25 PROCEDURE — 80053 COMPREHEN METABOLIC PANEL: CPT | Performed by: SURGERY

## 2020-06-25 PROCEDURE — NC001 PR NO CHARGE: Performed by: EMERGENCY MEDICINE

## 2020-06-25 PROCEDURE — 99284 EMERGENCY DEPT VISIT MOD MDM: CPT | Performed by: SURGERY

## 2020-06-25 PROCEDURE — 82947 ASSAY GLUCOSE BLOOD QUANT: CPT

## 2020-06-25 PROCEDURE — 85610 PROTHROMBIN TIME: CPT | Performed by: SURGERY

## 2020-06-25 PROCEDURE — 83036 HEMOGLOBIN GLYCOSYLATED A1C: CPT | Performed by: INTERNAL MEDICINE

## 2020-06-25 PROCEDURE — 99220 PR INITIAL OBSERVATION CARE/DAY 70 MINUTES: CPT | Performed by: INTERNAL MEDICINE

## 2020-06-25 PROCEDURE — 84484 ASSAY OF TROPONIN QUANT: CPT | Performed by: SURGERY

## 2020-06-25 PROCEDURE — 93010 ELECTROCARDIOGRAM REPORT: CPT | Performed by: INTERNAL MEDICINE

## 2020-06-25 PROCEDURE — 82948 REAGENT STRIP/BLOOD GLUCOSE: CPT

## 2020-06-25 PROCEDURE — 82803 BLOOD GASES ANY COMBINATION: CPT

## 2020-06-25 RX ORDER — MYCOPHENOLATE MOFETIL 250 MG/1
1000 CAPSULE ORAL EVERY 12 HOURS SCHEDULED
Status: DISCONTINUED | OUTPATIENT
Start: 2020-06-25 | End: 2020-06-27 | Stop reason: HOSPADM

## 2020-06-25 RX ORDER — LATANOPROST 50 UG/ML
1 SOLUTION/ DROPS OPHTHALMIC
Status: DISCONTINUED | OUTPATIENT
Start: 2020-06-25 | End: 2020-06-27 | Stop reason: HOSPADM

## 2020-06-25 RX ORDER — DORZOLAMIDE HYDROCHLORIDE AND TIMOLOL MALEATE 20; 5 MG/ML; MG/ML
1 SOLUTION/ DROPS OPHTHALMIC EVERY 12 HOURS SCHEDULED
Status: DISCONTINUED | OUTPATIENT
Start: 2020-06-25 | End: 2020-06-27 | Stop reason: HOSPADM

## 2020-06-25 RX ORDER — ACETAMINOPHEN 325 MG/1
650 TABLET ORAL EVERY 6 HOURS PRN
Status: DISCONTINUED | OUTPATIENT
Start: 2020-06-25 | End: 2020-06-27 | Stop reason: HOSPADM

## 2020-06-25 RX ORDER — SODIUM CHLORIDE, SODIUM GLUCONATE, SODIUM ACETATE, POTASSIUM CHLORIDE, MAGNESIUM CHLORIDE, SODIUM PHOSPHATE, DIBASIC, AND POTASSIUM PHOSPHATE .53; .5; .37; .037; .03; .012; .00082 G/100ML; G/100ML; G/100ML; G/100ML; G/100ML; G/100ML; G/100ML
60 INJECTION, SOLUTION INTRAVENOUS CONTINUOUS
Status: DISCONTINUED | OUTPATIENT
Start: 2020-06-25 | End: 2020-06-26

## 2020-06-25 RX ORDER — DOCUSATE SODIUM 100 MG/1
100 CAPSULE, LIQUID FILLED ORAL 2 TIMES DAILY PRN
Status: DISCONTINUED | OUTPATIENT
Start: 2020-06-25 | End: 2020-06-27 | Stop reason: HOSPADM

## 2020-06-25 RX ORDER — PRAVASTATIN SODIUM 40 MG
40 TABLET ORAL
Status: DISCONTINUED | OUTPATIENT
Start: 2020-06-26 | End: 2020-06-27 | Stop reason: HOSPADM

## 2020-06-25 RX ORDER — MAGNESIUM HYDROXIDE/ALUMINUM HYDROXICE/SIMETHICONE 120; 1200; 1200 MG/30ML; MG/30ML; MG/30ML
30 SUSPENSION ORAL EVERY 6 HOURS PRN
Status: DISCONTINUED | OUTPATIENT
Start: 2020-06-25 | End: 2020-06-27 | Stop reason: HOSPADM

## 2020-06-25 RX ORDER — DILTIAZEM HYDROCHLORIDE 120 MG/1
120 CAPSULE, COATED, EXTENDED RELEASE ORAL DAILY
Status: DISCONTINUED | OUTPATIENT
Start: 2020-06-26 | End: 2020-06-27 | Stop reason: HOSPADM

## 2020-06-25 RX ORDER — ONDANSETRON 2 MG/ML
4 INJECTION INTRAMUSCULAR; INTRAVENOUS EVERY 6 HOURS PRN
Status: DISCONTINUED | OUTPATIENT
Start: 2020-06-25 | End: 2020-06-27 | Stop reason: HOSPADM

## 2020-06-25 RX ORDER — METOPROLOL TARTRATE 50 MG/1
100 TABLET, FILM COATED ORAL EVERY 12 HOURS SCHEDULED
Status: DISCONTINUED | OUTPATIENT
Start: 2020-06-25 | End: 2020-06-27 | Stop reason: HOSPADM

## 2020-06-25 RX ORDER — ASPIRIN 81 MG/1
81 TABLET, CHEWABLE ORAL DAILY
Status: DISCONTINUED | OUTPATIENT
Start: 2020-06-26 | End: 2020-06-27 | Stop reason: HOSPADM

## 2020-06-25 RX ADMIN — APIXABAN 5 MG: 5 TABLET, FILM COATED ORAL at 22:39

## 2020-06-25 RX ADMIN — LATANOPROST 1 DROP: 50 SOLUTION OPHTHALMIC at 23:14

## 2020-06-25 RX ADMIN — METOPROLOL TARTRATE 100 MG: 50 TABLET, FILM COATED ORAL at 22:38

## 2020-06-25 RX ADMIN — SODIUM CHLORIDE, SODIUM GLUCONATE, SODIUM ACETATE, POTASSIUM CHLORIDE, MAGNESIUM CHLORIDE, SODIUM PHOSPHATE, DIBASIC, AND POTASSIUM PHOSPHATE 60 ML/HR: .53; .5; .37; .037; .03; .012; .00082 INJECTION, SOLUTION INTRAVENOUS at 22:29

## 2020-06-25 NOTE — ED PROVIDER NOTES
Emergency Department Airway Evaluation and Management Form    History  Obtained from: patient  Losartan and Sulfa antibiotics  Chief Complaint   Patient presents with    Syncope     syncopal episode with headstrike  + LOC     HPI    Past Medical History:   Diagnosis Date    Abnormal heart rhythms     Cardiac disease     2 stents    Diabetes mellitus ([address] )     borderline    Hyperlipidemia     Hypertension     Migraines     Stroke [facility])      Past Surgical History:   Procedure Laterality Date    COLONOSCOPY      DILATION AND CURETTAGE, DIAGNOSTIC / THERAPEUTIC      SKIN BIOPSY      TONSILLECTOMY      WISDOM TOOTH EXTRACTION      X3      Family History   Problem Relation Age of Onset    Heart disease Father     Heart attack Sister     No Known Problems Sister     Lupus Sister     Cataracts Sister     Hypertension Sister     Stroke Daughter     Neuropathy Daughter     Allergies Daughter     Cancer Daughter      Social History     Tobacco Use    Smoking status: Former Smoker     Years: 35 00     Types: Cigarettes     Last attempt to quit:      Years since quittin 5    Smokeless tobacco: Never Used    Tobacco comment: social smoker    Substance Use Topics    Alcohol use: Yes     Comment: rare     Drug use: Never     I have reviewed and agree with the history as documented      Review of Systems    Physical Exam  /83   Pulse 98   Temp 98 6 °F (37 °C) (Tympanic)   Resp 18   SpO2 97%     Physical Exam    ED Medications  Medications - No data to display    Intubation  Procedures    Notes  Fall, head strike, eliquis, no airway intervention    Final Diagnosis  Final diagnoses:   None       ED Provider  Electronically Signed by     Rajiv Barton DO  20 [id number]

## 2020-06-25 NOTE — H&P
H&P Exam - Trauma   Sheree Burnette 81 y o  female MRN: [id number]  [facility] Encounter: [id number]    Assessment/Plan   Trauma Alert: Level B  Model of Arrival: Ambulance  Trauma Team: Attending Anisha Lin  Consultants:none    Trauma Active Problems: Fall on Eliquis     Trauma Plan: Admit to medicine, tertiary sign out    Chief Complaint: Fall, LOC    History of Present Illness   HPI:  Sheree Burnette is a 81 y o  female with hx of Afib, CAD, recent PE, DM, HTN, who presents s/p fall on Eliquis  Patient was sitting in chair when she had an unwitnessed fall with +LOC and woke up on the floor  No tongue biting or loss of bowel/bladder function  Was able to get up on her own and drove herself to ED  Denies hx of seizure  Denies headache, neck pain, back pain, extremity pain, chest pain, abdominal pain, any other injuries or complaints  Had previously been well  Denies fever, chills, cough, chest pain, SOB, n/v/d, abdominal pain, focal neurological deficits  Mechanism:Fall    Review of Systems   Constitutional: Negative  Negative for chills and fever  HENT: Negative  Negative for rhinorrhea  Eyes: Negative  Respiratory: Negative  Negative for cough and shortness of breath  Cardiovascular: Negative  Negative for chest pain and leg swelling  Gastrointestinal: Negative  Negative for abdominal pain, diarrhea, nausea and vomiting  Genitourinary: Negative  Negative for dysuria, flank pain and frequency  Musculoskeletal: Negative  Negative for back pain and neck pain  Skin: Negative  Negative for rash  Neurological: Negative  Negative for light-headedness and headaches  All other systems reviewed and are negative  12-point, complete review of systems was reviewed and negative except as stated above         Historical Information     Past Medical History:   Diagnosis Date    Abnormal heart rhythms     Cardiac disease     2 stents    Diabetes mellitus [address] [address] )     borderline    Hyperlipidemia     Hypertension     Migraines     Stroke [facility])      Past Surgical History:   Procedure Laterality Date    COLONOSCOPY      DILATION AND CURETTAGE, DIAGNOSTIC / THERAPEUTIC      SKIN BIOPSY      TONSILLECTOMY      WISDOM TOOTH EXTRACTION      X3      Social History   Social History     Substance and Sexual Activity   Alcohol Use Yes    Comment: rare      Social History     Substance and Sexual Activity   Drug Use Never     Social History     Tobacco Use   Smoking Status Former Smoker    Years: 35 00    Types: Cigarettes    Last attempt to quit: [place] Years since quittin 5   Smokeless Tobacco Never Used   Tobacco Comment    social smoker      E-Cigarette/Vaping    E-Cigarette Use Never User      E-Cigarette/Vaping Substances    Nicotine No     THC No     CBD No     Flavoring No     Other No     Unknown No        There is no immunization history on file for this patient  Last Tetanus: Unknown  Family History: Non-contributory    Meds/Allergies   all current active meds have been reviewed    Allergies   Allergen Reactions    Losartan     Sulfa Antibiotics          PHYSICAL EXAM      Objective   Vitals:   First set: Temperature: 98 6 °F (37 °C) (20 1735)  Pulse: 98 (20 1735)  Respirations: 18 (20 1735)  Blood Pressure: 122/83 (20 1735)    Primary Survey:   (A) Airway: Intact  (B) Breathing: Symmetric breath sounds bilaterally  (C) Circulation: Pulses:   pedal  2/4 and radial  2/4  (D) Disabliity:  GCS Total:  15  (E) Expose:  Completed    Secondary Survey: (Click on Physical Exam tab above)  Physical Exam   Constitutional: She is oriented to person, place, and time  She appears well-developed and well-nourished  HENT:   Head: Normocephalic and atraumatic  Right Ear: External ear normal    Left Ear: External ear normal    Nose: Nose normal    Mouth/Throat: Oropharynx is clear and moist    No craniofacial ecchymosis, crepitus, or deformity   No olivas's sign  No raccoon eyes  No hemotympanum  Eyes: Pupils are equal, round, and reactive to light  EOM are normal    Neck: No tracheal deviation present  Patient in [address]  No midine spinal cervical spinal tenderness  Cardiovascular: Normal rate, regular rhythm, normal heart sounds and intact distal pulses  Exam reveals no gallop and no friction rub  No murmur heard  Pulses:       Radial pulses are 2+ on the right side, and 2+ on the left side  Posterior tibial pulses are 2+ on the right side, and 2+ on the left side  Pulmonary/Chest: Effort normal and breath sounds normal  No respiratory distress  She has no wheezes  She has no rales  She exhibits no tenderness  No chest wall tenderness, ecchymosis, or crepitus  Abdominal: Soft  She exhibits no distension  There is no tenderness  There is no rebound and no guarding  Musculoskeletal: Normal range of motion  She exhibits no edema, tenderness or deformity  Pelvis stable  Full range of motion in all four extremities without pain or deformity  Neurological: She is alert and oriented to person, place, and time  No sensory deficit  GCS eye subscore is 4  GCS verbal subscore is 5  GCS motor subscore is 6  Clear fluent speech  Good distal sensation in all four extremities  Skin: Skin is warm and dry  Capillary refill takes less than 2 seconds  Nursing note and vitals reviewed        Invasive Devices     Peripheral Intravenous Line            Peripheral IV 06/25/20 Left Wrist less than 1 day                Lab Results:   Results: I have personally reviewed pertinent reports   , BMP/CMP:   Lab Results   Component Value Date    SODIUM 131 (L) 06/25/2020    K 3 9 06/25/2020    CL 97 (L) 06/25/2020    CO2 21 06/25/2020    CO2 24 06/25/2020    BUN 22 06/25/2020    CREATININE 1 25 06/25/2020    GLUCOSE 279 (H) 06/25/2020    CALCIUM 9 2 06/25/2020    AST 82 (H) 06/25/2020    ALT 81 (H) 06/25/2020    ALKPHOS 65 06/25/2020    EGFR 40 06/25/2020   , CBC:   Lab Results   Component Value Date    WBC 10 77 (H) 06/25/2020    HGB 13 7 06/25/2020    HCT 42 0 06/25/2020    MCV 98 06/25/2020     06/25/2020    MCH 32 1 06/25/2020    MCHC 32 6 06/25/2020    RDW 14 6 06/25/2020    MPV 10 1 06/25/2020    NRBC 0 06/25/2020   , Troponin:   Lab Results   Component Value Date    TROPONINI 0 05 (H) 06/26/2020    and ISTAT: No components found for: VBG  Imaging/EKG Studies:  FAST: Negative  6/25 CT C-spine: No cervical spine fracture or traumatic malalignment  6/25 CTH: 1   Small right paramedian posterior parietal scalp contusion /laceration without evidence for fracture or acute intracranial abnormality  6/25 CXR: No acute cardiopulmonary disease within limitations of supine imaging      The patient had a CT scan of the cervical spine demonstrating no acute fractures or traumatic malalignment  On exam, the patient had no sharp midline tenderness nor paresthesias in the upper extremities  The patient had full range of motion (was then able to flex, extend, and rotate head side to side) without pain  There were no distracting injuries and the patient was not intoxicated       Code Status: Level 1 - Full Code  Advance Directive and Living Will:      Power of :    POLST:

## 2020-06-26 LAB
ANION GAP SERPL CALCULATED.3IONS-SCNC: 9 MMOL/L (ref 4–13)
BASOPHILS # BLD AUTO: 0.02 THOUSANDS/ΜL (ref 0–0.1)
BASOPHILS NFR BLD AUTO: 0 % (ref 0–1)
BILIRUB UR QL STRIP: NEGATIVE
BUN SERPL-MCNC: 20 MG/DL (ref 5–25)
CALCIUM SERPL-MCNC: 8.4 MG/DL (ref 8.3–10.1)
CHLORIDE SERPL-SCNC: 101 MMOL/L (ref 100–108)
CLARITY UR: CLEAR
CO2 SERPL-SCNC: 28 MMOL/L (ref 21–32)
COLOR UR: YELLOW
CREAT SERPL-MCNC: 1.02 MG/DL (ref 0.6–1.3)
EOSINOPHIL # BLD AUTO: 0.03 THOUSAND/ΜL (ref 0–0.61)
EOSINOPHIL NFR BLD AUTO: 0 % (ref 0–6)
ERYTHROCYTE [DISTWIDTH] IN BLOOD BY AUTOMATED COUNT: 14.6 % (ref 11.6–15.1)
EST. AVERAGE GLUCOSE BLD GHB EST-MCNC: 180 MG/DL
GFR SERPL CREATININE-BSD FRML MDRD: 51 ML/MIN/1.73SQ M
GLUCOSE SERPL-MCNC: 117 MG/DL (ref 65–140)
GLUCOSE SERPL-MCNC: 141 MG/DL (ref 65–140)
GLUCOSE SERPL-MCNC: 202 MG/DL (ref 65–140)
GLUCOSE SERPL-MCNC: 255 MG/DL (ref 65–140)
GLUCOSE SERPL-MCNC: 273 MG/DL (ref 65–140)
GLUCOSE UR STRIP-MCNC: ABNORMAL MG/DL
HBA1C MFR BLD: 7.9 %
HCT VFR BLD AUTO: 36.8 % (ref 34.8–46.1)
HGB BLD-MCNC: 12.1 G/DL (ref 11.5–15.4)
HGB UR QL STRIP.AUTO: NEGATIVE
IMM GRANULOCYTES # BLD AUTO: 0.15 THOUSAND/UL (ref 0–0.2)
IMM GRANULOCYTES NFR BLD AUTO: 2 % (ref 0–2)
KETONES UR STRIP-MCNC: NEGATIVE MG/DL
LEUKOCYTE ESTERASE UR QL STRIP: NEGATIVE
LYMPHOCYTES # BLD AUTO: 1.51 THOUSANDS/ΜL (ref 0.6–4.47)
LYMPHOCYTES NFR BLD AUTO: 21 % (ref 14–44)
MAGNESIUM SERPL-MCNC: 2.1 MG/DL (ref 1.6–2.6)
MCH RBC QN AUTO: 31.9 PG (ref 26.8–34.3)
MCHC RBC AUTO-ENTMCNC: 32.9 G/DL (ref 31.4–37.4)
MCV RBC AUTO: 97 FL (ref 82–98)
MONOCYTES # BLD AUTO: 0.58 THOUSAND/ΜL (ref 0.17–1.22)
MONOCYTES NFR BLD AUTO: 8 % (ref 4–12)
NEUTROPHILS # BLD AUTO: 4.98 THOUSANDS/ΜL (ref 1.85–7.62)
NEUTS SEG NFR BLD AUTO: 69 % (ref 43–75)
NITRITE UR QL STRIP: NEGATIVE
NRBC BLD AUTO-RTO: 0 /100 WBCS
PH UR STRIP.AUTO: 6.5 [PH]
PHOSPHATE SERPL-MCNC: 2.6 MG/DL (ref 2.3–4.1)
PLATELET # BLD AUTO: 187 THOUSANDS/UL (ref 149–390)
PMV BLD AUTO: 10.2 FL (ref 8.9–12.7)
POTASSIUM SERPL-SCNC: 3.4 MMOL/L (ref 3.5–5.3)
PROT UR STRIP-MCNC: NEGATIVE MG/DL
RBC # BLD AUTO: 3.79 MILLION/UL (ref 3.81–5.12)
SODIUM SERPL-SCNC: 138 MMOL/L (ref 136–145)
SP GR UR STRIP.AUTO: 1.01 (ref 1–1.03)
TROPONIN I SERPL-MCNC: 0.05 NG/ML
TROPONIN I SERPL-MCNC: 0.05 NG/ML
TSH SERPL DL<=0.05 MIU/L-ACNC: 0.63 UIU/ML (ref 0.36–3.74)
UROBILINOGEN UR QL STRIP.AUTO: 0.2 E.U./DL
WBC # BLD AUTO: 7.27 THOUSAND/UL (ref 4.31–10.16)

## 2020-06-26 PROCEDURE — 81003 URINALYSIS AUTO W/O SCOPE: CPT | Performed by: INTERNAL MEDICINE

## 2020-06-26 PROCEDURE — 85025 COMPLETE CBC W/AUTO DIFF WBC: CPT | Performed by: INTERNAL MEDICINE

## 2020-06-26 PROCEDURE — 99223 1ST HOSP IP/OBS HIGH 75: CPT | Performed by: INTERNAL MEDICINE

## 2020-06-26 PROCEDURE — 83735 ASSAY OF MAGNESIUM: CPT | Performed by: INTERNAL MEDICINE

## 2020-06-26 PROCEDURE — 84100 ASSAY OF PHOSPHORUS: CPT | Performed by: INTERNAL MEDICINE

## 2020-06-26 PROCEDURE — 84484 ASSAY OF TROPONIN QUANT: CPT | Performed by: INTERNAL MEDICINE

## 2020-06-26 PROCEDURE — 99232 SBSQ HOSP IP/OBS MODERATE 35: CPT | Performed by: PHYSICIAN ASSISTANT

## 2020-06-26 PROCEDURE — 99233 SBSQ HOSP IP/OBS HIGH 50: CPT | Performed by: NURSE PRACTITIONER

## 2020-06-26 PROCEDURE — 82948 REAGENT STRIP/BLOOD GLUCOSE: CPT

## 2020-06-26 PROCEDURE — 84443 ASSAY THYROID STIM HORMONE: CPT | Performed by: INTERNAL MEDICINE

## 2020-06-26 PROCEDURE — 80048 BASIC METABOLIC PNL TOTAL CA: CPT | Performed by: INTERNAL MEDICINE

## 2020-06-26 RX ORDER — POTASSIUM CHLORIDE 20 MEQ/1
40 TABLET, EXTENDED RELEASE ORAL ONCE
Status: COMPLETED | OUTPATIENT
Start: 2020-06-26 | End: 2020-06-26

## 2020-06-26 RX ADMIN — PREDNISONE 15 MG: 5 TABLET ORAL at 08:08

## 2020-06-26 RX ADMIN — APIXABAN 5 MG: 5 TABLET, FILM COATED ORAL at 17:04

## 2020-06-26 RX ADMIN — METOPROLOL TARTRATE 100 MG: 50 TABLET, FILM COATED ORAL at 20:52

## 2020-06-26 RX ADMIN — DORZOLAMIDE HYDROCHLORIDE AND TIMOLOL MALEATE 1 DROP: 20; 5 SOLUTION/ DROPS OPHTHALMIC at 08:08

## 2020-06-26 RX ADMIN — APIXABAN 5 MG: 5 TABLET, FILM COATED ORAL at 08:07

## 2020-06-26 RX ADMIN — DILTIAZEM HYDROCHLORIDE 120 MG: 120 CAPSULE, EXTENDED RELEASE ORAL at 08:07

## 2020-06-26 RX ADMIN — MYCOPHENOLATE MOFETIL 1000 MG: 250 CAPSULE ORAL at 02:01

## 2020-06-26 RX ADMIN — METOPROLOL TARTRATE 100 MG: 50 TABLET, FILM COATED ORAL at 08:07

## 2020-06-26 RX ADMIN — ASPIRIN 81 MG 81 MG: 81 TABLET ORAL at 08:07

## 2020-06-26 RX ADMIN — POTASSIUM CHLORIDE 40 MEQ: 1500 TABLET, EXTENDED RELEASE ORAL at 11:01

## 2020-06-26 RX ADMIN — LATANOPROST 1 DROP: 50 SOLUTION OPHTHALMIC at 21:01

## 2020-06-26 RX ADMIN — DORZOLAMIDE HYDROCHLORIDE AND TIMOLOL MALEATE 1 DROP: 20; 5 SOLUTION/ DROPS OPHTHALMIC at 02:00

## 2020-06-26 RX ADMIN — MYCOPHENOLATE MOFETIL 1000 MG: 250 CAPSULE ORAL at 20:55

## 2020-06-26 RX ADMIN — PRAVASTATIN SODIUM 40 MG: 40 TABLET ORAL at 17:04

## 2020-06-26 RX ADMIN — DORZOLAMIDE HYDROCHLORIDE AND TIMOLOL MALEATE 1 DROP: 20; 5 SOLUTION/ DROPS OPHTHALMIC at 20:54

## 2020-06-26 RX ADMIN — INSULIN LISPRO 1 UNITS: 100 INJECTION, SOLUTION INTRAVENOUS; SUBCUTANEOUS at 17:05

## 2020-06-26 RX ADMIN — MYCOPHENOLATE MOFETIL 1000 MG: 250 CAPSULE ORAL at 08:08

## 2020-06-26 NOTE — ASSESSMENT & PLAN NOTE
Lab Results   Component Value Date    HGBA1C 7 6 (H) 06/03/2020   Inpatient purposes, will put on hold metformin  Check blood sugars before meals and before bedtime with appropriate insulin sliding scale  Hemoglobin A1c

## 2020-06-26 NOTE — PROGRESS NOTES
Progress Note - Ganesh Gonzalez 1935, 81 y o  female MRN: [id number]    Unit/Bed#: [facility] 802-01 Encounter: [id number]    Primary Care Provider: Devika Yañez DO   Date and time admitted to hospital: 6/25/2020  5:34 PM        * Syncope and collapse  Assessment & Plan  · Mildly elevated BUN creatinine from previous, on Lasix  Dehydration?      · Will put on hold Lasix   · Orthostatics  · Telemetry   · Trop slightly elevated  · Card consult     Apical variant hypertrophic cardiomyopathy (HCC)  Assessment & Plan  · Continue metoprolol tartrate 100 mg Q 12  Diltiazem 120 mg q day  Essential hypertension  Assessment & Plan  · On both Cardizem and metoprolol  Hold furosemide and resume on discharge  · EF 50 % Eli 3, 2020     Hypokalemia  Assessment & Plan  · K 3 4 today  · Replaced  · Will continue to monitor   · AM labs     Type 2 diabetes mellitus without complication, without long-term current use of insulin [facility])  Assessment & Plan    Lab Results   Component Value Date    HGBA1C 7 9 (H) 06/25/2020      · hold metformin  · Check blood sugars before meals and before bedtime with appropriate insulin sliding scale  Hyperlipidemia  Assessment & Plan  · Continue Zocor  · Lipid panel normal     Bullous pemphigoid  Assessment & Plan  · Continue prednisone 15 mg and mycophenolate  VTE Pharmacologic Prophylaxis:   Pharmacologic: Enoxaparin (Lovenox)  Mechanical VTE Prophylaxis in Place: Yes    Patient Centered Rounds: I have performed bedside rounds with nursing staff today  Discussions with Specialists or Other Care Team Provider: none    Education and Discussions with Family / Patient: patient    Time Spent for Care: 20 minutes  More than 50% of total time spent on counseling and coordination of care as described above      Current Length of Stay: 0 day(s)    Current Patient Status: Inpatient   Certification Statement: The patient will continue to require additional inpatient hospital stay due to hypokalemia and syncope     Discharge Plan: pending improvement likely tomorrow if stable     Code Status: Level 1 - Full Code      Subjective:   Patient offers no complaints today  Does not remember passing out just waking up on the ground  Denies and pain or headaches  Objective:     Vitals:   Temp (24hrs), Av 9 °F (36 6 °C), Min:97 3 °F (36 3 °C), Max:98 6 °F (37 °C)    Temp:  [97 3 °F (36 3 °C)-98 6 °F (37 °C)] 97 3 °F (36 3 °C)  HR:  [] 62  Resp:  [16-48] 18  BP: ()/(59-87) 129/76  SpO2:  [96 %-100 %] 100 %  Body mass index is 23 58 kg/m²  Input and Output Summary (last 24 hours): Intake/Output Summary (Last 24 hours) at 2020 1324  Last data filed at 2020 0830  Gross per 24 hour   Intake 180 ml   Output 1050 ml   Net -870 ml       Physical Exam:     Physical Exam   Constitutional: She is oriented to person, place, and time  She appears well-developed and well-nourished  HENT:   Head: Normocephalic and atraumatic  Neck: Normal range of motion  Neck supple  Cardiovascular: Normal rate and regular rhythm  No murmur heard  Pulmonary/Chest: Effort normal  No respiratory distress  She has no wheezes  Abdominal: Soft  Bowel sounds are normal    Musculoskeletal: Normal range of motion  She exhibits no edema  Neurological: She is alert and oriented to person, place, and time  Skin: Skin is warm and dry  Psychiatric: She has a normal mood and affect   Her behavior is normal  Judgment and thought content normal          Additional Data:     Labs:    Results from last 7 days   Lab Units 20  0437   WBC Thousand/uL 7 27   HEMOGLOBIN g/dL 12 1   HEMATOCRIT % 36 8   PLATELETS Thousands/uL 187   NEUTROS PCT % 69   LYMPHS PCT % 21   MONOS PCT % 8   EOS PCT % 0     Results from last 7 days   Lab Units 20  0432 20  1740   SODIUM mmol/L 138 131*   POTASSIUM mmol/L 3 4* 3 9   CHLORIDE mmol/L 101 97*   CO2 mmol/L 28 21   BUN mg/dL 20 22 CREATININE mg/dL 1 02 1 25   ANION GAP mmol/L 9 13   CALCIUM mg/dL 8 4 9 2   ALBUMIN g/dL  --  3 4*   TOTAL BILIRUBIN mg/dL  --  0 69   ALK PHOS U/L  --  65   ALT U/L  --  81*   AST U/L  --  82*   GLUCOSE RANDOM mg/dL 141* 275*     Results from last 7 days   Lab Units 06/25/20  1740   INR  1 12     Results from last 7 days   Lab Units 06/26/20  1111 06/26/20  0740 06/25/20  2252   POC GLUCOSE mg/dl 202* 117 226*     Results from last 7 days   Lab Units 06/25/20  2259   HEMOGLOBIN A1C % 7 9*               * I Have Reviewed All Lab Data Listed Above  * Additional Pertinent Lab Tests Reviewed: [address] Admission Reviewed    Imaging:    Imaging Reports Reviewed Today Include: none  Imaging Personally Reviewed by Myself Includes:  none    Recent Cultures (last 7 days):           Last 24 Hours Medication List:     Current Facility-Administered Medications:  acetaminophen 650 mg Oral Q6H PRN Charanjit Lr MD   aluminum-magnesium hydroxide-simethicone 30 mL Oral Q6H PRN Charanjit Lr MD   apixaban 5 mg Oral BID Charanjit Lr MD   aspirin 81 mg Oral Daily Charanjit Lr MD   diltiazem 120 mg Oral Daily Charanjit Lr MD   docusate sodium 100 mg Oral BID PRN Charanjit Lr MD   dorzolamide-timolol 1 drop Both Eyes Q12H [address] Charanjit Lr MD   insulin lispro 1-5 Units Subcutaneous TID AC Charanjit Lr MD   latanoprost 1 drop Both Eyes HS Charanjit Lr MD   metoprolol tartrate 100 mg Oral Q12H [address] Charanjit Lr MD   mycophenolate 1,000 mg Oral Q12H [address] Charanjit Lr MD   ondansetron 4 mg Intravenous Q6H PRN Charanjit Lr MD   pravastatin 40 mg Oral Daily With Eva Moore MD   predniSONE 15 mg Oral Daily Charanjit Lr MD        Today, Patient Was Seen By: ARLEEN Mtz    ** Please Note: Dictation voice to text software may have been used in the creation of this document   **

## 2020-06-26 NOTE — H&P
H&P- Zan Roberts 1935, 81 y o  female MRN: [id number]    Unit/Bed#: ED 15 Encounter: [id number]    Primary Care Provider: Marc Hong DO   Date and time admitted to hospital: 6/25/2020  5:34 PM        * Syncope and collapse  Assessment & Plan  Mildly elevated BUN creatinine from previous, on Lasix  Dehydration?    Will put on hold Lasix  Mild hydration  Telemetry  Orthostatics  Type 2 diabetes mellitus without complication, without long-term current use of insulin [facility])  Assessment & Plan    Lab Results   Component Value Date    HGBA1C 7 6 (H) 06/03/2020   Inpatient purposes, will put on hold metformin  Check blood sugars before meals and before bedtime with appropriate insulin sliding scale  Hemoglobin A1c  Hyperlipidemia  Assessment & Plan  Continue Zocor  Check lipid profile  Essential hypertension  Assessment & Plan  On both Cardizem and metoprolol  Hold furosemide  Apical variant hypertrophic cardiomyopathy (HCC)  Assessment & Plan  Continue metoprolol tartrate 100 mg Q 12  Diltiazem 120 mg q day  Bullous pemphigoid  Assessment & Plan  Continue prednisone 15 mg and mycophenolate  VTE Prophylaxis: Apixaban (Eliquis)  / sequential compression device   Code Status: Prior full Code  POLST: There is no POLST form on file for this patient (pre-hospital)    Anticipated Length of Stay:  Patient will be admitted on an Observation basis with an anticipated length of stay of  less than 2 midnights  Justification for Hospital Stay: Please see detailed plans noted above  Chief Complaint:     Syncope for 30 minutes  History of Present Illness:  Zan Roberts is a 81 y o  female who has a history of atrial fibrillation, CAD, recent pulmonary embolism currently on Eliquis, diabetes mellitus with hypertension  Reportedly the patient had an unwitnessed fall approximately 9:00 a m   Noted is that the patient lives by herself    She noted that it was just prior to approximately 9:00 a m  when she had the episode in time for her morning medication  She woke up from the floor  However, she did not tell her daughter about the episode as Todd Burr is busy at [facility]  Having told her later, she was therefore brought to the emergency room for evaluation  According to history, there was head strike although there is no sign of any visible trauma  Likewise, the patient does not have any neurological deficits  There is no traumatic injuries  Currently, patient is lying flat on bed and comfortable  Patient does not have any conversational dyspnea  Patient claims that over the past week or so, the patient has been having increased dizziness when she gets up quickly      Review of Systems:    Constitutional:  Denies fever or chills   Eyes:  Denies change in visual acuity   HENT:  Denies nasal congestion or sore throat   Respiratory:  Denies cough or shortness of breath   Cardiovascular:  Denies chest pain or edema   GI:  Denies abdominal pain, nausea, vomiting, bloody stools or diarrhea   :  Denies dysuria   Musculoskeletal:  Denies back pain or joint pain   Integument:  Denies rash   Neurologic:  Denies headache, focal weakness or sensory changes   Endocrine:  Denies polyuria or polydipsia   Lymphatic:  Denies swollen glands   Psychiatric:  Denies depression or anxiety     Past Medical and Surgical History:   Past Medical History:   Diagnosis Date    Abnormal heart rhythms     Cardiac disease     2 stents    Diabetes mellitus ([facility] [address] )     borderline    Hyperlipidemia     Hypertension     Migraines     [facility]      Past Surgical History:   Procedure Laterality Date    COLONOSCOPY      DILATION AND CURETTAGE, DIAGNOSTIC / THERAPEUTIC      SKIN BIOPSY      TONSILLECTOMY      WISDOM TOOTH EXTRACTION      X3        Meds/Allergies:    (Not in a hospital admission)  apixaban (ELIQUIS) 5 mg Take 2 tablets (10 mg total) by mouth 2 (two) times a day for 7 days, THEN 1 tablet (5 mg total) 2 (two) times a day for 23 days  Joie Schwartz MD Reordered   Ordered as: apixaban (ELIQUIS) tablet 5 mg - 5 mg, Oral, 2 times daily, First dose on Thu 6/25/20 at 2230 High alert medication Indication: non-valvular atrial fibrillation   ASPIRIN 81 PO Take 81 mg by mouth Joie Schwartz MD Reordered   Ordered as: aspirin chewable tablet 81 mg - 81 mg, Oral, Daily, First dose on Fri 6/26/20 at 0900   brimonidine-timolol (COMBIGAN) 0 2-0 5 % Apply 1 drop to eye every 12 (twelve) hours Joie Schwartz MD Reordered   Ordered as: dorzolamide-timolol (COSOPT) 22 3-6 8 MG/ML ophthalmic solution 1 drop - 1 drop, Both Eyes, Every 12 hours scheduled, First dose on Thu 6/25/20 at 2230   diltiazem (CARDIZEM CD) 120 mg 24 hr capsule Take 1 capsule (120 mg total) by mouth daily Joie Schwartz MD Reordered   Ordered as: diltiazem (CARDIZEM CD) 24 hr capsule 120 mg - 120 mg, Oral, Daily, First dose on Fri 6/26/20 at 0900 Hold for heart rate less than 50 beats per minute  Swallow whole; do not crush, chew or empty contents  Hold for systolic blood pressure less than (mmHg): 110   furosemide (LASIX) 20 mg tablet Take 1 tablet (20 mg total) by mouth daily Joie Schwartz MD Not Ordered   Latanoprost 0 005 % EMUL Apply 1 drop to eye Joie Schwartz MD Reordered   Ordered as: latanoprost (XALATAN) 0 005 % ophthalmic solution 1 drop - 1 drop, Both Eyes, Daily at bedtime, First dose on Thu 6/25/20 at 2230   metFORMIN (GLUCOPHAGE) 500 mg tablet Take 500 mg by mouth Joie Schwartz MD Not Ordered   metoprolol tartrate (LOPRESSOR) 100 mg tablet Take 1 tablet (100 mg total) by mouth every 12 (twelve) hours Joie Schwartz MD Reordered   Ordered as: metoprolol tartrate (LOPRESSOR) tablet 100 mg - 100 mg, Oral, Every 12 hours scheduled, First dose on Thu 6/25/20 at 2230 Hold for heart rate less than 50 beats per minute   LOOK ALIKE SOUND ALIKE MED Hold for systolic blood pressure less than (mmHg): 110   mycophenolate (CELLCEPT) 500 mg tablet TAKE ONE TABLET BY MOUTH TWICE A DAY FOR 1 WEEK  THEN INCREASE TO 2 TABLETS TWICE DAILY Ibrahima Sexton MD Reordered   Ordered as: mycophenolate (CELLCEPT) capsule 1,000 mg - 1,000 mg, Oral, Every 12 hours scheduled, First dose on 20 at [facility]; use appropriate precautions for handling and disposal; should be given on empty stomach (1hr before or 2hr after food); swallow whole; do not crush, chew or empty contents    predniSONE 10 mg tablet 10 mg in AM Ibrahima Sexton MD Reordered   Ordered as: predniSONE tablet 15 mg - 15 mg, Oral, Daily, First dose on 20 at 0900 Administer with food or milk  LOOK ALIKE SOUND ALIKE MED    simvastatin (ZOCOR) 20 mg tablet TAKE ONE-HALF TABLET BY MOUTH EVERY EVENING Ibrahima Sexton MD Reordered   Ordered as: pravastatin (PRAVACHOL) tablet 40 mg - 40 mg, Oral, Daily with dinner, First dose on 20 at 1630   triamcinolone (KENALOG) 0 1 % ointment Apply topically twice a day to affected areas Ibrahima Sexton MD Not Ordered   hydrOXYzine HCL (ATARAX) 10 mg tablet TAKE ONE TABLET BY MOUTH THREE TIMES A DAY AS NEEDED FOR ITCHING Ibrahima Sexton MD Not Ordered   mupirocin (BACTROBAN) 2 % ointment APPLY TWICE DAILY TO SURGICAL SITE FOR 2 WEEKS Ibrahima Sexton MD Not Ordered       Allergies: Allergies   Allergen Reactions    Losartan     Sulfa Antibiotics      History:  Marital Status:     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  Patient Pre-hospital Living Situation: home alone  Patient Pre-hospital Level of Mobility: ambulatory  Patient Pre-hospital Diet Restrictions: diabetic and cardiac  Substance Use History:   Social History     Substance and Sexual Activity   Alcohol Use Yes    Comment: rare      Social History     Tobacco Use   Smoking Status Former Smoker    Years: 35 00    Types: Cigarettes    Last attempt to quit: 1974    Years since quittin 5   Smokeless Tobacco Never Used   Tobacco Comment    social smoker      Social History     Substance and Sexual Activity   Drug Use Never       Family History:  Family History   Problem Relation Age of Onset    Heart disease Father     Heart attack Sister     No Known Problems Sister     Lupus Sister     Cataracts Sister     Hypertension Sister     Stroke Daughter     Neuropathy Daughter     Allergies Daughter     Cancer Daughter        Physical Exam:     Vitals:   Blood Pressure: 142/65 (06/25/20 2100)  Pulse: 100 (06/25/20 2100)  Temperature: 98 6 °F (37 °C) (06/25/20 1735)  Temp Source: Tympanic (06/25/20 1735)  Respirations: 21 (06/25/20 2100)  SpO2: 98 % (06/25/20 2100)    Constitutional:  Well developed, well nourished, no acute distress, non-toxic appearance   Eyes:  PERRL, conjunctiva normal   HENT:  Atraumatic, external ears normal, nose normal, oropharynx moist, no pharyngeal exudates  Neck- normal range of motion, no tenderness, supple   Respiratory:  No respiratory distress, normal breath sounds, no rales, no wheezing   Cardiovascular:  Normal rate, normal rhythm, no murmurs, no gallops, no rubs   GI:  Soft, nondistended, normal bowel sounds, nontender, no organomegaly, no mass, no rebound, no guarding   :  No costovertebral angle tenderness   Musculoskeletal:  No edema, no tenderness, no deformities  Back- no tenderness  Integument:  Well hydrated, no rash   Lymphatic:  No lymphadenopathy noted   Neurologic:  Alert &awake, communicative, CN 2-12 normal, normal motor function, normal sensory function, no focal deficits noted   Psychiatric:  Speech and behavior appropriate       Lab Results: I have personally reviewed pertinent reports        Results from last 7 days   Lab Units 06/25/20  1740   WBC Thousand/uL 10 77*   HEMOGLOBIN g/dL 13 7   HEMATOCRIT % 42 0   PLATELETS Thousands/uL 263   NEUTROS PCT % 83*   LYMPHS PCT % 11*   MONOS PCT % 4   EOS PCT % 0     Results from last 7 days   Lab Units 06/25/20  1740 06/25/20  1739   POTASSIUM mmol/L 3 9  -- CHLORIDE mmol/L 97*  --    CO2 mmol/L 21  --    CO2, I-STAT mmol/L  --  24   BUN mg/dL 22  --    CREATININE mg/dL 1 25  --    CALCIUM mg/dL 9 2  --    ALK PHOS U/L 65  --    ALT U/L 81*  --    AST U/L 82*  --    GLUCOSE, ISTAT mg/dl  --  279*     Results from last 7 days   Lab Units 06/25/20  1740   INR  1 12       EKG:  Age and gender specific ECG analysis   Atrial fibrillation  ST & T wave abnormality, consider inferolateral ischemia or digitalis effect  Abnormal ECG  When compared with ECG of 02-JUN-2020 14:19,  Criteria for Anteroseptal infarct are no longer Present  Confirmed by Reid Tobin ([id number]) on 6/25/2020 5:57:03 PM    Imaging: I have personally reviewed pertinent reports  Xr Chest 1 View Portable    Result Date: 6/2/2020  Narrative: CHEST INDICATION:   sob  COMPARISON:  None EXAM PERFORMED/VIEWS:  XR CHEST PORTABLE FINDINGS: Cardiomediastinal silhouette appears unremarkable  The lungs are clear  Tiny benign calcified granuloma in the right upper lung  No pneumothorax or pleural effusion  Osseous structures appear within normal limits for patient age  Impression: No acute cardiopulmonary disease  Workstation performed: [id number]     Ct Head Without Contrast    Result Date: 6/25/2020  Narrative: CT BRAIN - WITHOUT CONTRAST INDICATION:   trauma  COMPARISON:  None  TECHNIQUE:  CT examination of the brain was performed  In addition to axial images, coronal 2D reformatted images were created and submitted for interpretation  Radiation dose length product (DLP) for this visit:  857 42 mGy-cm   This examination, like all CT scans performed in the [facility], was performed utilizing techniques to minimize radiation dose exposure, including the use of iterative  reconstruction and automated exposure control  IMAGE QUALITY:  Diagnostic  FINDINGS: PARENCHYMA:  No intracranial mass, mass effect or midline shift  No CT signs of acute infarction  No acute parenchymal hemorrhage  Atherosclerotic calcification of the vessels at the skull base are noted  Possible small bilateral chronic basal ganglia  lacunar infarct versus dilated perivascular spaces  VENTRICLES AND EXTRA-AXIAL SPACES:  Normal for the patient's age  Probable tiny 7 mm right paramedian posterior parietal calcified meningioma without appreciable mass effect with questionable small calcified meningioma the right sphenoid wing extending into the anterior aspect of the middle cranial fossa  No appreciable mass effect  VISUALIZED ORBITS AND PARANASAL SINUSES:  Bilateral ocular lens implants are present  Visualized paranasal sinuses are clear  CALVARIUM AND EXTRACRANIAL SOFT TISSUES:  Small right posterior parietal scalp contusion with laceration with a small amount subcutaneous air is identified  No underlying fracture noted  Impression: 1  Small right paramedian posterior parietal scalp contusion /laceration without evidence for fracture or acute intracranial abnormality  I personally discussed this study with Nicki Bal on 6/25/2020 at 6:05 PM  Workstation performed: [id number]     Ct Cervical Spine Without Contrast    Result Date: 6/25/2020  Narrative: CT CERVICAL SPINE - WITHOUT CONTRAST INDICATION:   trauma  COMPARISON:  None  TECHNIQUE:  CT examination of the cervical spine was performed without intravenous contrast   Contiguous axial images were obtained  Sagittal and coronal reconstructions were performed  Radiation dose length product (DLP) for this visit:  321 85 mGy-cm   This examination, like all CT scans performed in the [facility], was performed utilizing techniques to minimize radiation dose exposure, including the use of iterative  reconstruction and automated exposure control  IMAGE QUALITY:  Diagnostic  FINDINGS: ALIGNMENT:  Normal alignment of the cervical spine  No subluxation  VERTEBRAL BODIES:  No fracture   DEGENERATIVE CHANGES:  No significant cervical degenerative changes are noted  PREVERTEBRAL AND PARASPINAL SOFT TISSUES:  Bilateral cervical carotid artery calcification present with left carotid artery stent  THORACIC INLET:  Normal      Impression: No cervical spine fracture or traumatic malalignment  I personally discussed this study with Francisco Saini on 6/25/2020 at 6:10 PM   Workstation performed: [id number]     Xr Trauma Multiple    Result Date: 6/25/2020  Narrative: TRAUMA SERIES INDICATION:  trauma  COMPARISON:  None VIEWS:  XR TRAUMA MULTIPLE  FINDINGS: CHEST: Supine frontal view of the chest is obtained  Cardiomegaly is noted  Lungs are clear  No layering pleural effusions detected  No pneumothorax is seen on this supine film  Upright images are more sensitive to detect anterior pneumothoraces if relevant  No displaced fractures  Impression: No acute cardiopulmonary disease within limitations of supine imaging  Workstation performed: [id number]     Cta Ed Chest Pe Study    Result Date: 6/2/2020  Narrative: CTA - CHEST WITH IV CONTRAST - PULMONARY ANGIOGRAM INDICATION:   sob, moise, elevated D Dimer  COMPARISON:  Chest radiograph performed earlier today TECHNIQUE: CTA examination of the chest was performed using angiographic technique according to a protocol specifically tailored to evaluate for pulmonary embolism  Axial, sagittal, and coronal 2D reformatted images were created from the source data and  submitted for interpretation  In addition, coronal 3D MIP postprocessing was performed on the acquisition scanner  Radiation dose length product (DLP) for this visit:  484 22 mGy-cm   This examination, like all CT scans performed in the [facility], was performed utilizing techniques to minimize radiation dose exposure, including the use of iterative  reconstruction and automated exposure control   IV Contrast:  100 mL of iohexol (OMNIPAQUE)  FINDINGS: PULMONARY ARTERIAL TREE:  There are filling defects in a right upper lobe segmental artery (2/68) in multiple right lower lobe subsegmental arteries (2/127, 2/140, 2/144 in 2/148)  No definite filling defects in the left pulmonary arterial system  LUNGS:  Subsegmental atelectasis in the right lower lobe  Scattered bilateral punctate calcified granulomas  No solid nodule or mass  There is no tracheal or endobronchial lesion  PLEURA:  Small right pleural effusion  No pneumothorax  HEART/GREAT VESSELS:  Calcified aortic and coronary artery atherosclerosis  Mild cardiomegaly  Left ventricular hypertrophy and right and left atrial dilation  Measured RV/LV ratio is within normal limits at less than 0 9  MEDIASTINUM AND AUGUSTIN:  Unremarkable  CHEST WALL AND LOWER NECK:   Unremarkable  VISUALIZED STRUCTURES IN THE UPPER ABDOMEN:  Unremarkable  OSSEOUS STRUCTURES:  No acute fracture or destructive osseous lesion  Impression: 1  Multiple right-sided segmental and subsegmental pulmonary emboli  Measured RV/LV ratio is within normal limits at less than 0 9  2   Small right pleural effusion  I personally discussed this study with Devin Rabago on 6/2/2020 at 5:29 PM  Workstation performed: [id number]     Vas Lower Limb Venous Duplex Study, Complete Bilateral    Result Date: 6/6/2020  Narrative:  [facility] REPORT CLINICAL: Indications: Patient presents with recent discovery of pulmonary embolism and physician wants to determine potential source  Patient denies any leg pain  Operative History: No cardiovascular surgeries Risk Factors The patient has history of HTN, Diabetes, HLD and PAD  FINDINGS:  Segment  Right            Left              Impression       Impression       CFV      Normal (Patent)  Normal (Patent)     CONCLUSION:  Impression: RIGHT LOWER LIMB: No evidence of acute or chronic deep vein thrombosis  No evidence of superficial thrombophlebitis noted  Doppler evaluation shows a normal response to augmentation maneuvers   Popliteal, posterior tibial and anterior tibial arterial Doppler waveforms are biphasic  LEFT LOWER LIMB: No evidence of acute or chronic deep vein thrombosis  No evidence of superficial thrombophlebitis noted  Doppler evaluation shows a normal response to augmentation maneuvers  Popliteal, posterior tibial and anterior tibial arterial Doppler waveforms are biphasic  Technical findings were faxed to chart  SIGNATURE: Electronically Signed by: Joaquina Koehler on 2020-06-06 12:54:42 PM        ** Please Note: Dragon 360 Dictation voice to text software was used in the creation of this document   **

## 2020-06-26 NOTE — PLAN OF CARE
Problem: Potential for Falls  Goal: Patient will remain free of falls  Description  INTERVENTIONS:  - Assess patient frequently for physical needs  -  Identify cognitive and physical deficits and behaviors that affect risk of falls    -  [place] fall precautions as indicated by assessment   - Educate patient/family on patient safety including physical limitations  - Instruct patient to call for assistance with activity based on assessment  - Modify environment to reduce risk of injury  - Consider OT/PT consult to assist with strengthening/mobility  Outcome: Progressing     Problem: PAIN - ADULT  Goal: Verbalizes/displays adequate comfort level or baseline comfort level  Description  Interventions:  - Encourage patient to monitor pain and request assistance  - Assess pain using appropriate pain scale  - Administer analgesics based on type and severity of pain and evaluate response  - Implement non-pharmacological measures as appropriate and evaluate response  - Consider cultural and social influences on pain and pain management  - Notify physician/advanced practitioner if interventions unsuccessful or patient reports new pain  Outcome: Progressing     Problem: INFECTION - ADULT  Goal: Absence or prevention of progression during hospitalization  Description  INTERVENTIONS:  - Assess and monitor for signs and symptoms of infection  - Monitor lab/diagnostic results  - Monitor all insertion sites, i e  indwelling lines, tubes, and drains  - Monitor endotracheal if appropriate and nasal secretions for changes in amount and color  - [place] appropriate cooling/warming therapies per order  - Administer medications as ordered  - Instruct and encourage patient and family to use good hand hygiene technique  - Identify and instruct in appropriate isolation precautions for identified infection/condition  Outcome: Progressing  Goal: Absence of fever/infection during neutropenic period  Description  INTERVENTIONS:  - Monitor WBC    Outcome: Progressing     Problem: SAFETY ADULT  Goal: Maintain or return to baseline ADL function  Description  INTERVENTIONS:  -  Assess patient's ability to carry out ADLs; assess patient's baseline for ADL function and identify physical deficits which impact ability to perform ADLs (bathing, care of mouth/teeth, toileting, grooming, dressing, etc )  - Assess/evaluate cause of self-care deficits   - Assess range of motion  - Assess patient's mobility; develop plan if impaired  - Assess patient's need for assistive devices and provide as appropriate  - Encourage maximum independence but intervene and supervise when necessary  - Involve family in performance of ADLs  - Assess for home care needs following discharge   - Consider OT consult to assist with ADL evaluation and planning for discharge  - Provide patient education as appropriate  Outcome: Progressing  Goal: Maintain or return mobility status to optimal level  Description  INTERVENTIONS:  - Assess patient's baseline mobility status (ambulation, transfers, stairs, etc )    - Identify cognitive and physical deficits and behaviors that affect mobility  - Identify mobility aids required to assist with transfers and/or ambulation (gait belt, sit-to-stand, lift, walker, cane, etc )  - [place] fall precautions as indicated by assessment  - Record patient progress and toleration of activity level on Mobility SBAR; progress patient to next Phase/Stage  - Instruct patient to call for assistance with activity based on assessment  - Consider rehabilitation consult to assist with strengthening/weightbearing, etc   Outcome: Progressing     Problem: DISCHARGE PLANNING  Goal: Discharge to home or other facility with appropriate resources  Description  INTERVENTIONS:  - Identify barriers to discharge w/patient and caregiver  - Arrange for needed discharge resources and transportation as appropriate  - Identify discharge learning needs (meds, wound care, etc )  - Arrange for interpretive services to assist at discharge as needed  - Refer to Case Management Department for coordinating discharge planning if the patient needs post-hospital services based on physician/advanced practitioner order or complex needs related to functional status, cognitive ability, or social support system  Outcome: Progressing     Problem: Knowledge Deficit  Goal: Patient/family/caregiver demonstrates understanding of disease process, treatment plan, medications, and discharge instructions  Description  Complete learning assessment and assess knowledge base    Interventions:  - Provide teaching at level of understanding  - Provide teaching via preferred learning methods  Outcome: Progressing

## 2020-06-26 NOTE — UTILIZATION REVIEW
Initial Clinical Review    OBSERVATION 6/25/20 @ 2025 - CHANGED TO INPATIENT ON 6/26/20 @ 1321    Inpatient Admission Orders (From admission, onward)     Ordered        06/26/20 1321  Inpatient Admission  Once                   Orders Placed This Encounter   Procedures    Inpatient Admission     Standing Status:   Standing     Number of Occurrences:   1     Order Specific Question:   Admitting Physician     Answer:   Karlos Smith     Order Specific Question:   Level of Care     Answer:   Med Surg [16]     Order Specific Question:   Estimated length of stay     Answer:   More than 2 Midnights     Order Specific Question:   Certification     Answer:   I certify that inpatient services are medically necessary for this patient for a duration of greater than two midnights  See H&P and MD Progress Notes for additional information about the patient's course of treatment  ED Arrival Information     Expected Arrival Acuity Means of Arrival Escorted By Service Admission Type    - 6/25/2020 17:26 [facility] Hospitalist Emergency    Arrival Complaint    fall head strike on blood thinners        Chief Complaint   Patient presents with    Syncope     syncopal episode with headstrike  + LOC     Assessment/Plan:   Mrs Hossein Leach is an 80 yo female who presents to the ED from home post unwitnessed fall to the floor on Eliquis with LOC  Drove herself to ED  No incontinence of tongue-biting  She is admitted to OBSERVATION status with Syncope and Collapse - mildly elevated BUN/CR on Lasix, possible dehydration - hold Lasix, IV hydration, Tele, orthostatics  Type 2 NIDDM - SSI cover  PMH: Afib, CAD, recent PE, DM, HTN, HLD, Atypical variant hypertrophic CM,  Bullous pemphigoid         ED Triage Vitals   Temperature Pulse Respirations Blood Pressure SpO2   06/25/20 1735 06/25/20 1735 06/25/20 1735 06/25/20 1735 06/25/20 1735   98 6 °F (37 °C) 98 18 122/83 97 %      Temp Source Heart Rate Source Patient Position - Orthostatic VS BP Location FiO2 (%)   06/25/20 1735 06/25/20 1735 06/25/20 1735 06/25/20 1900 --   Tympanic Monitor Lying Right arm       Pain Score       06/25/20 2315       No Pain        Wt Readings from Last 1 Encounters:   06/26/20 62 3 kg (137 lb 5 6 oz)     Additional Vital Signs:   06/26/20 11:13:59  97 3 °F (36 3 °C)Abnormal   62  18  129/76  94  100 %       06/26/20 07:42:57  98 2 °F (36 8 °C)  56  18  134/79  97  98 %       06/25/20 22:45:32    84    91/59  70  97 %  None (Room air)  Standing - Orthostatic VS   06/25/20 22:41:37  97 6 °F (36 4 °C)  126Abnormal     115/82  93  99 %    Sitting - Orthostatic VS   06/25/20 22:39:42  97 6 °F (36 4 °C)  101  16  138/85  103  100 %    Lying - Orthostatic VS   06/25/20 2238    93    138/85        Lying - Orthostatic VS   06/25/20 2100    100  21  142/65  93  98 %  None (Room air)  Lying   06/25/20 2035    104  37Abnormal       99 %       06/25/20 2031            99 %       06/25/20 20:30:17    92  20  132/76    98 %    Lying   06/25/20 2030    134Abnormal   37Abnormal              06/25/20 2019    96  33Abnormal              06/25/20 2018            98 %       06/25/20 2015    112Abnormal   48Abnormal       98 %       06/25/20 2010    100  28Abnormal       97 %       06/25/20 2005    90  24Abnormal       98 %       06/25/20 2000    90  22  133/73    98 %    Lying   06/25/20 1955    88  18      98 %       06/25/20 1950    86  23Abnormal       98 %       06/25/20 1945    88  26Abnormal       98 %       06/25/20 1940    88  23Abnormal       98 %       06/25/20 1935    92  30Abnormal       97 %       06/25/20 1930    86  24Abnormal   119/64    97 %    Lying   06/25/20 1925    86  17      98 %       06/25/20 1920    92  23Abnormal       98 %       06/25/20 1915    88  23Abnormal       98 %       06/25/20 1910    90  27Abnormal       98 %     06/25/20 1905    86  20      97 %       06/25/20 1900    90  16  115/87  99  98 %  None (Room air)  Lying   06/25/20 1855    86  17      97 %       06/25/20 1850    92  26Abnormal       97 %       06/25/20 1845    92  18  115/87    97 %    Lying   06/25/20 1840    92  20      97 %       06/25/20 1835    96  24Abnormal       97 %       06/25/20 1830    88  24Abnormal   114/65    96 %    Lying   06/25/20 1825    90  23Abnormal       97 %       06/25/20 1820    90  20      97 %       06/25/20 1815    88  21  126/69    96 %    Lying   06/25/20 1810    100  20      97 %       06/25/20 1805    100  28Abnormal       96 %       06/25/20 1800    100  25Abnormal   126/69    97 %    Lying   06/25/20 1756    100  30Abnormal       96 %       06/25/20 17:54:47        132/70           06/25/20 1752    104  35Abnormal              06/25/20 1750    103  18  127/79    97 %    Lying   06/25/20 17:39:47    100        97 %         Pertinent Labs/Diagnostic Test Results:     6/25 Xray trauma, CXR,  - No acute cardiopulmonary disease within limitations of supine imaging  6/25 CT head - 1  Small right paramedian posterior parietal scalp contusion /laceration without evidence for fracture or acute intracranial abnormality      6/25 CT C spine - no acute injury    6/25 ECG - Atrial fibrillation  ST & T wave abnormality, consider inferolateral ischemia or digitalis effect  Abnormal ECG  When compared with ECG of 02-JUN-2020 14:19,  Criteria for Anteroseptal infarct are no longer Present    Results from last 7 days   Lab Units 06/26/20  0437 06/25/20  1740 06/25/20  1739   WBC Thousand/uL 7 27 10 77*  --    HEMOGLOBIN g/dL 12 1 13 7  --    I STAT HEMOGLOBIN g/dl  --   --  13 9   HEMATOCRIT % 36 8 42 0  --    HEMATOCRIT, ISTAT %  --   --  41   PLATELETS Thousands/uL 187 263  --    NEUTROS ABS Thousands/µL 4 98 8 78*  --          Results from last 7 days Lab Units 06/26/20  0432 06/25/20  1740 06/25/20  1739   SODIUM mmol/L 138 131*  --    POTASSIUM mmol/L 3 4* 3 9  --    CHLORIDE mmol/L 101 97*  --    CO2 mmol/L 28 21  --    CO2, I-STAT mmol/L  --   --  24   ANION GAP mmol/L 9 13  --    BUN mg/dL 20 22  --    CREATININE mg/dL 1 02 1 25  --    EGFR ml/min/1 73sq m 51 40  --    CALCIUM mg/dL 8 4 9 2  --    CALCIUM, IONIZED, ISTAT mmol/L  --   --  1 18   MAGNESIUM mg/dL 2 1  --   --    PHOSPHORUS mg/dL 2 6  --   --      Results from last 7 days   Lab Units 06/25/20  1740   AST U/L 82*   ALT U/L 81*   ALK PHOS U/L 65   TOTAL PROTEIN g/dL 6 0*   ALBUMIN g/dL 3 4*   TOTAL BILIRUBIN mg/dL 0 69     Results from last 7 days   Lab Units 06/26/20  1111 06/26/20  0740 06/25/20  2252   POC GLUCOSE mg/dl 202* 117 226*     Results from last 7 days   Lab Units 06/26/20  0432 06/25/20  1740   GLUCOSE RANDOM mg/dL 141* 275*     Results from last 7 days   Lab Units 06/25/20  2259   HEMOGLOBIN A1C % 7 9*   EAG mg/dl 180     Results from last 7 days   Lab Units 06/25/20  1739   PH, MARYAN I-STAT  7 427*   PCO2, MARYAN ISTAT mm HG 35 4*   PO2, MARYAN ISTAT mm HG 39 0   HCO3, MARYAN ISTAT mmol/L 23 4*   I STAT BASE EXC mmol/L -1   I STAT O2 SAT % 75         Results from last 7 days   Lab Units 06/26/20  0432 06/26/20  0210 06/25/20  2259 06/25/20  1740   TROPONIN I ng/mL 0 05* 0 05* 0 05* 0 06*         Results from last 7 days   Lab Units 06/25/20  1740   PROTIME seconds 14 4   INR  1 12   PTT seconds 23     Results from last 7 days   Lab Units 06/26/20  0432   TSH 3RD GENERATON uIU/mL 0 628     Results from last 7 days   Lab Units 06/26/20  0211   CLARITY UA  Clear   COLOR UA  Yellow   SPEC GRAV UA  1 011   PH UA  6 5   GLUCOSE UA mg/dl 500 (1/2%)*   KETONES UA mg/dl Negative   BLOOD UA  Negative   PROTEIN UA mg/dl Negative   NITRITE UA  Negative   BILIRUBIN UA  Negative   UROBILINOGEN UA E U /dl 0 2   LEUKOCYTES UA  Negative     ED Treatment:   Medication Administration from 06/25/2020 1726 to 06/25/2020 2222     None        Past Medical History:   Diagnosis Date    Abnormal heart rhythms     Cardiac disease     2 stents    Diabetes mellitus ([facility] )     borderline    Hyperlipidemia     Hypertension     Migraines     Stroke [facility])      Present on Admission:   Apical variant hypertrophic cardiomyopathy ([facility] [facility] )   Essential hypertension   Hyperlipidemia   Bullous pemphigoid   Type 2 diabetes mellitus without complication, without long-term current use of insulin ([facility])    Admitting Diagnosis: Unspecified multiple injuries, initial encounter [T07  XXXA]     Age/Sex: 81 y o  female     Admission Orders:  Scheduled Medications:    Medications:  apixaban 5 mg Oral BID   aspirin 81 mg Oral Daily   diltiazem 120 mg Oral Daily   dorzolamide-timolol 1 drop Both Eyes Q12H [address]   insulin lispro 1-5 Units Subcutaneous TID AC   latanoprost 1 drop Both Eyes HS   metoprolol tartrate 100 mg Oral Q12H DANII   mycophenolate 1,000 mg Oral Q12H DANII   pravastatin 40 mg Oral Daily With Dinner   predniSONE 15 mg Oral Daily     Continuous IV Infusions:    multi-electrolyte 60 mL/hr Intravenous Continuous     PRN Meds:    acetaminophen 650 mg Oral Q6H PRN   aluminum-magnesium hydroxide-simethicone 30 mL Oral Q6H PRN   docusate sodium 100 mg Oral BID PRN   ondansetron 4 mg Intravenous Q6H PRN     SCDs  Tele  Up w/ assist   POC GLUCOSE AC/HS WITH SSI COVERAGE   ADA diet   IP CONSULT TO CASE MANAGEMENT    Network Utilization Review Department  [email or web address]  ATTENTION: Please call with any questions or concerns to [phone] and carefully listen to the prompts so that you are directed to the right person  All voicemails are confidential   Rhoda Alonzo all requests for admission clinical reviews, approved or denied determinations and any other requests to dedicated fax number below belonging to the campus where the patient is receiving treatment   List of dedicated fax numbers for the Facilities:  FACILITY NAME UR FAX NUMBER   ADMISSION DENIALS (Administrative/Medical Necessity) [phone]   PARENT CHILD HEALTH (Maternity/NICU/Pediatrics) [phone]   [facility] [phone]   [facility] [phone]   Kamilla Jimenez [phone]   [address] [phone]   [facility] [phone]   [address] [phone]

## 2020-06-26 NOTE — CONSULTS
Consultation - Cardiology Team One  Stephie Sousa 81 y o  female MRN: [id number]  Unit/Bed#: [facility] 802-01 Encounter: [id number]    Inpatient consult to Cardiology  Consult performed by: Dania Hernandez PA-C  Consult ordered by: Nicolas Kanner, CRNP          Physician Requesting Consult: Mark Fong MD  Reason for Consult / Principal Problem:  Syncope    Assessment:  Syncope/collapse:  Patient felt lightheaded after working out in her yd in the sun  She was bent over pulling a box and when her symptoms started  Also states her house was hot because she had her AC off  She proceeded to take her morning blood pressure medications and subsequently lost consciousness  Denies any chest pain , shortness of breath or palpitations prior  CT head/C-spine with no acute traumatic injury  Small posterior scalp laceration  Orthostatics +  RAYMUNDO on admission  Telemetry with rate controlled Afib  Non MI troponin elevation:  Flat nonspecific troponin in the setting of RAYMUNDO  RAYMUNDO:  Creatinine 1 25 on admission  Baseline creatinine 0 8-1 0  Improved with IV fluids to 1 02 today  Persistent atrial fibrillation:  Diagnosed during admission 6/2/20 in the setting of acute PE  Maintained on Cardizem  20 mg daily and Lopressor 100 mg BID  Anticoagulated on Eliquis 5 mg BID  CAD: s/p COURTNEY to prox/mid LAD  Maintained on aspirin, statin, beta-blocker  Recent echocardiogram with no wall motion abnormalities  Low-normal LV systolic function:  Echocardiogram 6/3/20 with EF 50%, no WMA are no significant valvular  Maintained on Lasix 20 mg daily she is currently on hold for concerns of dehydration  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 on recent echocardiogram 6/2020  Recent PE:  Anticoagulated on Eliquis 5 mg BID  Essential hypertension:  Average /75 on Lopressor 100 mg BID Cardizem  mg daily      Hyperlipidemia:  Lipid panel 11/2019 [facility] , , HDL 58, LDL 49 on simvastatin 10 mg daily     Type 2 diabetes:  Hemoglobin A1c 7 6  Management per primary team     Plan/Recommendations:  · Feels better after receiving IV fluids  · Would continue to hold oral diuretics at this as patient does not appear volume overloaded on exam  · Continue Lopressor and Cardizem for rate control  · Recent echocardiogram reviewed without indication to repeat at this time  · Patient should liberalize fluid intake while working outside  · Follow-up with her primary cardiologist Dr Yanna Smith at [address] at which point re-initiation of oral diuretics can be discussed    _________________________________________________________    CC:  Syncope      History of Present Illness   HPI: Lisa Preston is a 81y o  year old female who has CAD s/p COURTNEY prox/mid LAD, ASD, low-normal LV systolic function hypertension, hyperlipidemia, diabetes, newly found atrial fibrillation in the setting of acute PE on admission earlier this month and was started on Eliquis who follows with cardiologist Dr Yanna Smith at [address] [facility] medications include Eliquis 5 mg BID, aspirin 81 mg daily, Cardizem  mg daily, Lopressor 100 mg BID, simvastatin 10 daily, Lasix 20 mg daily  Patient presented to the emergency room at [facility] 06/25/2020 with a syncopal episode  She reportedly on an unwitnessed fall as she lives by herself  Patient woke up feeling good and decided to do some pawn care  She was out in the sun trimming kennedy bushes  She then transferred the stems to a box  While bent over pulling the box into the house she started to feel lightheaded  She walked to her kitchen and sat down and took her morning medications  She continued to feel weak and lightheaded and woke up on the ground  She did not tell her daughter this until after at which she was taken to the ED for further evaluation  In the ED chest x-ray showed no acute cardiopulmonary disease    CT of the head revealed small right paramedian posterior parietal scalp contusion/laceration without evidence of fracture or acute intracranial abnormality  CT of the C-spine revealed no cervical fracture or traumatic malalignment  Labs reveal sodium 131, creatinine 1 25, AST 82, ALT 81, albumin 3 4, WBC 10, troponin 0 06--> 0 05--> 0 05--> 0 05  Patient was noted to have just a superficial posterior scalp laceration and is admitted under Medicine service for concerns of dehydration  IV Lasix was put on hold in cardiology was consulted for syncope  Patient resting in bed during consultation and denied any cp, sob or palpitations with her lightheadedness prior to syncope  She feels she may not have drank anything the morning she was working outside in the sun  Patient also states her house was hot as her AC was turned off  Echocardiogram 06/03/2020:  Low-normal LV systolic function EF 75%, no wall motion abnormality, normal RV size with mildly reduced function, mild-moderate LA dilatation, secundum atrial septal defect with left to right shunt, no significant valvular abnormality  EKG reviewed personally:     625/2020-atrial fibrillation/flutter with ventricular rate of 100 beats per minute with nonspecific inferolateral ST T-wave abnormality  No significant change when compared to EKG from 06/02/2020  Telemetry reviewed personally:  Atrial fibrillation with ventricular rates in the 70-80s  Review of Systems   Constitution: Negative  Negative for chills  Cardiovascular: Negative for chest pain, dyspnea on exertion, leg swelling, near-syncope, orthopnea, palpitations, paroxysmal nocturnal dyspnea and syncope  Respiratory: Negative  Negative for shortness of breath  Gastrointestinal: Negative for diarrhea, nausea and vomiting  Neurological: Negative for dizziness, light-headedness and weakness  Psychiatric/Behavioral: Negative for altered mental status  All other systems reviewed and are negative      Historical Information   Past Medical History:   Diagnosis Date    Abnormal heart rhythms     Cardiac disease     2 stents    Diabetes mellitus ([address] )     borderline    Hyperlipidemia     Hypertension     Migraines     Stroke [facility])      Past Surgical History:   Procedure Laterality Date    COLONOSCOPY      DILATION AND CURETTAGE, DIAGNOSTIC / THERAPEUTIC      SKIN BIOPSY      TONSILLECTOMY      WISDOM TOOTH EXTRACTION      X3      Social History     Substance and Sexual Activity   Alcohol Use Yes    Comment: rare      Social History     Substance and Sexual Activity   Drug Use Never     Social History     Tobacco Use   Smoking Status Former Smoker    Years: 35 00    Types: Cigarettes    Last attempt to quit:     Years since quittin 5   Smokeless Tobacco Never Used   Tobacco Comment    social smoker      Family History:   Family History   Problem Relation Age of Onset    Heart disease Father     Heart attack Sister     No Known Problems Sister     Lupus Sister     Cataracts Sister     Hypertension Sister     Stroke Daughter     Neuropathy Daughter     Allergies Daughter     Cancer Daughter        Meds/Allergies   all current active meds have been reviewed, current meds:   Current Facility-Administered Medications   Medication Dose Route Frequency    acetaminophen (TYLENOL) tablet 650 mg  650 mg Oral Q6H PRN    aluminum-magnesium hydroxide-simethicone (MYLANTA) 200-200-20 mg/5 mL oral suspension 30 mL  30 mL Oral Q6H PRN    apixaban (ELIQUIS) tablet 5 mg  5 mg Oral BID    aspirin chewable tablet 81 mg  81 mg Oral Daily    diltiazem (CARDIZEM CD) 24 hr capsule 120 mg  120 mg Oral Daily    docusate sodium (COLACE) capsule 100 mg  100 mg Oral BID PRN    dorzolamide-timolol (COSOPT) 22 3-6 8 MG/ML ophthalmic solution 1 drop  1 drop Both Eyes Q12H DANII    insulin lispro (HumaLOG) 100 units/mL subcutaneous injection 1-5 Units  1-5 Units Subcutaneous TID AC    latanoprost (XALATAN) 0 005 % ophthalmic solution 1 drop  1 drop Both Eyes HS    metoprolol tartrate (LOPRESSOR) tablet 100 mg  100 mg Oral Q12H [address]    mycophenolate (CELLCEPT) capsule 1,000 mg  1,000 mg Oral Q12H [address]    ondansetron (ZOFRAN) injection 4 mg  4 mg Intravenous Q6H PRN    pravastatin (PRAVACHOL) tablet 40 mg  40 mg Oral Daily With Dinner    predniSONE tablet 15 mg  15 mg Oral Daily    and PTA meds:   Prior to Admission Medications   Prescriptions Last Dose Informant Patient Reported? Taking? ASPIRIN 81 PO 6/25/2020 at Unknown time  Yes Yes   Sig: Take 81 mg by mouth   Latanoprost 0 005 % EMUL 6/24/2020 at Unknown time  Yes Yes   Sig: Apply 1 drop to eye   apixaban (ELIQUIS) 5 mg 6/25/2020 at Unknown time  No Yes   Sig: Take 2 tablets (10 mg total) by mouth 2 (two) times a day for 7 days, THEN 1 tablet (5 mg total) 2 (two) times a day for 23 days  brimonidine-timolol (COMBIGAN) 0 2-0 5 % 6/24/2020 at Unknown time  Yes Yes   Sig: Apply 1 drop to eye every 12 (twelve) hours   diltiazem (CARDIZEM CD) 120 mg 24 hr capsule 6/25/2020 at Unknown time  No Yes   Sig: Take 1 capsule (120 mg total) by mouth daily   furosemide (LASIX) 20 mg tablet 6/24/2020 at Unknown time  No Yes   Sig: Take 1 tablet (20 mg total) by mouth daily   hydrOXYzine HCL (ATARAX) 10 mg tablet More than a month at Unknown time  Yes No   Sig: TAKE ONE TABLET BY MOUTH THREE TIMES A DAY AS NEEDED FOR ITCHING   metFORMIN (GLUCOPHAGE) 500 mg tablet 6/25/2020 at Unknown time  Yes Yes   Sig: Take 500 mg by mouth   metoprolol tartrate (LOPRESSOR) 100 mg tablet 6/25/2020 at Unknown time  No Yes   Sig: Take 1 tablet (100 mg total) by mouth every 12 (twelve) hours   mupirocin (BACTROBAN) 2 % ointment More than a month at Unknown time  Yes No   Sig: APPLY TWICE DAILY TO SURGICAL SITE FOR 2 WEEKS   mycophenolate (CELLCEPT) 500 mg tablet 6/25/2020 at Unknown time  No Yes   Sig: TAKE ONE TABLET BY MOUTH TWICE A DAY FOR 1 WEEK   THEN INCREASE TO 2 TABLETS TWICE DAILY   predniSONE 10 mg tablet 6/25/2020 at Unknown time  No Yes Sig: 10 mg in AM   simvastatin (ZOCOR) 20 mg tablet 6/24/2020 at Unknown time  Yes Yes   Sig: TAKE ONE-HALF TABLET BY MOUTH EVERY EVENING   triamcinolone (KENALOG) 0 1 % ointment 6/25/2020 at Unknown time  No Yes   Sig: Apply topically twice a day to affected areas      Facility-Administered Medications: None          Allergies   Allergen Reactions    Losartan     Sulfa Antibiotics        Objective   Vitals: Blood pressure 129/76, pulse 62, temperature (!) 97 3 °F (36 3 °C), resp  rate 18, height 5' 4" (1 626 m), weight 62 3 kg (137 lb 5 6 oz), SpO2 100 %  ,     Body mass index is 23 58 kg/m²  ,     Systolic (95YLX), KOR:278 , Min:91 , AGD:171     Diastolic (41XCQ), ENV:07, Min:59, Max:87    Wt Readings from Last 3 Encounters:   06/26/20 62 3 kg (137 lb 5 6 oz)   06/06/20 61 9 kg (136 lb 7 4 oz)   02/05/20 60 4 kg (133 lb 3 2 oz)      Lab Results   Component Value Date    CREATININE 1 02 06/26/2020    CREATININE 1 25 06/25/2020    CREATININE 0 82 06/06/2020             Intake/Output Summary (Last 24 hours) at 6/26/2020 1323  Last data filed at 6/26/2020 0830  Gross per 24 hour   Intake 180 ml   Output 1050 ml   Net -870 ml     Weight (last 2 days)     Date/Time   Weight    06/26/20 0632   62 3 (137 35)    06/26/20 0438   62 3 (137 35)            Invasive Devices     Peripheral Intravenous Line            Peripheral IV 06/25/20 Left Wrist less than 1 day                  Physical Exam   Constitutional: She is oriented to person, place, and time  She appears well-developed and well-nourished  No distress  On RA in NAD   HENT:   Head: Normocephalic and atraumatic  Neck: Normal range of motion  Neck supple  No JVD present  Cardiovascular: Normal rate, normal heart sounds and intact distal pulses  No murmur heard  Irregular, irregular rhythm   Pulmonary/Chest: Effort normal and breath sounds normal  No respiratory distress  She has no wheezes  She has no rales  Abdominal: Soft   Bowel sounds are normal  She exhibits no distension  There is no tenderness  Musculoskeletal: Normal range of motion  She exhibits no edema  Neurological: She is alert and oriented to person, place, and time  Skin: Skin is warm and dry  Psychiatric: She has a normal mood and affect  Her behavior is normal    Nursing note and vitals reviewed          LABORATORY RESULTS:  Results from last 7 days   Lab Units 06/26/20  0432 06/26/20  0210 06/25/20  2259   TROPONIN I ng/mL 0 05* 0 05* 0 05*     CBC with diff:   Results from last 7 days   Lab Units 06/26/20 0437 06/25/20 1740 06/25/20  1739   WBC Thousand/uL 7 27 10 77*  --    HEMOGLOBIN g/dL 12 1 13 7  --    I STAT HEMOGLOBIN g/dl  --   --  13 9   HEMATOCRIT % 36 8 42 0  --    HEMATOCRIT, ISTAT %  --   --  41   MCV fL 97 98  --    PLATELETS Thousands/uL 187 263  --    MCH pg 31 9 32 1  --    MCHC g/dL 32 9 32 6  --    RDW % 14 6 14 6  --    MPV fL 10 2 10 1  --    NRBC AUTO /100 WBCs 0 0  --        CMP:  Results from last 7 days   Lab Units 06/26/20 0432 06/25/20 1740 06/25/20  1739   POTASSIUM mmol/L 3 4* 3 9  --    CHLORIDE mmol/L 101 97*  --    CO2 mmol/L 28 21  --    CO2, I-STAT mmol/L  --   --  24   BUN mg/dL 20 22  --    CREATININE mg/dL 1 02 1 25  --    GLUCOSE, ISTAT mg/dl  --   --  279*   CALCIUM mg/dL 8 4 9 2  --    AST U/L  --  82*  --    ALT U/L  --  81*  --    ALK PHOS U/L  --  65  --    EGFR ml/min/1 73sq m 51 40  --        BMP:  Results from last 7 days   Lab Units 06/26/20 0432 06/25/20 1740 06/25/20  1739   POTASSIUM mmol/L 3 4* 3 9  --    CHLORIDE mmol/L 101 97*  --    CO2 mmol/L 28 21  --    CO2, I-STAT mmol/L  --   --  24   BUN mg/dL 20 22  --    CREATININE mg/dL 1 02 1 25  --    GLUCOSE, ISTAT mg/dl  --   --  279*   CALCIUM mg/dL 8 4 9 2  --           Lab Results   Component Value Date    [facility] 3,333 (H) 06/02/2020            Results from last 7 days   Lab Units 06/26/20  0432   MAGNESIUM mg/dL 2 1          Results from last 7 days   Lab Units 06/25/20  7250 HEMOGLOBIN A1C % 7 9*          Results from last 7 days   Lab Units 20  0432   TSH 3RD GENERATON uIU/mL 0 628       Results from last 7 days   Lab Units 20  1740   INR  1 12     Lipid Profile:   Lab Results   Component Value Date    CHOL 188 2014     Lab Results   Component Value Date    HDL 71 2014     Lab Results   Component Value Date    LDLCALC 89 2014     Lab Results   Component Value Date    TRIG 141 2014         Cardiac testing:   Results for orders placed during the hospital encounter of 20   Echo complete with contrast if indicated    Narrative Leanne [address]  [address]  [phone]    Transthoracic Echocardiogram  2D, M-mode, Doppler, and Color Doppler    Study date:  2020    Patient: Timoteo Sánchez  MR number: [id number]  Account number: [de-identified]  : [id number]  Age: 81 years  Gender: Female  Status: Inpatient  Location: Emergency room  Height: 64 in  Weight: 133 lb  BP: 107/ 80 mmHg    Indications: Heart Failure    Diagnoses: I50 9 - Heart failure, unspecified    Sonographer:  CARLOS EDUARDO Portillo  Primary Physician:  Brittney Degroot [address] [facility] ,   Referring Physician:  Jarad Dunham DO  Group:  [id number]  Interpreting Physician:  Emily Aguilar MD    SUMMARY    LEFT VENTRICLE:  Systolic function was at the lower limits of normal  Ejection fraction was estimated to be 50 %  There were no regional wall motion abnormalities  RIGHT VENTRICLE:  The size was normal   Systolic function was mildly reduced  LEFT ATRIUM:  The atrium was mildly to moderately dilated  ATRIAL SEPTUM:  There was a secundum septal defect  Doppler evaluation was performed  There was a left-to-right shunt  RIGHT ATRIUM:  The atrium was mildly dilated  MITRAL VALVE:  There was mild regurgitation  AORTIC VALVE:  There was mild regurgitation      TRICUSPID VALVE:  There was mild regurgitation  HISTORY: PRIOR HISTORY: HOCM, HTN, HLD, DM, Carotid stenosis, CAD, Stents    PROCEDURE: The procedure was performed in the emergency room  This was a routine study  The transthoracic approach was used  The study included complete 2D imaging, M-mode, complete spectral Doppler, and color Doppler  The heart rate was  116 bpm, at the start of the study  Images were obtained from the parasternal, apical, subcostal, and suprasternal notch acoustic windows  Intravenous contrast ( 1 mL Definity in NSS) was administered  Echocardiographic views were limited  due to poor acoustic window availability  This was a technically difficult study  LEFT VENTRICLE: Size was normal  Systolic function was at the lower limits of normal  Ejection fraction was estimated to be 50 %  There were no regional wall motion abnormalities  Wall thickness was normal  No evidence of apical thrombus  DOPPLER: Transmitral flow pattern: atrial fibrillation  RIGHT VENTRICLE: The size was normal  Systolic function was mildly reduced  LEFT ATRIUM: The atrium was mildly to moderately dilated  ATRIAL SEPTUM: There was a secundum septal defect  Doppler evaluation was performed  There was a left-to-right shunt  RIGHT ATRIUM: The atrium was mildly dilated  MITRAL VALVE: Valve structure was normal  There was normal leaflet separation  DOPPLER: The transmitral velocity was within the normal range  There was no evidence for stenosis  There was mild regurgitation  AORTIC VALVE: The valve was trileaflet  Leaflets exhibited mildly increased thickness and normal cuspal separation  DOPPLER: Transaortic velocity was within the normal range  There was no evidence for stenosis  There was mild  regurgitation  TRICUSPID VALVE: The valve structure was normal  There was normal leaflet separation  DOPPLER: The transtricuspid velocity was within the normal range  There was no evidence for stenosis  There was mild regurgitation   The tricuspid jet  envelope definition was inadequate for estimation of RV systolic pressure  PULMONIC VALVE: Leaflets exhibited normal thickness, no calcification, and normal cuspal separation  DOPPLER: The transpulmonic velocity was within the normal range  There was mild regurgitation  PERICARDIUM: There was no pericardial effusion  AORTA: The root exhibited normal size  SYSTEMIC VEINS: IVC: The inferior vena cava was not well visualized  SYSTEM MEASUREMENT TABLES    2D mode  AV Diam: 3 1 cm  AoR Diam; Mean (2D): 3 1 cm  Area; 2D mode;: 2530 mm2  Area; 2D mode;: 1890 mm2  Area; Mean; Mean value chosen; 2D mode;: 2210 mm2  LA Diam (2D): 3 6 cm  LA Dimension; Mean (2D): 3 6 cm  LA/Ao (2D): 1 16  EDV (2D-Cubed): 104 cm3  EF (2D-Cubed): 28 8 %  ESV (2D-Cubed): 74 1 cm3  FS (2D-Cubed): 10 6 %  FS (2D-Teich): 10 6 %  IVS/LVPW (2D): 1 09  IVSd (2D): 1 2 cm  IVSd; Mean chosen (2D): 1 2 cm  LVIDd (2D): 4 7 cm  LVIDd; Mean (2D): 4 7 cm  LVIDs (2D): 4 2 cm  LVIDs; Mean (2D): 4 2 cm  LVPWd (2D): 1 1 cm  LVPWd; Mean (2D): 1 1 cm  Left Ventricular Ejection Fraction; Teichholz; 2D mode;: 22 9 %  Left Ventricular End Diastolic Volume; Teichholz; 2D mode;: 102 cm3  Left Ventricular End Systolic Volume; Teichholz; 2D mode;: 78 6 cm3  SV (2D-Cubed): 29 9 cm3  Stroke Volume; Teichholz; 2D mode;: 23 4 cm3  RVIDd (2D): 2 5 cm  RVIDd; Mean (2D): 2 5 cm  Right and Left Ventricular End Diastolic Diameter Ratio; 2D mode;: 0 53    Apical four chamber  Right Atrium Systolic Area; End Systole; 2D mode; Apical four chamber;: 2190 mm2  Right Atrium Systolic Area; End Systole; 2D mode; Apical four chamber;: 1810 mm2  Right Atrium Systolic Area; Mean; Mean value chosen; End Systole; 2D mode;  Apical four chamber;: 2000 mm2    M mode  Tricuspid Annular Plane Systolic Excursion; Mean; Mean value chosen; Tricuspid Annulus; M mode;: 1 89 cm  Tricuspid Annular Plane Systolic Excursion; Tricuspid Annulus; M mode;: 1 89 cm    Unspecified Scan Mode  Peak Grad; Mean; Regurgitant Flow: 23 mm[Hg]  Vmax; Mean; Regurgitant Flow: 2420 mm/s  Vmax; Regurgitant Flow: 2420 mm/s    [facility] Accredited Echocardiography Laboratory    Prepared and electronically signed by    Mary Smyth MD  Signed 03-Jun-2020 15:17:39       No results found for this or any previous visit  No procedure found  No results found for this or any previous visit  Imaging: I have personally reviewed pertinent reports  Xr Chest 1 View Portable    Result Date: 6/2/2020  Narrative: CHEST INDICATION:   sob  COMPARISON:  None EXAM PERFORMED/VIEWS:  XR CHEST PORTABLE FINDINGS: Cardiomediastinal silhouette appears unremarkable  The lungs are clear  Tiny benign calcified granuloma in the right upper lung  No pneumothorax or pleural effusion  Osseous structures appear within normal limits for patient age  Impression: No acute cardiopulmonary disease  Workstation performed: [id number]     Ct Head Without Contrast    Result Date: 6/25/2020  Narrative: CT BRAIN - WITHOUT CONTRAST INDICATION:   trauma  COMPARISON:  None  TECHNIQUE:  CT examination of the brain was performed  In addition to axial images, coronal 2D reformatted images were created and submitted for interpretation  Radiation dose length product (DLP) for this visit:  857 42 mGy-cm   This examination, like all CT scans performed in the [facility], was performed utilizing techniques to minimize radiation dose exposure, including the use of iterative  reconstruction and automated exposure control  IMAGE QUALITY:  Diagnostic  FINDINGS: PARENCHYMA:  No intracranial mass, mass effect or midline shift  No CT signs of acute infarction  No acute parenchymal hemorrhage  Atherosclerotic calcification of the vessels at the skull base are noted  Possible small bilateral chronic basal ganglia  lacunar infarct versus dilated perivascular spaces   VENTRICLES AND EXTRA-AXIAL SPACES:  Normal for the patient's age  Probable tiny 7 mm right paramedian posterior parietal calcified meningioma without appreciable mass effect with questionable small calcified meningioma the right sphenoid wing extending into the anterior aspect of the middle cranial fossa  No appreciable mass effect  VISUALIZED ORBITS AND PARANASAL SINUSES:  Bilateral ocular lens implants are present  Visualized paranasal sinuses are clear  CALVARIUM AND EXTRACRANIAL SOFT TISSUES:  Small right posterior parietal scalp contusion with laceration with a small amount subcutaneous air is identified  No underlying fracture noted  Impression: 1  Small right paramedian posterior parietal scalp contusion /laceration without evidence for fracture or acute intracranial abnormality  I personally discussed this study with Bryn Borja on 6/25/2020 at 6:05 PM  Workstation performed: [id number]     Ct Cervical Spine Without Contrast    Result Date: 6/25/2020  Narrative: CT CERVICAL SPINE - WITHOUT CONTRAST INDICATION:   trauma  COMPARISON:  None  TECHNIQUE:  CT examination of the cervical spine was performed without intravenous contrast   Contiguous axial images were obtained  Sagittal and coronal reconstructions were performed  Radiation dose length product (DLP) for this visit:  321 85 mGy-cm   This examination, like all CT scans performed in the [facility], was performed utilizing techniques to minimize radiation dose exposure, including the use of iterative  reconstruction and automated exposure control  IMAGE QUALITY:  Diagnostic  FINDINGS: ALIGNMENT:  Normal alignment of the cervical spine  No subluxation  VERTEBRAL BODIES:  No fracture  DEGENERATIVE CHANGES:  No significant cervical degenerative changes are noted  PREVERTEBRAL AND PARASPINAL SOFT TISSUES:  Bilateral cervical carotid artery calcification present with left carotid artery stent   THORACIC INLET:  Normal      Impression: No cervical spine fracture or traumatic malalignment  I personally discussed this study with Bryn Borja on 6/25/2020 at 6:10 PM   Workstation performed: [id number]     Xr Chest 1 View    Result Date: 6/26/2020  Narrative: TRAUMA SERIES INDICATION:  trauma  COMPARISON:  None VIEWS:  XR TRAUMA MULTIPLE  FINDINGS: CHEST: Supine frontal view of the chest is obtained  Cardiomegaly is noted  Lungs are clear  No layering pleural effusions detected  No pneumothorax is seen on this supine film  Upright images are more sensitive to detect anterior pneumothoraces if relevant  No displaced fractures  Impression: No acute cardiopulmonary disease within limitations of supine imaging  Workstation performed: [id number]     Xr Trauma Multiple    Result Date: 6/25/2020  Narrative: TRAUMA SERIES INDICATION:  trauma  COMPARISON:  None VIEWS:  XR TRAUMA MULTIPLE  FINDINGS: CHEST: Supine frontal view of the chest is obtained  Cardiomegaly is noted  Lungs are clear  No layering pleural effusions detected  No pneumothorax is seen on this supine film  Upright images are more sensitive to detect anterior pneumothoraces if relevant  No displaced fractures  Impression: No acute cardiopulmonary disease within limitations of supine imaging  Workstation performed: [id number]     Cta Ed Chest Pe Study    Result Date: 6/2/2020  Narrative: CTA - CHEST WITH IV CONTRAST - PULMONARY ANGIOGRAM INDICATION:   sob, moise, elevated D Dimer  COMPARISON:  Chest radiograph performed earlier today TECHNIQUE: CTA examination of the chest was performed using angiographic technique according to a protocol specifically tailored to evaluate for pulmonary embolism  Axial, sagittal, and coronal 2D reformatted images were created from the source data and  submitted for interpretation  In addition, coronal 3D MIP postprocessing was performed on the acquisition scanner  Radiation dose length product (DLP) for this visit:  484 22 mGy-cm     This examination, like all CT scans performed in the [facility], was performed utilizing techniques to minimize radiation dose exposure, including the use of iterative  reconstruction and automated exposure control  IV Contrast:  100 mL of iohexol (OMNIPAQUE)  FINDINGS: PULMONARY ARTERIAL TREE:  There are filling defects in a right upper lobe segmental artery (2/68) in multiple right lower lobe subsegmental arteries (2/127, 2/140, 2/144 in 2/148)  No definite filling defects in the left pulmonary arterial system  LUNGS:  Subsegmental atelectasis in the right lower lobe  Scattered bilateral punctate calcified granulomas  No solid nodule or mass  There is no tracheal or endobronchial lesion  PLEURA:  Small right pleural effusion  No pneumothorax  HEART/GREAT VESSELS:  Calcified aortic and coronary artery atherosclerosis  Mild cardiomegaly  Left ventricular hypertrophy and right and left atrial dilation  Measured RV/LV ratio is within normal limits at less than 0 9  MEDIASTINUM AND AUGUSTIN:  Unremarkable  CHEST WALL AND LOWER NECK:   Unremarkable  VISUALIZED STRUCTURES IN THE UPPER ABDOMEN:  Unremarkable  OSSEOUS STRUCTURES:  No acute fracture or destructive osseous lesion  Impression: 1  Multiple right-sided segmental and subsegmental pulmonary emboli  Measured RV/LV ratio is within normal limits at less than 0 9  2   Small right pleural effusion  I personally discussed this study with Tal Sesay on 6/2/2020 at 5:29 PM  Workstation performed: [id number]     Vas Lower Limb Venous Duplex Study, Complete Bilateral    Result Date: 6/6/2020  Narrative:  [facility] REPORT CLINICAL: Indications: Patient presents with recent discovery of pulmonary embolism and physician wants to determine potential source  Patient denies any leg pain  Operative History: No cardiovascular surgeries Risk Factors The patient has history of HTN, Diabetes, HLD and PAD    FINDINGS:  Segment  Right            Left              Impression       Impression CFV      Normal (Patent)  Normal (Patent)     CONCLUSION:  Impression: RIGHT LOWER LIMB: No evidence of acute or chronic deep vein thrombosis  No evidence of superficial thrombophlebitis noted  Doppler evaluation shows a normal response to augmentation maneuvers  Popliteal, posterior tibial and anterior tibial arterial Doppler waveforms are biphasic  LEFT LOWER LIMB: No evidence of acute or chronic deep vein thrombosis  No evidence of superficial thrombophlebitis noted  Doppler evaluation shows a normal response to augmentation maneuvers  Popliteal, posterior tibial and anterior tibial arterial Doppler waveforms are biphasic  Technical findings were faxed to chart  SIGNATURE: Electronically Signed by: Talha Lopez on 2020-06-06 12:54:42 PM    Counseling / Coordination of Care  Total floor / unit time spent today 45 minutes  Greater than 50% of total time was spent with the patient and / or family counseling and / or coordination of care  A description of the counseling / coordination of care: Review of history, current assessment, development of a plan  Code Status: Level 1 - Full Code    ** Please Note: Dragon 360 Dictation voice to text software may have been used in the creation of this document   **

## 2020-06-26 NOTE — PROGRESS NOTES
Progress Note - Bettejane Barthel 1935, 81 y o  female MRN: [id number]    Unit/Bed#: [facility] 802-01 Encounter: [id number]    Primary Care Provider: Karen Tucker DO   Date and time admitted to hospital: 6/25/2020  5:34 PM      * Syncope and collapse  Assessment & Plan  - s/p syncopal episode on 6/25   - no traumatic injuries   - ice and tylenol for scalp contusion   - continue medical management per primary team   - trauma will sign off    - please do not hesitate to call the trauma team with any questions or concerns         Prophylaxis: Sequential compression device (Venodyne)  and Reason for no pharmacologic prophylaxis on eliquis    Disposition: inpatient, admitted to medicine primary     Code status:  Level 1 - Full Code    Consultants:   - none    Is the patient 72 years or older?: YES:    1  Before the illness or injury that brought you to the Emergency, did you need someone to help you on a regular basis? 0=No   2  Since the illness or injury that brought you to the Emergency, have you needed more help than usual to take care of yourself? 0=No   3  Have you been hospitalized for one or more nights during the past 6 months (excluding a stay in the Emergency Department)? 1=Yes   4  In general, do you see well? 0=Yes   5  In general, do you have serious problems with your memory? 0=No   6  Do you take more than three different medications everyday? 1=Yes   TOTAL   2     Did you order a geriatric consult if the score was 2 or greater?: no  - would recommend ordering a geriatrics consult given iSAR score of 2, but will leave decision to primary  Identification of Seniors at Risk      Most Recent Value   (ISAR) Identification of Seniors at Risk   Before the illness or injury that brought you to the Emergency, did you need someone to help you on a regular basis?   0 Filed at: 06/25/2020 1741   In the last 24 hours, have you needed more help than usual?  0 Filed at: 06/25/2020 1741   Have you been hospitalized for one or more nights during the past 6 months? 1 Filed at: 06/25/2020 1741   In general, do you see well? 1 Filed at: 06/25/2020 1741   In general, do you have serious problems with your memory? 0 Filed at: 06/25/2020 1741   Do you take more than three different medications every day? 1 Filed at: 06/25/2020 1741   ISAR Score  3 Filed at: 06/25/2020 1741            SUBJECTIVE:   Tertiary Exam Due on: today, 6/26    Mechanism of Injury:Fall    Details related to Injury: +LOC:  yes    Chief Complaint: "I feel great today"     HPI/Last 24 hour events:  Patient was seen and examined this morning on rounds  No acute events overnight  Patient states that she was able to get some sleep overnight  Patient denies any pain this morning  Patient denies any headaches, dizziness, chest pain, shortness of breath, nausea, vomiting, leg pain, weakness  No other traumatic injuries identified on pressure exam today  Trauma will sign off, continue medical management per primary team   Please do not hesitate to call the trauma team with any questions or concerns      Active medications:           Current Facility-Administered Medications:     acetaminophen (TYLENOL) tablet 650 mg, 650 mg, Oral, Q6H PRN    aluminum-magnesium hydroxide-simethicone (MYLANTA) 200-200-20 mg/5 mL oral suspension 30 mL, 30 mL, Oral, Q6H PRN    apixaban (ELIQUIS) tablet 5 mg, 5 mg, Oral, BID, 5 mg at 06/26/20 1704    aspirin chewable tablet 81 mg, 81 mg, Oral, Daily, 81 mg at 06/26/20 0807    diltiazem (CARDIZEM CD) 24 hr capsule 120 mg, 120 mg, Oral, Daily, 120 mg at 06/26/20 0807    docusate sodium (COLACE) capsule 100 mg, 100 mg, Oral, BID PRN    dorzolamide-timolol (COSOPT) 22 3-6 8 MG/ML ophthalmic solution 1 drop, 1 drop, Both Eyes, Q12H DANII, 1 drop at 06/26/20 0808    insulin lispro (HumaLOG) 100 units/mL subcutaneous injection 1-5 Units, 1-5 Units, Subcutaneous, TID AC, 1 Units at 06/26/20 1705 **AND** Fingerstick Glucose (POCT), , , TID AC    latanoprost (XALATAN) 0 005 % ophthalmic solution 1 drop, 1 drop, Both Eyes, HS, 1 drop at 06/25/20 2314    metoprolol tartrate (LOPRESSOR) tablet 100 mg, 100 mg, Oral, Q12H DANII, 100 mg at 06/26/20 0807    mycophenolate (CELLCEPT) capsule 1,000 mg, 1,000 mg, Oral, Q12H DANII, 1,000 mg at 06/26/20 0808    ondansetron (ZOFRAN) injection 4 mg, 4 mg, Intravenous, Q6H PRN    pravastatin (PRAVACHOL) tablet 40 mg, 40 mg, Oral, Daily With Dinner, 40 mg at 06/26/20 1704    predniSONE tablet 15 mg, 15 mg, Oral, Daily, 15 mg at 06/26/20 0808      OBJECTIVE:     Vitals:   Vitals:    06/26/20 1633   BP: 125/84   Pulse: 94   Resp:    Temp:    SpO2: 97%       Physical Exam:   GENERAL APPEARANCE:  Well-appearing elderly female resting comfortably in bed  NEURO:  GCS 15, alert oriented x3, following commands  No focal neuro deficits appreciated  HEENT:  Normocephalic, small abrasion noted to right side of scalp  No active bleeding  CV:  Irregularly irregular rhythm, regular rate  LUNGS:  CTA bilaterally  GI:  Abdomen soft, nontender, nondistended  :  Voiding  MSK:  Moves all extremities without difficulty  No C/T/L-spine tenderness  SKIN:  See above, otherwise clean dry and intact  I/O:   I/O       06/24 0701 - 06/25 0700 06/25 0701 - 06/26 0700 06/26 0701 - 06/27 0700    P  O    360    Total Intake(mL/kg)   360 (5 8)    Urine (mL/kg/hr)  650 750 (1 2)    Total Output  650 750    Net  -650 -390                 Invasive Devices: Invasive Devices     Peripheral Intravenous Line            Peripheral IV 06/25/20 Left Wrist less than 1 day                  Imaging:   Ct Head Without Contrast    Result Date: 6/25/2020  Impression: 1  Small right paramedian posterior parietal scalp contusion /laceration without evidence for fracture or acute intracranial abnormality    I personally discussed this study with Tania Strickland on 6/25/2020 at 6:05 PM  Workstation performed: [id number]     Ct Cervical Spine Without Contrast    Result Date: 6/25/2020  Impression: No cervical spine fracture or traumatic malalignment  I personally discussed this study with Marily Holley on 6/25/2020 at 6:10 PM   Workstation performed: [id number]     Xr Chest 1 View    Result Date: 6/26/2020  Impression: No acute cardiopulmonary disease within limitations of supine imaging  Workstation performed: [id number]     Xr Trauma Multiple    Result Date: 6/25/2020  Impression: No acute cardiopulmonary disease within limitations of supine imaging   Workstation performed: [id number]       Labs:   CBC:   Lab Results   Component Value Date    WBC 7 27 06/26/2020    HGB 12 1 06/26/2020    HCT 36 8 06/26/2020    MCV 97 06/26/2020     06/26/2020    MCH 31 9 06/26/2020    MCHC 32 9 06/26/2020    RDW 14 6 06/26/2020    MPV 10 2 06/26/2020    NRBC 0 06/26/2020     CMP:   Lab Results   Component Value Date     06/26/2020    CO2 28 06/26/2020    CO2 24 06/25/2020    BUN 20 06/26/2020    CREATININE 1 02 06/26/2020    GLUCOSE 279 (H) 06/25/2020    CALCIUM 8 4 06/26/2020    AST 82 (H) 06/25/2020    ALT 81 (H) 06/25/2020    ALKPHOS 65 06/25/2020    EGFR 51 06/26/2020

## 2020-06-26 NOTE — SOCIAL WORK
CM confirmed information below from note completed on 6/04/20:    CM met pt at bedside, introduce self and made aware of CM role at dc  Pt has a LW and POA  POROYCE are her dtr Caroline Nguyen- [phone] and dtr Wilfredo- lives in [facility]      Pt lives alone on a 2 story house with 2 TOBIAS and 13 steps to the basement washer and dryer  Pt's daughter comes on Sundays to help around the house  Pt was [employer] with [employer], drive and retired  Pt also takes public transportation when going to some appointments pre-covid, but now has family take her to appointments  Pt dtr Caroline Nguyen lives locally and can help pt if needed  Pt deneis hx with HHc, STR, alc, drug and IP psych tx  PCP is Dr Tejinder Lee at [facility] ([place], [place]) PCP (next door to [facility])  Pharmacy is [facility] in [place]  Pt has transportation when dc      CM reviewed d/c planning process including the following: identifying help at home, patient preference for d/c planning needs, Discharge Lounge, [facility] Meds to Bed program, availability of treatment team to discuss questions or concerns patient and/or family may have regarding understanding medications and recognizing signs and symptoms once discharged  CM also encouraged patient to follow up with all recommended appointments after discharge  Patient advised of importance for patient and family to participate in managing patients medical well being

## 2020-06-26 NOTE — ASSESSMENT & PLAN NOTE
Acute  pulmonary embolism diagnosed on 6/2, CT " Multiple right-sided segmental and subsegmental pulmonary emboli," treated with Eliquis 5mg BID

## 2020-06-26 NOTE — PLAN OF CARE
Problem: Potential for Falls  Goal: Patient will remain free of falls  Description  INTERVENTIONS:  - Assess patient frequently for physical needs  -  Identify cognitive and physical deficits and behaviors that affect risk of falls    -  [place] fall precautions as indicated by assessment   - Educate patient/family on patient safety including physical limitations  - Instruct patient to call for assistance with activity based on assessment  - Modify environment to reduce risk of injury  - Consider OT/PT consult to assist with strengthening/mobility  Outcome: Progressing     Problem: PAIN - ADULT  Goal: Verbalizes/displays adequate comfort level or baseline comfort level  Description  Interventions:  - Encourage patient to monitor pain and request assistance  - Assess pain using appropriate pain scale  - Administer analgesics based on type and severity of pain and evaluate response  - Implement non-pharmacological measures as appropriate and evaluate response  - Consider cultural and social influences on pain and pain management  - Notify physician/advanced practitioner if interventions unsuccessful or patient reports new pain  Outcome: Progressing     Problem: INFECTION - ADULT  Goal: Absence or prevention of progression during hospitalization  Description  INTERVENTIONS:  - Assess and monitor for signs and symptoms of infection  - Monitor lab/diagnostic results  - Monitor all insertion sites, i e  indwelling lines, tubes, and drains  - Monitor endotracheal if appropriate and nasal secretions for changes in amount and color  - [place] appropriate cooling/warming therapies per order  - Administer medications as ordered  - Instruct and encourage patient and family to use good hand hygiene technique  - Identify and instruct in appropriate isolation precautions for identified infection/condition  Outcome: Progressing  Goal: Absence of fever/infection during neutropenic period  Description  INTERVENTIONS:  - Monitor WBC    Outcome: Progressing     Problem: SAFETY ADULT  Goal: Maintain or return to baseline ADL function  Description  INTERVENTIONS:  -  Assess patient's ability to carry out ADLs; assess patient's baseline for ADL function and identify physical deficits which impact ability to perform ADLs (bathing, care of mouth/teeth, toileting, grooming, dressing, etc )  - Assess/evaluate cause of self-care deficits   - Assess range of motion  - Assess patient's mobility; develop plan if impaired  - Assess patient's need for assistive devices and provide as appropriate  - Encourage maximum independence but intervene and supervise when necessary  - Involve family in performance of ADLs  - Assess for home care needs following discharge   - Consider OT consult to assist with ADL evaluation and planning for discharge  - Provide patient education as appropriate  Outcome: Progressing  Goal: Maintain or return mobility status to optimal level  Description  INTERVENTIONS:  - Assess patient's baseline mobility status (ambulation, transfers, stairs, etc )    - Identify cognitive and physical deficits and behaviors that affect mobility  - Identify mobility aids required to assist with transfers and/or ambulation (gait belt, sit-to-stand, lift, walker, cane, etc )  - [place] fall precautions as indicated by assessment  - Record patient progress and toleration of activity level on Mobility SBAR; progress patient to next Phase/Stage  - Instruct patient to call for assistance with activity based on assessment  - Consider rehabilitation consult to assist with strengthening/weightbearing, etc   Outcome: Progressing     Problem: DISCHARGE PLANNING  Goal: Discharge to home or other facility with appropriate resources  Description  INTERVENTIONS:  - Identify barriers to discharge w/patient and caregiver  - Arrange for needed discharge resources and transportation as appropriate  - Identify discharge learning needs (meds, wound care, etc )  - Arrange for interpretive services to assist at discharge as needed  - Refer to Case Management Department for coordinating discharge planning if the patient needs post-hospital services based on physician/advanced practitioner order or complex needs related to functional status, cognitive ability, or social support system  Outcome: Progressing     Problem: Knowledge Deficit  Goal: Patient/family/caregiver demonstrates understanding of disease process, treatment plan, medications, and discharge instructions  Description  Complete learning assessment and assess knowledge base    Interventions:  - Provide teaching at level of understanding  - Provide teaching via preferred learning methods  Outcome: Progressing

## 2020-06-26 NOTE — ASSESSMENT & PLAN NOTE
Lab Results   Component Value Date    HGBA1C 7 9 (H) 06/25/2020      · hold metformin  · Check blood sugars before meals and before bedtime with appropriate insulin sliding scale

## 2020-06-26 NOTE — ASSESSMENT & PLAN NOTE
Mildly elevated BUN creatinine from previous, on Lasix  Dehydration?    Will put on hold Lasix  Mild hydration  Telemetry  Orthostatics

## 2020-06-26 NOTE — ASSESSMENT & PLAN NOTE
- s/p syncopal episode on 6/25   - no traumatic injuries   - ice and tylenol for scalp contusion   - continue medical management per primary team   - trauma will sign off    - please do not hesitate to call the trauma team with any questions or concerns

## 2020-06-27 VITALS
DIASTOLIC BLOOD PRESSURE: 83 MMHG | OXYGEN SATURATION: 98 % | SYSTOLIC BLOOD PRESSURE: 113 MMHG | HEIGHT: 64 IN | WEIGHT: 139.33 LBS | HEART RATE: 93 BPM | RESPIRATION RATE: 16 BRPM | BODY MASS INDEX: 23.79 KG/M2 | TEMPERATURE: 97.8 F

## 2020-06-27 LAB
ANION GAP SERPL CALCULATED.3IONS-SCNC: 7 MMOL/L (ref 4–13)
BUN SERPL-MCNC: 16 MG/DL (ref 5–25)
CALCIUM SERPL-MCNC: 8.8 MG/DL (ref 8.3–10.1)
CHLORIDE SERPL-SCNC: 104 MMOL/L (ref 100–108)
CO2 SERPL-SCNC: 27 MMOL/L (ref 21–32)
CREAT SERPL-MCNC: 0.8 MG/DL (ref 0.6–1.3)
ERYTHROCYTE [DISTWIDTH] IN BLOOD BY AUTOMATED COUNT: 14.6 % (ref 11.6–15.1)
GFR SERPL CREATININE-BSD FRML MDRD: 68 ML/MIN/1.73SQ M
GLUCOSE SERPL-MCNC: 121 MG/DL (ref 65–140)
GLUCOSE SERPL-MCNC: 151 MG/DL (ref 65–140)
GLUCOSE SERPL-MCNC: 225 MG/DL (ref 65–140)
HCT VFR BLD AUTO: 37.8 % (ref 34.8–46.1)
HGB BLD-MCNC: 12.2 G/DL (ref 11.5–15.4)
MCH RBC QN AUTO: 32.2 PG (ref 26.8–34.3)
MCHC RBC AUTO-ENTMCNC: 32.3 G/DL (ref 31.4–37.4)
MCV RBC AUTO: 100 FL (ref 82–98)
PLATELET # BLD AUTO: 202 THOUSANDS/UL (ref 149–390)
PMV BLD AUTO: 10.7 FL (ref 8.9–12.7)
POTASSIUM SERPL-SCNC: 4.2 MMOL/L (ref 3.5–5.3)
RBC # BLD AUTO: 3.79 MILLION/UL (ref 3.81–5.12)
SODIUM SERPL-SCNC: 138 MMOL/L (ref 136–145)
WBC # BLD AUTO: 7.15 THOUSAND/UL (ref 4.31–10.16)

## 2020-06-27 PROCEDURE — 99239 HOSP IP/OBS DSCHRG MGMT >30: CPT | Performed by: INTERNAL MEDICINE

## 2020-06-27 PROCEDURE — 99232 SBSQ HOSP IP/OBS MODERATE 35: CPT | Performed by: INTERNAL MEDICINE

## 2020-06-27 PROCEDURE — 82948 REAGENT STRIP/BLOOD GLUCOSE: CPT

## 2020-06-27 PROCEDURE — 85027 COMPLETE CBC AUTOMATED: CPT | Performed by: NURSE PRACTITIONER

## 2020-06-27 PROCEDURE — 80048 BASIC METABOLIC PNL TOTAL CA: CPT | Performed by: NURSE PRACTITIONER

## 2020-06-27 RX ADMIN — APIXABAN 5 MG: 5 TABLET, FILM COATED ORAL at 08:00

## 2020-06-27 RX ADMIN — DORZOLAMIDE HYDROCHLORIDE AND TIMOLOL MALEATE 1 DROP: 20; 5 SOLUTION/ DROPS OPHTHALMIC at 07:58

## 2020-06-27 RX ADMIN — DILTIAZEM HYDROCHLORIDE 120 MG: 120 CAPSULE, EXTENDED RELEASE ORAL at 08:01

## 2020-06-27 RX ADMIN — MYCOPHENOLATE MOFETIL 1000 MG: 250 CAPSULE ORAL at 08:00

## 2020-06-27 RX ADMIN — ASPIRIN 81 MG 81 MG: 81 TABLET ORAL at 08:01

## 2020-06-27 RX ADMIN — PREDNISONE 15 MG: 5 TABLET ORAL at 08:00

## 2020-06-27 RX ADMIN — METOPROLOL TARTRATE 100 MG: 50 TABLET, FILM COATED ORAL at 08:01

## 2020-06-27 NOTE — PROGRESS NOTES
Cardiology Progress Note - Freya Johnson 81 y o  female MRN: [id number]    Unit/Bed#: [facility] 802-01 Encounter: [id number]      Assessment:  Principal Problem:    Syncope and collapse  Active Problems:    Apical variant hypertrophic cardiomyopathy ([address] )    Essential hypertension    Hyperlipidemia    Multiple subsegmental pulmonary emboli without acute cor pulmonale (HCC)    Bullous pemphigoid    Type 2 diabetes mellitus without complication, without long-term current use of insulin (HCC)    Hypokalemia      Plan:  Patient is comfortable today  She has no chest pain or significant dyspnea  She is in atrial fibrillation with a controlled ventricular response  She has had no recurrent episodes  Prior echocardiogram demonstrated a left ventricular ejection fraction of 50% with biatrial cavity enlargement and no significant valvular heart disease  An atrial septal defect with left-to-right flow was noted at that time  BMP today with potassium of 4 2 and creatinine of 0 8  Patient is stable for discharge planning from my point of view  Would discharge off diuretic  Have encouraged the patient to be cautious with activity in warm weather and stay well hydrated  Patient will follow up with her primary cardiologist as an outpatient  Subjective:   Patient seen and examined  No significant events overnight   negative  Objective:     Vitals: Blood pressure 113/83, pulse 93, temperature 97 8 °F (36 6 °C), resp  rate 16, height 5' 4" (1 626 m), weight 63 2 kg (139 lb 5 3 oz), SpO2 98 %  , Body mass index is 23 92 kg/m² ,   Orthostatic Blood Pressures      Most Recent Value   Blood Pressure  113/83 filed at 06/27/2020 1111   Patient Position - Orthostatic VS  Sitting - Orthostatic VS filed at 06/27/2020 1111      ,      Intake/Output Summary (Last 24 hours) at 6/27/2020 1143  Last data filed at 6/27/2020 0900  Gross per 24 hour   Intake 600 ml   Output 1200 ml   Net -600 ml       No significant arrhythmias seen on telemetry review         Physical Exam:    GEN: Slivia Montiel appears well, alert and oriented x 3, pleasant and cooperative   NECK: supple, no carotid bruits, no JVD or HJR  HEART: normal rate, regular rhythm, normal S1 and S2, no murmurs, clicks, gallops or rubs   LUNGS: clear to auscultation bilaterally; no wheezes, rales, or rhonchi   ABDOMEN: normal bowel sounds, soft, no tenderness, no distention  EXTREMITIES: peripheral pulses normal; no clubbing, cyanosis, or edema  SKIN: warm and well perfused, no suspicious lesions on exposed skin    Labs & Results:    Admission on 06/25/2020   Component Date Value    WBC 06/25/2020 10 77*    RBC 06/25/2020 4 27     Hemoglobin 06/25/2020 13 7     Hematocrit 06/25/2020 42 0     MCV 06/25/2020 98     MCH 06/25/2020 32 1     MCHC 06/25/2020 32 6     RDW 06/25/2020 14 6     MPV 06/25/2020 10 1     Platelets 85/14/7478 263     nRBC 06/25/2020 0     Neutrophils Relative 06/25/2020 83*    Immat GRANS % 06/25/2020 2     Lymphocytes Relative 06/25/2020 11*    Monocytes Relative 06/25/2020 4     Eosinophils Relative 06/25/2020 0     Basophils Relative 06/25/2020 0     Neutrophils Absolute 06/25/2020 8 78*    Immature Grans Absolute 06/25/2020 0 26*    Lymphocytes Absolute 06/25/2020 1 21     Monocytes Absolute 06/25/2020 0 47     Eosinophils Absolute 06/25/2020 0 01     Basophils Absolute 06/25/2020 0 04     Sodium 06/25/2020 131*    Potassium 06/25/2020 3 9     Chloride 06/25/2020 97*    CO2 06/25/2020 21     ANION GAP 06/25/2020 13     BUN 06/25/2020 22     Creatinine 06/25/2020 1 25     Glucose 06/25/2020 275*    Calcium 06/25/2020 9 2     AST 06/25/2020 82*    ALT 06/25/2020 81*    Alkaline Phosphatase 06/25/2020 65     Total Protein 06/25/2020 6 0*    Albumin 06/25/2020 3 4*    Total Bilirubin 06/25/2020 0 69     eGFR 06/25/2020 40     Protime 06/25/2020 14 4     INR 06/25/2020 1 12     PTT 06/25/2020 23     Troponin I 06/25/2020 0 06*  ph, [place] ISTAT 06/25/2020 7 427*    pCO2, [place] i-STAT 06/25/2020 35 4*    pO2, [place] i-STAT 06/25/2020 39 0     BE, i-STAT 06/25/2020 -1     HCO3, [place] i-STAT 06/25/2020 23 4*    CO2, i-STAT 06/25/2020 24     O2 Sat, i-STAT 06/25/2020 75     SODIUM, I-STAT 06/25/2020 130*    Potassium, i-STAT 06/25/2020 4 1     Calcium, Ionized i-STAT 06/25/2020 1 18     Hct, i-STAT 06/25/2020 41     Hgb, i-STAT 06/25/2020 13 9     Glucose, i-STAT 06/25/2020 279*    Specimen Type 06/25/2020 VENOUS     Ventricular Rate 06/25/2020 100     Atrial Rate 06/25/2020 441     QRSD Interval 06/25/2020 98     QT Interval 06/25/2020 316     QTC Interval 06/25/2020 407     P Axis 06/25/2020 72     QRS Axis 06/25/2020 35     T Wave Axis 06/25/2020 203     Hemoglobin A1C 06/25/2020 7 9*    EAG 06/25/2020 180     Troponin I 06/25/2020 0 05*    Color, UA 06/26/2020 Yellow     Clarity, UA 06/26/2020 Clear     Specific Gravity, UA 06/26/2020 1 011     pH, UA 06/26/2020 6 5     Leukocytes, UA 06/26/2020 Negative     Nitrite, UA 06/26/2020 Negative     Protein, UA 06/26/2020 Negative     Glucose, UA 06/26/2020 500 (1/2%)*    Ketones, UA 06/26/2020 Negative     Urobilinogen, UA 06/26/2020 0 2     Bilirubin, UA 06/26/2020 Negative     Blood, UA 06/26/2020 Negative     POC Glucose 06/25/2020 226*    Troponin I 06/26/2020 0 05*    Troponin I 06/26/2020 0 05*    TSH 3RD GENERATON 06/26/2020 0 628     Sodium 06/26/2020 138     Potassium 06/26/2020 3 4*    Chloride 06/26/2020 101     CO2 06/26/2020 28     ANION GAP 06/26/2020 9     BUN 06/26/2020 20     Creatinine 06/26/2020 1 02     Glucose 06/26/2020 141*    Calcium 06/26/2020 8 4     eGFR 06/26/2020 51     Magnesium 06/26/2020 2 1     Phosphorus 06/26/2020 2 6     WBC 06/26/2020 7 27     RBC 06/26/2020 3 79*    Hemoglobin 06/26/2020 12 1     Hematocrit 06/26/2020 36 8     MCV 06/26/2020 97     MCH 06/26/2020 31 9     MCHC 06/26/2020 32 9     RDW 06/26/2020 14 6     MPV 06/26/2020 10 2     Platelets 62/30/8906 187     nRBC 06/26/2020 0     Neutrophils Relative 06/26/2020 69     Immat GRANS % 06/26/2020 2     Lymphocytes Relative 06/26/2020 21     Monocytes Relative 06/26/2020 8     Eosinophils Relative 06/26/2020 0     Basophils Relative 06/26/2020 0     Neutrophils Absolute 06/26/2020 4 98     Immature Grans Absolute 06/26/2020 0 15     Lymphocytes Absolute 06/26/2020 1 51     Monocytes Absolute 06/26/2020 0 58     Eosinophils Absolute 06/26/2020 0 03     Basophils Absolute 06/26/2020 0 02     POC Glucose 06/26/2020 117     POC Glucose 06/26/2020 202*    POC Glucose 06/26/2020 273*    POC Glucose 06/26/2020 255*    WBC 06/27/2020 7 15     RBC 06/27/2020 3 79*    Hemoglobin 06/27/2020 12 2     Hematocrit 06/27/2020 37 8     MCV 06/27/2020 100*    MCH 06/27/2020 32 2     MCHC 06/27/2020 32 3     RDW 06/27/2020 14 6     Platelets 17/94/7591 202     MPV 06/27/2020 10 7     Sodium 06/27/2020 138     Potassium 06/27/2020 4 2     Chloride 06/27/2020 104     CO2 06/27/2020 27     ANION GAP 06/27/2020 7     BUN 06/27/2020 16     Creatinine 06/27/2020 0 80     Glucose 06/27/2020 151*    Calcium 06/27/2020 8 8     eGFR 06/27/2020 68     POC Glucose 06/27/2020 121     POC Glucose 06/27/2020 225*       Xr Chest 1 View Portable    Result Date: 6/2/2020  Narrative: CHEST INDICATION:   sob  COMPARISON:  None EXAM PERFORMED/VIEWS:  XR CHEST PORTABLE FINDINGS: Cardiomediastinal silhouette appears unremarkable  The lungs are clear  Tiny benign calcified granuloma in the right upper lung  No pneumothorax or pleural effusion  Osseous structures appear within normal limits for patient age  Impression: No acute cardiopulmonary disease  Workstation performed: [id number]     Ct Head Without Contrast    Result Date: 6/25/2020  Narrative: CT BRAIN - WITHOUT CONTRAST INDICATION:   trauma  COMPARISON:  None   TECHNIQUE:  CT examination of the brain was performed  In addition to axial images, coronal 2D reformatted images were created and submitted for interpretation  Radiation dose length product (DLP) for this visit:  857 42 mGy-cm   This examination, like all CT scans performed in the [facility], was performed utilizing techniques to minimize radiation dose exposure, including the use of iterative  reconstruction and automated exposure control  IMAGE QUALITY:  Diagnostic  FINDINGS: PARENCHYMA:  No intracranial mass, mass effect or midline shift  No CT signs of acute infarction  No acute parenchymal hemorrhage  Atherosclerotic calcification of the vessels at the skull base are noted  Possible small bilateral chronic basal ganglia  lacunar infarct versus dilated perivascular spaces  VENTRICLES AND EXTRA-AXIAL SPACES:  Normal for the patient's age  Probable tiny 7 mm right paramedian posterior parietal calcified meningioma without appreciable mass effect with questionable small calcified meningioma the right sphenoid wing extending into the anterior aspect of the middle cranial fossa  No appreciable mass effect  VISUALIZED ORBITS AND PARANASAL SINUSES:  Bilateral ocular lens implants are present  Visualized paranasal sinuses are clear  CALVARIUM AND EXTRACRANIAL SOFT TISSUES:  Small right posterior parietal scalp contusion with laceration with a small amount subcutaneous air is identified  No underlying fracture noted  Impression: 1  Small right paramedian posterior parietal scalp contusion /laceration without evidence for fracture or acute intracranial abnormality  I personally discussed this study with Denver Ballesteros on 6/25/2020 at 6:05 PM  Workstation performed: [id number]     Ct Cervical Spine Without Contrast    Result Date: 6/25/2020  Narrative: CT CERVICAL SPINE - WITHOUT CONTRAST INDICATION:   trauma  COMPARISON:  None   TECHNIQUE:  CT examination of the cervical spine was performed without intravenous contrast  Contiguous axial images were obtained  Sagittal and coronal reconstructions were performed  Radiation dose length product (DLP) for this visit:  321 85 mGy-cm   This examination, like all CT scans performed in the [facility], was performed utilizing techniques to minimize radiation dose exposure, including the use of iterative  reconstruction and automated exposure control  IMAGE QUALITY:  Diagnostic  FINDINGS: ALIGNMENT:  Normal alignment of the cervical spine  No subluxation  VERTEBRAL BODIES:  No fracture  DEGENERATIVE CHANGES:  No significant cervical degenerative changes are noted  PREVERTEBRAL AND PARASPINAL SOFT TISSUES:  Bilateral cervical carotid artery calcification present with left carotid artery stent  THORACIC INLET:  Normal      Impression: No cervical spine fracture or traumatic malalignment  I personally discussed this study with Blanca Frank on 6/25/2020 at 6:10 PM   Workstation performed: [id number]     Xr Chest 1 View    Result Date: 6/26/2020  Narrative: TRAUMA SERIES INDICATION:  trauma  COMPARISON:  None VIEWS:  XR TRAUMA MULTIPLE  FINDINGS: CHEST: Supine frontal view of the chest is obtained  Cardiomegaly is noted  Lungs are clear  No layering pleural effusions detected  No pneumothorax is seen on this supine film  Upright images are more sensitive to detect anterior pneumothoraces if relevant  No displaced fractures  Impression: No acute cardiopulmonary disease within limitations of supine imaging  Workstation performed: [id number]     Xr Trauma Multiple    Result Date: 6/25/2020  Narrative: TRAUMA SERIES INDICATION:  trauma  COMPARISON:  None VIEWS:  XR TRAUMA MULTIPLE  FINDINGS: CHEST: Supine frontal view of the chest is obtained  Cardiomegaly is noted  Lungs are clear  No layering pleural effusions detected  No pneumothorax is seen on this supine film  Upright images are more sensitive to detect anterior pneumothoraces if relevant  No displaced fractures  Impression: No acute cardiopulmonary disease within limitations of supine imaging  Workstation performed: [id number]     Cta Ed Chest Pe Study    Result Date: 6/2/2020  Narrative: CTA - CHEST WITH IV CONTRAST - PULMONARY ANGIOGRAM INDICATION:   sob, moise, elevated D Dimer  COMPARISON:  Chest radiograph performed earlier today TECHNIQUE: CTA examination of the chest was performed using angiographic technique according to a protocol specifically tailored to evaluate for pulmonary embolism  Axial, sagittal, and coronal 2D reformatted images were created from the source data and  submitted for interpretation  In addition, coronal 3D MIP postprocessing was performed on the acquisition scanner  Radiation dose length product (DLP) for this visit:  484 22 mGy-cm   This examination, like all CT scans performed in the [facility], was performed utilizing techniques to minimize radiation dose exposure, including the use of iterative  reconstruction and automated exposure control  IV Contrast:  100 mL of iohexol (OMNIPAQUE)  FINDINGS: PULMONARY ARTERIAL TREE:  There are filling defects in a right upper lobe segmental artery (2/68) in multiple right lower lobe subsegmental arteries (2/127, 2/140, 2/144 in 2/148)  No definite filling defects in the left pulmonary arterial system  LUNGS:  Subsegmental atelectasis in the right lower lobe  Scattered bilateral punctate calcified granulomas  No solid nodule or mass  There is no tracheal or endobronchial lesion  PLEURA:  Small right pleural effusion  No pneumothorax  HEART/GREAT VESSELS:  Calcified aortic and coronary artery atherosclerosis  Mild cardiomegaly  Left ventricular hypertrophy and right and left atrial dilation  Measured RV/LV ratio is within normal limits at less than 0 9  MEDIASTINUM AND AUGUSTIN:  Unremarkable  CHEST WALL AND LOWER NECK:   Unremarkable  VISUALIZED STRUCTURES IN THE UPPER ABDOMEN:  Unremarkable   OSSEOUS STRUCTURES:  No acute fracture or destructive osseous lesion  Impression: 1  Multiple right-sided segmental and subsegmental pulmonary emboli  Measured RV/LV ratio is within normal limits at less than 0 9  2   Small right pleural effusion  I personally discussed this study with Veena Mcgraw on 6/2/2020 at 5:29 PM  Workstation performed: [id number]     Vas Lower Limb Venous Duplex Study, Complete Bilateral    Result Date: 6/6/2020  Narrative:  [facility] REPORT CLINICAL: Indications: Patient presents with recent discovery of pulmonary embolism and physician wants to determine potential source  Patient denies any leg pain  Operative History: No cardiovascular surgeries Risk Factors The patient has history of HTN, Diabetes, HLD and PAD  FINDINGS:  Segment  Right            Left              Impression       Impression       CFV      Normal (Patent)  Normal (Patent)     CONCLUSION:  Impression: RIGHT LOWER LIMB: No evidence of acute or chronic deep vein thrombosis  No evidence of superficial thrombophlebitis noted  Doppler evaluation shows a normal response to augmentation maneuvers  Popliteal, posterior tibial and anterior tibial arterial Doppler waveforms are biphasic  LEFT LOWER LIMB: No evidence of acute or chronic deep vein thrombosis  No evidence of superficial thrombophlebitis noted  Doppler evaluation shows a normal response to augmentation maneuvers  Popliteal, posterior tibial and anterior tibial arterial Doppler waveforms are biphasic  Technical findings were faxed to chart  SIGNATURE: Electronically Signed by: Jonathan Tavares on 2020-06-06 12:54:42 PM      EKG personally reviewed by Virginie Paz MD      Counseling / Coordination of Care  Total floor / unit time spent today 30 minutes  Greater than 50% of total time was spent with the patient and / or family counseling and / or coordination of care

## 2020-06-27 NOTE — DISCHARGE SUMMARY
Discharge Summary - [facility]ist Service - Internal Medicine      Patient Information: Carmel Thompson 81 y o  female MRN: [id number]  [facility] Encounter: [id number]    Discharging Physician / Practitioner: Edmond Tobin MD  PCP: Cesia Reed DO  Admission Date:   Admission Orders (From admission, onward)     Ordered        06/26/20 1321  Inpatient Admission  Once         06/25/20 2025  Place in Observation  Once                   Discharge Date: 06/27/20      Reason for Admission:  Syncopal episode       Discharge Diagnoses:     Principal Problem:    Syncope and collapse    Active Problems:    Apical variant hypertrophic cardiomyopathy    Essential hypertension    Coronary artery disease    History of pulmonary embolism    Bullous pemphigoid    Diabetes mellitus type 2    Resolved Problems:    Hypokalemia    Hyponatremia      Consultations During Hospital Stay:  · Cardiology      Hospital Course:     Carmel Thompson is a 81 y o  female patient who originally presented to the hospital on 6/25/2020 due to sustaining an unwitnessed syncopal episode  CT of the head and cervical spine were unremarkable for acute hemorrhaging/fractures  Chronic anticoagulation use was noted  Orthostatic vital signs were also negative  There was incidental finding of a scalp contusion on imaging however  She was seen by the trauma service and cleared from their perspective  The etiology of the syncopal episodes likely secondary to dehydration coupled as evidenced by decreased oral intake and hyponatremia with acute kidney injury  Her home Lasix regimen was discontinued per cardiology and her renal function and serum sodium levels normalized on IV fluids  She was encouraged to increase her oral fluid intake in the outpatient setting  Furthermore, she has a history of hypertrophic cardiomyopathy along with atrial fibrillation and coronary artery disease    Her Cardizem and Lopressor continue for the aforementioned conditions  She is anticoagulated chronically on Eliquis  She did also present with a mild troponin leak which peaked at 0 06 likely secondary to her renal insufficiency on admission  Her mild hypokalemia normalized status post repletion  She will follow-up in the outpatient setting with her PCP and cardiology as indicated  She understands and agrees with plan  Condition at Discharge: fair       Discharge Day Visit / Exam:     Vitals: Blood Pressure: 113/83 (06/27/20 1111)  Pulse: 93 (06/27/20 1111)  Temperature: 97 8 °F (36 6 °C) (06/27/20 1111)  Temp Source: Oral (06/26/20 2350)  Respirations: 16 (06/27/20 1111)  Height: 5' 4" (162 6 cm) (06/26/20 9468)  Weight - Scale: 63 2 kg (139 lb 5 3 oz) (06/27/20 0322)  SpO2: 98 % (06/27/20 0728)      Physical exam - I had a face-to-face encounter with the patient on day of discharge  Discussion with Patient and/or Family:  The patient has been advised to return to the ER immediately if any symptoms recur or worsen  Discharge instructions/Information to Patient and/or Family:   See after visit summary for information provided to patient and/or family  Provisions for Follow-Up Care:  See after visit summary for information related to follow-up care and any pertinent home health orders  Disposition:   Home      Discharge Medications:  See after visit summary for reconciled discharge medications provided to patient and/or family  Discharge Statement:  I spent 38 minutes discharging the patient  This time was spent on the day of discharge  I had direct contact with the patient on the day of discharge  Greater than 50% of the total time was spent examining patient, answering all patient questions, arranging and discussing plan of care with patient as well as directly providing post-discharge instructions  Additional time then spent on discharge activities           ** Please Note: This note is constructed using a voice recognition dictation system  An occasional wrong word/phrase or sound-a-like substitution may have been picked up by dictation device due to the inherent limitations of voice recognition software  Read the chart carefully and recognize, using reasonable context, where substitutions may have occurred  **

## 2020-08-10 ENCOUNTER — TELEPHONE (OUTPATIENT)
Dept: DERMATOLOGY | Facility: CLINIC | Age: 85
End: 2020-08-10

## 2020-08-10 NOTE — TELEPHONE ENCOUNTER
Daughter Wilfredo called to schedule a follow up appointment however I do not have anything soon available for her to see you  She stats that a lot has changed since she was last seen and wanted to discuss possibly winging her off the prednisone  She mentioned that 07/17/2020 had a pace maker placed and is now a diabetic, daughter also reports that she  is now having issues With her kidney and is scheduled to see a specialist first week of September  Please advice

## 2020-08-10 NOTE — TELEPHONE ENCOUNTER
Samantha Colin,  Yes its important  That we see her soon   She has bullus pemphigoid and we may indeed want to taper prednisone

## 2020-08-18 NOTE — TELEPHONE ENCOUNTER
[facility] Hospital Medicine Services  DISCHARGE SUMMARY       Date of Admission: 3/24/2017  Date of Discharge:  4/3/2017  Primary Care Physician: Tim Doherty MD    Discharge Diagnoses:  Active Hospital Problems (** Indicates Principal Problem)    Diagnosis Date Noted   • Gait disorder [R26.9] 03/25/2017   • Valvular heart disease [I38]    • Mild dementia [F03.90]    • Lower extremity edema [R60.0]    • Adrenal insufficiency [E27.40]    • Osteoarthrosis [M19.90]    • Hearing loss [H91.90]    • Congestive heart failure [I50.9]    • GERD (gastroesophageal reflux disease) [K21.9]    • Left humeral fracture [S42.302A] 12/01/2015      Resolved Hospital Problems    Diagnosis Date Noted Date Resolved   • **Acute febrile illness- Influenza B [R50.9] 03/25/2017 04/03/2017   • SIRS (systemic inflammatory response syndrome) [R65.10] 03/25/2017 04/03/2017   • Hyponatremia [E87.1]  04/03/2017     Presenting Problem/History of Present Illness  SIRS (systemic inflammatory response syndrome) [R65.10]     History of Present Illness on Day of Discharge:   No complaints today. Breathing much improved, no longer needing O2. Cough improving, still non-productive. No chest pain, SOA, N/V/D or constipation. Wants to leave today.     Hospital Course  Mr. Chao is an 87yo male resident of [facility] with a history of CHF, mild dementia, hypothyroidism, valvular heart disease, sick sinus syndrome, unsteady gait, GERD, chronic lower extremity edema and adrenal insufficiency. He presented to [facility] 3/25/17 with complaints of generalized weakness x 4 days. Family noted cough productive of clear sputum as well. He was seen by his PCP, Dr. Doherty, two days prior to presentation and started on Levofloxacin and Ventolin inhaler.     ED evaluation revealed temperature of 101.4, white blood cell count of 18.76.  Chest x-ray suggestive of possible left pneumonia and he was started on empiric  Patient is on the schedule for 09/22/2020 with Dr Hilton Reef Rocephin and azithromycin IV.  Influenza PCR's were initially reported as negative but  on 3/26 a corrected result was released and reported the patient influenza be positive. Empiric antibiotics were stopped once influenza was confirmed and he was started on Tamiflu.  Speech therapy was consulted to assess the patient however he declined evaluation and patient's daughter denied concerns with swallowing.  Mr. Chao was able to wean off of oxygen during this hospitalization. Cough has improved and continues to be nonproductive.    Patient was noted to have worsening abdominal insufficiency with hyponatremia during this hospitalization.  He was given Decadron fludrocortisone and IV fluids with improvement in his sodium.  Sodium on day of discharge 142.  Recommend repeat BNP in 1 week.      PT/OT consulted to assess the patient.  They did recommend inpatient rehabilitation at discharge and case management was consulted to assist with planning.  Patient was accepted at [facility] and will be transferred in stable condition on 4/3/17.    Pertinent Test Results:    Results from last 7 days  Lab Units 04/02/17  0652 04/01/17  0732 03/30/17  0507   WBC 10*3/mm3 16.71* 20.62* 13.86*   HEMOGLOBIN g/dL 9.3* 10.0* 10.8*   HEMATOCRIT % 30.1* 30.9* 32.3*   PLATELETS 10*3/mm3 384 357 327     Results from last 7 days  Lab Units 04/03/17  0801 04/02/17  0652 04/01/17  0732   SODIUM mmol/L 142 138 134   POTASSIUM mmol/L 4.2 4.5 4.7   CHLORIDE mmol/L 107 107 106   TOTAL CO2 mmol/L 30.0 25.0 26.0   BUN mg/dL 20 23 18   CREATININE mg/dL 0.60 0.70 0.60   GLUCOSE mg/dL 73 74 117*   CALCIUM mg/dL 9.0 8.9 8.7      Influenza A & B, RT PCR [[id number]] (Abnormal) Collected: 03/25/17 0030        Lab Status: Edited Result - FINAL Specimen: Swab from Nasopharynx Updated: 03/26/17 1214        Influenza A PCR Not Detected        Influenza B PCR Detected (A)         Corrected result. Previous result was Not Detected on 3/25/2017  at 0821 EDT          Blood Culture [[id number]] (Normal) Collected: 03/24/17 2219       Lab Status: Final result Specimen: Blood from Arm, Right Updated: 03/29/17 2301        Blood Culture No growth at 5 days       Blood Culture [[id number]] (Normal) Collected: 03/24/17 2213       Lab Status: Final result Specimen: Blood from Groin, right Updated: 03/30/17 0002        Blood Culture No growth at 5 days      Condition on Discharge: Stable, improved    Physical Exam   Temp:  [97.4 °F (36.3 °C)-98.4 °F (36.9 °C)] 98.4 °F (36.9 °C)  Heart Rate:  [79-89] 89  Resp:  [16-20] 16  BP: (114-133)/(49-70) 132/56     Constitutional: no acute distress, awake, alert  Eyes: PERRLA, sclerae anicteric, no conjunctival injection  Neck: supple, no thyromegaly, trachea midline  Respiratory: Clear to auscultation bilaterally, nonlabored respirations   Cardiovascular: RRR, no murmurs, rubs, or gallops, palpable pedal pulses bilaterally  Gastrointestinal: Positive bowel sounds, soft, nontender, nondistended  Musculoskeletal: 1+ non-pitting BLE edema, no clubbing or cyanosis to bilateral lower extremities  Psychiatric: oriented x 3, appropriate affect, cooperative  Neurologic: Strength symmetric in all extremities, Cranial Nerves grossly intact to confrontation     Discharge Disposition  Rehab Facility or Unit (DC - External)    Discharge Medications   Rio Chao Jr.   Home Medication Instructions LESIA:[id number]    Printed on:04/03/17 1040   Medication Information                      acetaminophen (TYLENOL) 325 MG tablet  Take 2 tablets by mouth Every 6 (Six) Hours As Needed for Mild Pain (1-3).             aspirin 81 MG tablet  Take 1 tablet by mouth daily.             bumetanide (BUMEX) 0.5 MG tablet  Take 1 tablet by mouth Daily.             carvedilol (COREG) 3.125 MG tablet  Take 1 tablet by mouth 2 (Two) Times a Day With Meals.             cyanocobalamin 1000 MCG/ML injection  Inject 1 mL into the shoulder, thigh, or buttocks  "Every 28 (Twenty-Eight) Days.             Ergocalciferol (VITAMIN D2) 2000 UNITS tablet  Take 1 tablet by mouth daily.             fludrocortisone 0.1 MG tablet  Take 1 tablet by mouth 2 (Two) Times a Day.             folic acid (FOLVITE) 800 MCG tablet  Take 1 tablet by mouth daily.             levothyroxine (SYNTHROID, LEVOTHROID) 50 MCG tablet  Take 1 tablet by mouth Daily.             lisinopril (PRINIVIL,ZESTRIL) 5 MG tablet  Take 0.5 tablets by mouth Daily.             omeprazole (PriLOSEC) 20 MG capsule  Take 1 capsule by mouth daily.             potassium chloride ER (K-TAB) 20 MEQ tablet controlled-release ER tablet  Take 1 tablet by mouth Daily.             spironolactone (ALDACTONE) 50 MG tablet  Take 1 tablet by mouth Daily.             Syringe 25G X 5/8\" 3 ML misc  1 syringe Every 28 (Twenty-Eight) Days.               Diet Instructions     Diet: Regular; Thin Liquids, No Restrictions       Discharge Diet:  Regular   Fluid Consistency:  Thin Liquids, No Restrictions               Activity Instructions     Activity as Tolerated                   Follow-up Appointments  No future appointments.       Additional Instructions for the Follow-ups that You Need to Schedule     Discharge Follow-up with PCP    As directed    Follow Up Details:  3-5 days after d/c from rehab             Follow-up Information     Follow up with [facility] .    Specialties:  Skilled Nursing Facility, Intermediate Care Facility    Contact information:    [address]  [facility] [address] [phone]        Follow up with Tim Dohetry MD .    Specialties:  Family Medicine, Hospitalist    Why:  3-5 days after d/c from rehab    Contact information:    [address]  [place] [address] [place] [address] [id number] [phone]     CAM Cotton 04/03/17 10:40 AM    Time: Discharge 40 min    Please note that portions of this note may have been completed with a voice recognition program. " Efforts were made to edit the dictations, but occasionally words are mistranscribed.

## 2020-10-13 ENCOUNTER — TELEPHONE (OUTPATIENT)
Dept: DERMATOLOGY | Facility: CLINIC | Age: 85
End: 2020-10-13

## 2020-10-14 ENCOUNTER — TELEPHONE (OUTPATIENT)
Dept: DERMATOLOGY | Facility: CLINIC | Age: 85
End: 2020-10-14

## 2020-10-16 ENCOUNTER — TELEPHONE (OUTPATIENT)
Dept: DERMATOLOGY | Facility: CLINIC | Age: 85
End: 2020-10-16

## 2020-10-23 ENCOUNTER — TELEMEDICINE (OUTPATIENT)
Dept: DERMATOLOGY | Facility: CLINIC | Age: 85
End: 2020-10-23
Payer: COMMERCIAL

## 2020-10-23 DIAGNOSIS — L12.0 BULLOUS PEMPHIGOID: Primary | ICD-10-CM

## 2020-10-23 PROCEDURE — 99213 OFFICE O/P EST LOW 20 MIN: CPT | Performed by: DERMATOLOGY

## 2020-10-23 RX ORDER — FUROSEMIDE 40 MG/1
40 TABLET ORAL 2 TIMES DAILY
COMMUNITY

## 2020-10-23 RX ORDER — PREDNISONE 10 MG/1
10 TABLET ORAL DAILY
Qty: 30 TABLET | Refills: 2 | Status: SHIPPED | OUTPATIENT
Start: 2020-10-23 | End: 2020-12-10 | Stop reason: DRUGHIGH

## 2020-12-03 ENCOUNTER — TELEPHONE (OUTPATIENT)
Dept: DERMATOLOGY | Facility: CLINIC | Age: 85
End: 2020-12-03

## 2020-12-10 ENCOUNTER — TELEMEDICINE (OUTPATIENT)
Dept: DERMATOLOGY | Facility: CLINIC | Age: 85
End: 2020-12-10
Payer: COMMERCIAL

## 2020-12-10 DIAGNOSIS — L12.0 BULLOUS PEMPHIGOID: Primary | ICD-10-CM

## 2020-12-10 PROCEDURE — 99213 OFFICE O/P EST LOW 20 MIN: CPT | Performed by: DERMATOLOGY

## 2020-12-10 RX ORDER — PREDNISONE 2.5 MG
TABLET ORAL
Qty: 195 TABLET | Refills: 0 | Status: SHIPPED | OUTPATIENT
Start: 2020-12-10

## 2021-02-12 DIAGNOSIS — Z23 ENCOUNTER FOR IMMUNIZATION: ICD-10-CM

## 2021-03-19 ENCOUNTER — HOSPITAL ENCOUNTER (INPATIENT)
Facility: HOSPITAL | Age: 86
LOS: 3 days | Discharge: HOME HEALTH | DRG: 315 | End: 2021-03-22
Attending: SURGERY | Admitting: INTERNAL MEDICINE
Payer: COMMERCIAL

## 2021-03-19 ENCOUNTER — APPOINTMENT (INPATIENT)
Dept: NON INVASIVE DIAGNOSTICS | Facility: HOSPITAL | Age: 86
DRG: 315 | End: 2021-03-19
Payer: COMMERCIAL

## 2021-03-19 ENCOUNTER — APPOINTMENT (EMERGENCY)
Dept: RADIOLOGY | Facility: HOSPITAL | Age: 86
DRG: 315 | End: 2021-03-19
Payer: COMMERCIAL
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
## 2021-03-19 DIAGNOSIS — S01.01XA LACERATION OF SCALP, INITIAL ENCOUNTER: Primary | ICD-10-CM

## 2021-03-19 DIAGNOSIS — R55 NEAR SYNCOPE: ICD-10-CM

## 2021-03-19 DIAGNOSIS — I42.2 HYPERTROPHIC NONOBSTRUCTIVE CARDIOMYOPATHY (HCC): ICD-10-CM

## 2021-03-19 PROBLEM — I65.23 BILATERAL CAROTID ARTERY STENOSIS: Status: ACTIVE | Noted: 2021-03-19

## 2021-03-19 PROBLEM — I25.10 CAD (CORONARY ARTERY DISEASE): Status: ACTIVE | Noted: 2021-03-19

## 2021-03-19 PROBLEM — I65.29 CAROTID STENOSIS: Status: ACTIVE | Noted: 2021-03-19

## 2021-03-19 PROBLEM — S01.91XD: Status: ACTIVE | Noted: 2021-03-19

## 2021-03-19 PROBLEM — N17.9 AKI (ACUTE KIDNEY INJURY) (HCC): Status: ACTIVE | Noted: 2021-03-19

## 2021-03-19 PROBLEM — E11.9 TYPE 2 DIABETES MELLITUS, WITHOUT LONG-TERM CURRENT USE OF INSULIN (HCC): Status: ACTIVE | Noted: 2021-03-19

## 2021-03-19 PROBLEM — I42.9 CARDIOMYOPATHY (HCC): Status: ACTIVE | Noted: 2021-03-19

## 2021-03-19 PROBLEM — I48.91 ATRIAL FIBRILLATION (HCC): Status: ACTIVE | Noted: 2021-03-19

## 2021-03-19 PROBLEM — Z86.79 HISTORY OF VENTRICULAR TACHYCARDIA: Status: ACTIVE | Noted: 2021-03-19

## 2021-03-19 PROBLEM — Z86.711 HISTORY OF PULMONARY EMBOLUS (PE): Status: ACTIVE | Noted: 2021-03-19

## 2021-03-19 LAB
ABO GROUP BLD: NORMAL
ABO GROUP BLD: NORMAL
ANION GAP SERPL CALCULATED.3IONS-SCNC: 10 MMOL/L (ref 4–13)
APTT PPP: 25 SECONDS (ref 23–37)
BASE EXCESS BLDA CALC-SCNC: 0 MMOL/L (ref -2–3)
BASOPHILS # BLD AUTO: 0.09 THOUSANDS/ΜL (ref 0–0.1)
BASOPHILS NFR BLD AUTO: 1 % (ref 0–1)
BLD GP AB SCN SERPL QL: NEGATIVE
BUN SERPL-MCNC: 22 MG/DL (ref 5–25)
CALCIUM SERPL-MCNC: 9.7 MG/DL (ref 8.3–10.1)
CHLORIDE SERPL-SCNC: 107 MMOL/L (ref 100–108)
CO2 SERPL-SCNC: 25 MMOL/L (ref 21–32)
CREAT SERPL-MCNC: 1.5 MG/DL (ref 0.6–1.3)
EOSINOPHIL # BLD AUTO: 0.28 THOUSAND/ΜL (ref 0–0.61)
EOSINOPHIL NFR BLD AUTO: 3 % (ref 0–6)
ERYTHROCYTE [DISTWIDTH] IN BLOOD BY AUTOMATED COUNT: 15.4 % (ref 11.6–15.1)
GFR SERPL CREATININE-BSD FRML MDRD: 32 ML/MIN/1.73SQ M
GLUCOSE SERPL-MCNC: 160 MG/DL (ref 65–140)
GLUCOSE SERPL-MCNC: 161 MG/DL (ref 65–140)
GLUCOSE SERPL-MCNC: 163 MG/DL (ref 65–140)
HCO3 BLDA-SCNC: 25.2 MMOL/L (ref 24–30)
HCT VFR BLD AUTO: 39 % (ref 34.8–46.1)
HCT VFR BLD CALC: 36 % (ref 34.8–46.1)
HGB BLD-MCNC: 12.1 G/DL (ref 11.5–15.4)
HGB BLDA-MCNC: 12.2 G/DL (ref 11.5–15.4)
IMM GRANULOCYTES # BLD AUTO: 0.06 THOUSAND/UL (ref 0–0.2)
IMM GRANULOCYTES NFR BLD AUTO: 1 % (ref 0–2)
INR PPP: 1.22 (ref 0.84–1.19)
LYMPHOCYTES # BLD AUTO: 2.03 THOUSANDS/ΜL (ref 0.6–4.47)
LYMPHOCYTES NFR BLD AUTO: 25 % (ref 14–44)
MCH RBC QN AUTO: 27.4 PG (ref 26.8–34.3)
MCHC RBC AUTO-ENTMCNC: 31 G/DL (ref 31.4–37.4)
MCV RBC AUTO: 88 FL (ref 82–98)
MONOCYTES # BLD AUTO: 0.97 THOUSAND/ΜL (ref 0.17–1.22)
MONOCYTES NFR BLD AUTO: 12 % (ref 4–12)
NEUTROPHILS # BLD AUTO: 4.74 THOUSANDS/ΜL (ref 1.85–7.62)
NEUTS SEG NFR BLD AUTO: 58 % (ref 43–75)
NRBC BLD AUTO-RTO: 0 /100 WBCS
PCO2 BLD: 26 MMOL/L (ref 21–32)
PCO2 BLD: 42.8 MM HG (ref 42–50)
PH BLD: 7.38 [PH] (ref 7.3–7.4)
PLATELET # BLD AUTO: 328 THOUSANDS/UL (ref 149–390)
PMV BLD AUTO: 11 FL (ref 8.9–12.7)
PO2 BLD: 19 MM HG (ref 35–45)
POTASSIUM BLD-SCNC: 3.8 MMOL/L (ref 3.5–5.3)
POTASSIUM SERPL-SCNC: 3.8 MMOL/L (ref 3.5–5.3)
PROTHROMBIN TIME: 15.4 SECONDS (ref 11.6–14.5)
RBC # BLD AUTO: 4.42 MILLION/UL (ref 3.81–5.12)
RH BLD: POSITIVE
RH BLD: POSITIVE
SAO2 % BLD FROM PO2: 29 % (ref 60–85)
SODIUM BLD-SCNC: 140 MMOL/L (ref 136–145)
SODIUM SERPL-SCNC: 142 MMOL/L (ref 136–145)
SPECIMEN EXPIRATION DATE: NORMAL
SPECIMEN SOURCE: ABNORMAL
TROPONIN I SERPL-MCNC: <0.02 NG/ML
WBC # BLD AUTO: 8.17 THOUSAND/UL (ref 4.31–10.16)

## 2021-03-19 PROCEDURE — 12032 INTMD RPR S/A/T/EXT 2.6-7.5: CPT | Performed by: SURGERY

## 2021-03-19 PROCEDURE — 86901 BLOOD TYPING SEROLOGIC RH(D): CPT | Performed by: SURGERY

## 2021-03-19 PROCEDURE — 82947 ASSAY GLUCOSE BLOOD QUANT: CPT

## 2021-03-19 PROCEDURE — 85025 COMPLETE CBC W/AUTO DIFF WBC: CPT | Performed by: SURGERY

## 2021-03-19 PROCEDURE — 84484 ASSAY OF TROPONIN QUANT: CPT | Performed by: SURGERY

## 2021-03-19 PROCEDURE — 96365 THER/PROPH/DIAG IV INF INIT: CPT

## 2021-03-19 PROCEDURE — 84132 ASSAY OF SERUM POTASSIUM: CPT

## 2021-03-19 PROCEDURE — 36415 COLL VENOUS BLD VENIPUNCTURE: CPT | Performed by: SURGERY

## 2021-03-19 PROCEDURE — 90715 TDAP VACCINE 7 YRS/> IM: CPT | Performed by: PHYSICIAN ASSISTANT

## 2021-03-19 PROCEDURE — 99285 EMERGENCY DEPT VISIT HI MDM: CPT

## 2021-03-19 PROCEDURE — NC001 PR NO CHARGE: Performed by: PHYSICIAN ASSISTANT

## 2021-03-19 PROCEDURE — NC001 PR NO CHARGE: Performed by: EMERGENCY MEDICINE

## 2021-03-19 PROCEDURE — 90471 IMMUNIZATION ADMIN: CPT

## 2021-03-19 PROCEDURE — 99222 1ST HOSP IP/OBS MODERATE 55: CPT | Performed by: INTERNAL MEDICINE

## 2021-03-19 PROCEDURE — 99223 1ST HOSP IP/OBS HIGH 75: CPT | Performed by: INTERNAL MEDICINE

## 2021-03-19 PROCEDURE — 85610 PROTHROMBIN TIME: CPT | Performed by: SURGERY

## 2021-03-19 PROCEDURE — 85730 THROMBOPLASTIN TIME PARTIAL: CPT | Performed by: SURGERY

## 2021-03-19 PROCEDURE — 85014 HEMATOCRIT: CPT

## 2021-03-19 PROCEDURE — 80048 BASIC METABOLIC PNL TOTAL CA: CPT | Performed by: SURGERY

## 2021-03-19 PROCEDURE — 72125 CT NECK SPINE W/O DYE: CPT

## 2021-03-19 PROCEDURE — 70450 CT HEAD/BRAIN W/O DYE: CPT

## 2021-03-19 PROCEDURE — 36430 TRANSFUSION BLD/BLD COMPNT: CPT

## 2021-03-19 PROCEDURE — P9016 RBC LEUKOCYTES REDUCED: HCPCS

## 2021-03-19 PROCEDURE — 82948 REAGENT STRIP/BLOOD GLUCOSE: CPT

## 2021-03-19 PROCEDURE — 93308 TTE F-UP OR LMTD: CPT | Performed by: SURGERY

## 2021-03-19 PROCEDURE — 84295 ASSAY OF SERUM SODIUM: CPT

## 2021-03-19 PROCEDURE — 86920 COMPATIBILITY TEST SPIN: CPT

## 2021-03-19 PROCEDURE — 93880 EXTRACRANIAL BILAT STUDY: CPT

## 2021-03-19 PROCEDURE — 76705 ECHO EXAM OF ABDOMEN: CPT | Performed by: SURGERY

## 2021-03-19 PROCEDURE — 82803 BLOOD GASES ANY COMBINATION: CPT

## 2021-03-19 PROCEDURE — 86850 RBC ANTIBODY SCREEN: CPT | Performed by: SURGERY

## 2021-03-19 PROCEDURE — 30233N1 TRANSFUSION OF NONAUTOLOGOUS RED BLOOD CELLS INTO PERIPHERAL VEIN, PERCUTANEOUS APPROACH: ICD-10-PCS | Performed by: SURGERY

## 2021-03-19 PROCEDURE — 0HQ0XZZ REPAIR SCALP SKIN, EXTERNAL APPROACH: ICD-10-PCS | Performed by: SURGERY

## 2021-03-19 PROCEDURE — 96366 THER/PROPH/DIAG IV INF ADDON: CPT

## 2021-03-19 PROCEDURE — 86900 BLOOD TYPING SEROLOGIC ABO: CPT | Performed by: SURGERY

## 2021-03-19 PROCEDURE — 99223 1ST HOSP IP/OBS HIGH 75: CPT | Performed by: SURGERY

## 2021-03-19 RX ORDER — FUROSEMIDE 20 MG/1
40 TABLET ORAL EVERY OTHER DAY
Status: ON HOLD | COMMUNITY
End: 2021-03-22 | Stop reason: SDUPTHER

## 2021-03-19 RX ORDER — SODIUM CHLORIDE 9 MG/ML
75 INJECTION, SOLUTION INTRAVENOUS CONTINUOUS
Status: DISCONTINUED | OUTPATIENT
Start: 2021-03-19 | End: 2021-03-20

## 2021-03-19 RX ORDER — AMIODARONE HYDROCHLORIDE 400 MG/1
400 TABLET ORAL DAILY
COMMUNITY

## 2021-03-19 RX ORDER — ASPIRIN 81 MG/1
81 TABLET ORAL DAILY
Status: DISCONTINUED | OUTPATIENT
Start: 2021-03-20 | End: 2021-03-20

## 2021-03-19 RX ORDER — BRIMONIDINE TARTRATE/TIMOLOL 0.2%-0.5%
1 DROPS OPHTHALMIC (EYE) EVERY 12 HOURS SCHEDULED
COMMUNITY
End: 2021-03-19 | Stop reason: CLARIF

## 2021-03-19 RX ORDER — AMIODARONE HYDROCHLORIDE 200 MG/1
400 TABLET ORAL DAILY
Status: DISCONTINUED | OUTPATIENT
Start: 2021-03-20 | End: 2021-03-22 | Stop reason: HOSPADM

## 2021-03-19 RX ORDER — CLOPIDOGREL BISULFATE 75 MG/1
75 TABLET ORAL DAILY
Status: DISCONTINUED | OUTPATIENT
Start: 2021-03-20 | End: 2021-03-22 | Stop reason: HOSPADM

## 2021-03-19 RX ORDER — PREDNISONE 2.5 MG
2.5 TABLET ORAL DAILY
COMMUNITY

## 2021-03-19 RX ORDER — PRAVASTATIN SODIUM 40 MG
40 TABLET ORAL
Status: DISCONTINUED | OUTPATIENT
Start: 2021-03-20 | End: 2021-03-22 | Stop reason: HOSPADM

## 2021-03-19 RX ORDER — MYCOPHENOLATE MOFETIL 500 MG/1
500 TABLET ORAL EVERY 12 HOURS SCHEDULED
COMMUNITY
End: 2021-03-19 | Stop reason: CLARIF

## 2021-03-19 RX ORDER — LIDOCAINE HYDROCHLORIDE AND EPINEPHRINE 10; 10 MG/ML; UG/ML
10 INJECTION, SOLUTION INFILTRATION; PERINEURAL ONCE
Status: COMPLETED | OUTPATIENT
Start: 2021-03-19 | End: 2021-03-19

## 2021-03-19 RX ORDER — ASPIRIN 81 MG/1
81 TABLET ORAL DAILY
COMMUNITY

## 2021-03-19 RX ORDER — SIMVASTATIN 20 MG
20 TABLET ORAL
COMMUNITY

## 2021-03-19 RX ORDER — METOPROLOL SUCCINATE 100 MG/1
175 TABLET, EXTENDED RELEASE ORAL DAILY
COMMUNITY

## 2021-03-19 RX ORDER — ACETAMINOPHEN 325 MG/1
650 TABLET ORAL EVERY 6 HOURS PRN
Status: DISCONTINUED | OUTPATIENT
Start: 2021-03-19 | End: 2021-03-22

## 2021-03-19 RX ORDER — CLOPIDOGREL BISULFATE 75 MG/1
75 TABLET ORAL DAILY
COMMUNITY

## 2021-03-19 RX ORDER — DILTIAZEM HYDROCHLORIDE 120 MG/1
120 CAPSULE, COATED, EXTENDED RELEASE ORAL DAILY
COMMUNITY
End: 2021-03-19 | Stop reason: CLARIF

## 2021-03-19 RX ORDER — CLOTRIMAZOLE 1 %
CREAM (GRAM) TOPICAL 2 TIMES DAILY
COMMUNITY
End: 2021-03-19 | Stop reason: CLARIF

## 2021-03-19 RX ORDER — SODIUM CHLORIDE, SODIUM GLUCONATE, SODIUM ACETATE, POTASSIUM CHLORIDE, MAGNESIUM CHLORIDE, SODIUM PHOSPHATE, DIBASIC, AND POTASSIUM PHOSPHATE .53; .5; .37; .037; .03; .012; .00082 G/100ML; G/100ML; G/100ML; G/100ML; G/100ML; G/100ML; G/100ML
INJECTION, SOLUTION INTRAVENOUS
Status: COMPLETED | OUTPATIENT
Start: 2021-03-19 | End: 2021-03-19

## 2021-03-19 RX ORDER — PREDNISONE 2.5 MG
2.5 TABLET ORAL DAILY
Status: DISCONTINUED | OUTPATIENT
Start: 2021-03-20 | End: 2021-03-22 | Stop reason: HOSPADM

## 2021-03-19 RX ORDER — POTASSIUM CHLORIDE 750 MG/1
10 TABLET, EXTENDED RELEASE ORAL DAILY
COMMUNITY

## 2021-03-19 RX ORDER — ONDANSETRON 2 MG/ML
4 INJECTION INTRAMUSCULAR; INTRAVENOUS EVERY 6 HOURS PRN
Status: DISCONTINUED | OUTPATIENT
Start: 2021-03-19 | End: 2021-03-22 | Stop reason: HOSPADM

## 2021-03-19 RX ADMIN — TETANUS TOXOID, REDUCED DIPHTHERIA TOXOID AND ACELLULAR PERTUSSIS VACCINE, ADSORBED 0.5 ML: 5; 2.5; 8; 8; 2.5 SUSPENSION INTRAMUSCULAR at 11:50

## 2021-03-19 RX ADMIN — APIXABAN 5 MG: 5 TABLET, FILM COATED ORAL at 20:43

## 2021-03-19 RX ADMIN — SODIUM CHLORIDE, SODIUM GLUCONATE, SODIUM ACETATE, POTASSIUM CHLORIDE, MAGNESIUM CHLORIDE, SODIUM PHOSPHATE, DIBASIC, AND POTASSIUM PHOSPHATE 1000 ML: .53; .5; .37; .037; .03; .012; .00082 INJECTION, SOLUTION INTRAVENOUS at 10:43

## 2021-03-19 RX ADMIN — LIDOCAINE HYDROCHLORIDE,EPINEPHRINE BITARTRATE 10 ML: 10; .01 INJECTION, SOLUTION INFILTRATION; PERINEURAL at 11:50

## 2021-03-19 RX ADMIN — SODIUM CHLORIDE 75 ML/HR: 0.9 INJECTION, SOLUTION INTRAVENOUS at 19:38

## 2021-03-19 RX ADMIN — INSULIN LISPRO 1 UNITS: 100 INJECTION, SOLUTION INTRAVENOUS; SUBCUTANEOUS at 23:12

## 2021-03-19 NOTE — ASSESSMENT & PLAN NOTE
· Presented as a trauma alert status post fall at home  · CT head on admission without acute intracranial abnormality or hemorrhage  · Posterior head laceration repaired with 6 sutures on day of admission 03/19  · Appreciate ongoing trauma recommendations

## 2021-03-19 NOTE — ASSESSMENT & PLAN NOTE
· Trauma workup unremarkable aside from posterior scalp laceration as above  · PT/OT evaluations will be appreciated

## 2021-03-19 NOTE — ASSESSMENT & PLAN NOTE
· Noted history of L ICA stent  · September 2017 R ICA with 70-79% stenosis   · Repeat carotid US   · Continue aspirin, statin

## 2021-03-19 NOTE — ED PROVIDER NOTES
Emergency Department Airway Evaluation and Management Form    History  Obtained from: ems  Losartan and Sulfa antibiotics  Chief Complaint   Patient presents with    Trauma     Patient coming from home, fell while walking to bathroom + head strike + plavix and eliquis     HPI   Fall in bathroom with head strike  On plavix and eliquis  Past Medical History:   Diagnosis Date    Diabetes mellitus ([facility] [address] )     Hypertension      History reviewed  No pertinent surgical history  History reviewed  No pertinent family history  Social History     Tobacco Use    Smoking status: Never Smoker    Smokeless tobacco: Never Used   Substance Use Topics    Alcohol use: Not Currently    Drug use: Never     I have reviewed and agree with the history as documented      Review of Systems    Physical Exam  /50   Pulse 60   Temp 97 9 °F (36 6 °C) (Oral)   Resp 18   Wt 60 7 kg (133 lb 13 1 oz)   SpO2 99%     Physical Exam   Airway intact, breath sounds equal, heart reg, pulses intact, GCS 15    ED Medications  Medications   multi-electrolyte (ISOLYTE-S PH 7 4) bolus (1,000 mL Intravenous New Bag 3/19/21 1043)       Intubation  Procedures    Notes      Final Diagnosis  Final diagnoses:   None       ED Provider  Electronically Signed by     Shell Adams DO  03/19/21 8158

## 2021-03-19 NOTE — ASSESSMENT & PLAN NOTE
· Follows with [facility] cardiology  · Most recent echo July 2020 with LVEF 50% and without RWMA  · Follow-up repeat echo   · Hold home dose Lasix 40 mg every other day given RAYMUNDO and hypotension on admission  · Monitor daily weights, intake and output  · Cardiac diet

## 2021-03-19 NOTE — ASSESSMENT & PLAN NOTE
· POA with creatinine 1 50, baseline creatinine approximately 0 7-0 9  · Suspect due to hypotension and diuretic use   · Provide gentle IVF  · Hold Lasix for now   · Avoid nephrotoxins or further hypotension  · Monitor BMP

## 2021-03-19 NOTE — ASSESSMENT & PLAN NOTE
· Patient with noted history of recurrent syncope/presyncope  Prior admission in July 2020 felt to 2/2 dehydration given concurrent RAYMUNDO and hyponatremia  Noted history of VT s/p ICD in July 2020  · Hypotensive on admission, BP improved s/p IVF   · ?  Symptomatic carotid stenosis (see related plan)  · Follow-up ICD interrogation   · Check orthostatics   · Monitor on telemetry   · Check updated echo   · Appreciate cardiology consult   · PT/OT evaluations

## 2021-03-19 NOTE — CASE MANAGEMENT
CM responded to trauma alert  Pt was brought to the ED via [facility] s/p fall in bathroom with + headstrike  Pt has a lac to the back of her head   Pt's dtr is en route to hospital

## 2021-03-19 NOTE — H&P
[facility]  H&P- Jamila Kan 1935, 81 y o  female MRN: [id number]  Unit/Bed#: ED 12 Encounter: [id number]  Primary Care Provider: Zac Delarosa DO   Date and time admitted to hospital: 3/19/2021 10:23 AM    * Near syncope  Assessment & Plan  · Patient with noted history of recurrent syncope/presyncope  Prior admission in July 2020 felt to 2/2 dehydration given concurrent RAYMUNDO and hyponatremia  Noted history of VT s/p ICD in July 2020  · Hypotensive on admission, BP improved s/p IVF   · ?  Symptomatic carotid stenosis (see related plan)  · Follow-up ICD interrogation   · Check orthostatics   · Monitor on telemetry   · Check updated echo   · Appreciate cardiology consult   · PT/OT evaluations     RAYMUNDO (acute kidney injury) ([facility] [address] )  Assessment & Plan  · POA with creatinine 1 50, baseline creatinine approximately 0 7-0 9  · Suspect due to hypotension and diuretic use   · Provide gentle IVF  · Hold Lasix for now   · Avoid nephrotoxins or further hypotension  · Monitor BMP    History of ventricular tachycardia  Assessment & Plan  · S/p ICD in July 2020  · Follows with [facility] cardiology   · Continue home dose amiodarone 400 mg daily, Toprol- mg daily   · Follow-up ICD interrogation     Bilateral carotid artery stenosis  Assessment & Plan  · Noted history of L ICA stent  · September 2017 R ICA with 70-79% stenosis   · Repeat carotid US   · Continue aspirin, statin     Atrial fibrillation (HCC)  Assessment & Plan  · Rate controlled on metoprolol and amiodarone   · AC with Eliquis    Cardiomyopathy ([facility] [address] )  Assessment & Plan  · Follows with [facility] cardiology  · Most recent echo July 2020 with LVEF 50% and without RWMA  · Follow-up repeat echo   · Hold home dose Lasix 40 mg every other day given RAYMUNDO and hypotension on admission  · Monitor daily weights, intake and output  · Cardiac diet    History of pulmonary embolus (PE)  Assessment & Plan  · [facility] with Eliquis    Fall  Assessment & Plan  · Trauma workup unremarkable aside from posterior scalp laceration as above  · PT/OT evaluations will be appreciated    Laceration of head, subsequent encounter  Assessment & Plan  · Presented as a trauma alert status post fall at home  · CT head on admission without acute intracranial abnormality or hemorrhage  · Posterior head laceration repaired with 6 sutures on day of admission 03/19  · Appreciate ongoing trauma recommendations    CAD (coronary artery disease)  Assessment & Plan  · Status post PCI x2  · Continue aspirin, statin, Plavix, beta-blocker    Type 2 diabetes mellitus, without long-term current use of insulin (HCC)  Assessment & Plan  No results found for: HGBA1C    No results for input(s): POCGLU in the last 72 hours  Blood Sugar Average: Last 72 hrs:    · Hold oral medications while inpatient  · Noted patient on chronic steroids for dermatologic purposes, currently being weaned in the outpatient setting   · Q i d  Accu-Cheks with SSI coverage  · Diabetic diet  · Hypoglycemia protocol    VTE Prophylaxis: Apixaban (Eliquis)  / sequential compression device   Code Status: Full code   POLST: There is no POLST form on file for this patient (pre-hospital)  Discussion with family: daughter at bedside    Anticipated Length of Stay:  Patient will be admitted on an Inpatient basis with an anticipated length of stay of  > 2 midnights  Justification for Hospital Stay: syncope, cardiology evaluation     Total Time for Visit, including Counseling / Coordination of Care: 20 minutes  Greater than 50% of this total time spent on direct patient counseling and coordination of care      Chief Complaint:   "I almost passed out"    History of Present Illness:    Ines Pineda is a 81 y o  female past medical history significant for hypertrophic cardiomyopathy with preserved LVEF, V-tach status post ICD, atrial fibrillation and PE on Eliquis, bilateral carotid artery stenosis, hypertension, hyperlipidemia, steroid induced hyperglycemia, who presents with near syncope leading to fall with head trauma  Patient initially presented as trauma alert  Noted to have posterior scalp laceration status post repair with 6 sutures in the ED  Otherwise traumatic workup unremarkable  Patient reports this has been ongoing issue for several months  She had been admitted in June 2020 and was attributed to dehydration at that time  Subsequently followed up with Cardiology with [facility] and during her office visit in July 2020 became unresponsive with sustained ventricular tachycardia requiring shock x3  Patient subsequently had ICD placed  Also, with noted history of bilateral carotid artery stenosis, status post left-sided stent  At the time of my evaluation, patient offers no complaints  Denies pain, palpitations, lightheadedness/dizziness, chest pain or shortness of breath  She states she has been in her normal state of health up until today when she had an episode of lightheadedness leading to fall and head straight  Denies recent nausea, vomiting, diarrhea, or lack of oral intake  Review of Systems:    Review of Systems   Constitutional: Negative for chills, fatigue and fever  HENT: Negative for congestion  Respiratory: Negative for cough and shortness of breath  Cardiovascular: Positive for palpitations  Negative for chest pain  Gastrointestinal: Negative for abdominal pain, constipation, diarrhea, nausea and vomiting  Genitourinary: Negative for dysuria  Neurological: Positive for dizziness, weakness and light-headedness  Negative for syncope (Near syncope)  Past Medical and Surgical History:     Past Medical History:   Diagnosis Date    Diabetes mellitus ([facility] [address] )     Hypertension        History reviewed  No pertinent surgical history  Meds/Allergies:    Prior to Admission medications    Medication Sig Start Date End Date Taking?  Authorizing Provider   amiodarone (PACERONE) 400 MG tablet Take 400 mg by mouth daily   Yes Historical Provider, MD   apixaban (Eliquis) 5 mg Take 5 mg by mouth 2 (two) times a day   Yes Historical Provider, MD   aspirin (ECOTRIN LOW STRENGTH) 81 mg EC tablet Take 81 mg by mouth daily   Yes Historical Provider, MD   clopidogrel (PLAVIX) 75 mg tablet Take 75 mg by mouth daily   Yes Historical Provider, MD   furosemide (LASIX) 20 mg tablet Take 40 mg by mouth every other day    Yes Historical Provider, MD   metFORMIN (GLUCOPHAGE) 500 mg tablet Take 850 mg by mouth daily with breakfast    Yes Historical Provider, MD   metoprolol succinate (TOPROL-XL) 100 mg 24 hr tablet Take 175 mg by mouth daily   Yes Historical Provider, MD   potassium chloride (K-DUR,KLOR-CON) 10 mEq tablet Take 10 mEq by mouth daily   Yes Historical Provider, MD   predniSONE 2 5 mg tablet Take 2 5 mg by mouth daily   Yes Historical Provider, MD   simvastatin (ZOCOR) 20 mg tablet Take 20 mg by mouth daily at bedtime   Yes Historical Provider, MD   brimonidine-timolol (Combigan) 0 2-0 5 % Administer 1 drop to both eyes every 12 (twelve) hours  3/19/21 Yes Historical Provider, MD   clotrimazole (LOTRIMIN) 1 % cream Apply topically 2 (two) times a day  3/19/21 Yes Historical Provider, MD   diltiazem (CARDIZEM CD) 120 mg 24 hr capsule Take 120 mg by mouth daily  3/19/21 Yes Historical Provider, MD   metoprolol tartrate (LOPRESSOR) 25 mg tablet Take 100 mg by mouth every 12 (twelve) hours   3/19/21 Yes Historical Provider, MD   mupirocin (BACTROBAN) 2 % ointment Apply topically daily  3/19/21 Yes Historical Provider, MD   mycophenolate (CELLCEPT) 500 mg tablet Take 500 mg by mouth every 12 (twelve) hours  3/19/21 Yes Historical Provider, MD   triamcinolone (KENALOG) 0 1 % ointment Apply topically 2 (two) times a day  3/19/21 Yes Historical Provider, MD     I have reviewed home medications with a medical source (PCP, Pharmacy, other)  Allergies:    Allergies   Allergen Reactions    Losartan Anaphylaxis    Sulfa Antibiotics Anaphylaxis       Social History:     Marital Status:    Occupation:   Patient Pre-hospital Living Situation: home alone   Patient Pre-hospital Level of Mobility:   Patient Pre-hospital Diet Restrictions:   Substance Use History:   Social History     Substance and Sexual Activity   Alcohol Use Not Currently     Social History     Tobacco Use   Smoking Status Never Smoker   Smokeless Tobacco Never Used     Social History     Substance and Sexual Activity   Drug Use Never       Family History:    History reviewed  No pertinent family history  Physical Exam:     Vitals:   Blood Pressure: 99/52 (03/19/21 1830)  Pulse: 60 (03/19/21 1830)  Temperature: 97 9 °F (36 6 °C) (03/19/21 1030)  Temp Source: Oral (03/19/21 1030)  Respirations: 17 (03/19/21 1830)  Weight - Scale: 60 7 kg (133 lb 13 1 oz) (03/19/21 1030)  SpO2: 98 % (03/19/21 1830)    Physical Exam  Vitals signs and nursing note reviewed  HENT:      Head:      Comments: Noted posterior scalp laceration status post suture repair  Cardiovascular:      Rate and Rhythm: Normal rate and regular rhythm  Pulses: Normal pulses  Heart sounds: No murmur  Pulmonary:      Effort: Pulmonary effort is normal  No respiratory distress  Breath sounds: No wheezing or rales  Abdominal:      General: Abdomen is flat  There is no distension  Palpations: Abdomen is soft  Tenderness: There is no abdominal tenderness  Musculoskeletal:      Right lower leg: No edema  Left lower leg: No edema  Skin:     General: Skin is warm and dry  Coloration: Skin is not pale  Findings: No erythema  Neurological:      Mental Status: She is alert and oriented to person, place, and time  Mental status is at baseline  Additional Data:     Lab Results: I have personally reviewed pertinent reports        Results from last 7 days   Lab Units 03/19/21  1047   WBC Thousand/uL 8 17   HEMOGLOBIN g/dL 12 1   HEMATOCRIT % 39 0   PLATELETS Thousands/uL 328   NEUTROS PCT % 58   LYMPHS PCT % 25   MONOS PCT % 12   EOS PCT % 3     Results from last 7 days   Lab Units 03/19/21  1047   SODIUM mmol/L 142   POTASSIUM mmol/L 3 8   CHLORIDE mmol/L 107   CO2 mmol/L 25   BUN mg/dL 22   CREATININE mg/dL 1 50*   ANION GAP mmol/L 10   CALCIUM mg/dL 9 7   GLUCOSE RANDOM mg/dL 161*     Results from last 7 days   Lab Units 03/19/21  1047   INR  1 22*                   Imaging: I have personally reviewed pertinent reports  TRAUMA - CT head wo contrast   Final Result by Queenie Grossman MD (03/19 1141)      No acute intracranial abnormality  I personally discussed this study with Raheel Salcedo on 3/19/2021 at 11:21 AM                      Workstation performed: [id number]         TRAUMA - CT spine cervical wo contrast   Final Result by Queenie Grossman MD (03/19 1141)      No cervical spine fracture or traumatic malalignment  I personally discussed this study with Raheel Salcedo on 3/19/2021 at 11:21 AM              Workstation performed: [id number]         XR trauma multiple   Final Result by Queenie Grossman MD (03/19 1045)      No acute cardiopulmonary disease within limitations of supine imaging  Workstation performed: [id number]         XR chest 1 view    (Results Pending)   VAS carotid complete study    (Results Pending)       EKG, Pathology, and Other Studies Reviewed on Admission:   · EKG: n/a    Allscripts / Epic Records Reviewed: Yes     ** Please Note: This note has been constructed using a voice recognition system   **

## 2021-03-19 NOTE — ASSESSMENT & PLAN NOTE
· S/p ICD in July 2020  · Follows with [facility] cardiology   · Continue home dose amiodarone 400 mg daily, Toprol- mg daily   · Follow-up ICD interrogation

## 2021-03-19 NOTE — H&P
H&P Exam - Trauma   Esther Gonzalez 81 y o  female MRN: [id number]  Unit/Bed#: ED 12 Encounter: [id number]    Assessment/Plan   Trauma Alert: Level B  Model of Arrival: Ambulance  Trauma Team: Attending Randall Osuna and YOGESH [id number] Javad Fry  Consultants: None    Trauma Active Problems:   · Scalp Laceration  · Hypotension  · Fall (ground level)    Trauma Plan: CTH, CT C-Spine, CXR, iSTAT, hypotension resuscitation, scalp laceration repair    Chief Complaint: Fall    History of Present Illness   HPI:  Esther Gonzalez is a 81 y o  female who presents s/p fall  Patient notes she was in her bathroom, began to feel dizzy, and subsequently fell, striking her head on an unknown object  The patient is on Plavix and Eliquis  No LOC or amnesia to event  She noted blood from her head immediately  The patient notes she has had similar dizziness in the past which lead to [facility] placement  The patient notes occipital tenderness and dizziness but otherwise endorses no complaints  Laceration to right-sided occiput  Bruise to right elbow  No pelvic tenderness or instability  No spinal TTP throughout; no step-offs or visible deformities    Review of Systems    12-point, complete review of systems was reviewed and negative except as stated above  Historical Information     Past Medical History:   Diagnosis Date    Diabetes mellitus ([facility] [address] )     Hypertension      History reviewed  No pertinent surgical history  Social History   Social History     Substance and Sexual Activity   Alcohol Use Not Currently     Social History     Substance and Sexual Activity   Drug Use Never     Social History     Tobacco Use   Smoking Status Never Smoker   Smokeless Tobacco Never Used     E-Cigarette/Vaping    E-Cigarette Use Never User      E-Cigarette/Vaping Substances     There is no immunization history for the selected administration types on file for this patient    Last Tetanus: Uncertain - will update today  Family History: Non-contributory    Meds/Allergies   current meds:   Current Facility-Administered Medications   Medication Dose Route Frequency    tetanus-diphtheria-acellular pertussis (BOOSTRIX) IM injection 0 5 mL  0 5 mL Intramuscular Once       Allergies   Allergen Reactions    Losartan Anaphylaxis    Sulfa Antibiotics Anaphylaxis     PHYSICAL EXAM    Objective   Vitals:   First set: Temperature: 97 9 °F (36 6 °C) (03/19/21 1030)  Pulse: 81 (03/19/21 1025)  Respirations: 18 (03/19/21 1025)  Blood Pressure: (!) 185/81 (03/19/21 1025)    Primary Survey:   (A) Airway: Intact  (B) Breathing: Bilateral breath sounds with equal chest expansion bilaterally  (C) Circulation: Pulses: 2+ radial, femoral, and DP pulses bilaterally  (D) Disabliity:  GCS Total:  15  (E) Expose:  Completed    Secondary Survey: (Click on Physical Exam tab above)  Physical Exam  Vitals signs and nursing note reviewed  Constitutional:       General: She is not in acute distress  Appearance: Normal appearance  She is normal weight  She is not ill-appearing, toxic-appearing or diaphoretic  HENT:      Head: Normocephalic  Comments: Laceration to posterior occiput with moderate arterial bleeding; saturating numerous gauze pads prior to arrival  No signs of skull depression     Right Ear: Tympanic membrane, ear canal and external ear normal  There is no impacted cerumen  Left Ear: Tympanic membrane, ear canal and external ear normal  There is no impacted cerumen  Ears:      Comments: No hemotympanum  Negative olivas sign     Nose: Nose normal       Comments: Atraumatic     Mouth/Throat:      Mouth: Mucous membranes are moist       Comments: Atraumatic  Eyes:      General: No scleral icterus  Right eye: No discharge  Left eye: No discharge  Extraocular Movements: Extraocular movements intact        Conjunctiva/sclera: Conjunctivae normal       Comments: Atraumatic  Negative raccoon eyes   Neck:      Comments: Patient immobilized in a cervical collar  No C-spine tenderness to palpation; no gross deformities, step-offs, or ecchymosis present  Cardiovascular:      Rate and Rhythm: Normal rate and regular rhythm  Pulses: Normal pulses  Heart sounds: Normal heart sounds  No murmur  No friction rub  No gallop  Comments: 2+ radial, femoral, and DP pulses bilaterally  Pulmonary:      Effort: Pulmonary effort is normal  No respiratory distress  Breath sounds: Normal breath sounds  No stridor  No wheezing, rhonchi or rales  Comments: Bilateral breath sounds with equal expansion  Chest:      Chest wall: No tenderness  Abdominal:      Comments: Soft, nontender, nondistended, and without organomegaly   Genitourinary:     Comments: No perineal hematoma  Musculoskeletal:      Comments: Normal range of motion of bilateral upper and lower extremities  No tenderness to palpation of bilateral upper and lower extremities  No chest wall, clavicular, or pelvic tenderness to palpation  No pelvic instability   Skin:     General: Skin is warm and dry  Capillary Refill: Capillary refill takes less than 2 seconds  Coloration: Skin is not jaundiced or pale  Comments: Ecchymosis to right elbow posteriorly  Approximately 8 cm laceration to the patient's posterior occiput with active arterial bleeding   Neurological:      General: No focal deficit present  Mental Status: She is alert and oriented to person, place, and time  Mental status is at baseline        Comments: 5/5 strength in bilateral upper and lower extremities  Sensation equal and intact in bilateral upper and lower extremities  Patient able to puff cheeks, raise eyebrows, smile, and frown without difficulty  No focal neurologic deficits or weaknesses  No numbness or tingling on examination  No stroke-like symptoms  Overall, a normal neurologic examination  GCS 15   Psychiatric:         Mood and Affect: Mood normal          Behavior: Behavior normal  Invasive Devices     Peripheral Intravenous Line            Peripheral IV 03/19/21 Left Antecubital less than 1 day    Peripheral IV 03/19/21 Left Forearm less than 1 day    Peripheral IV 03/19/21 Right Wrist less than 1 day              Lab Results:   Results: I have personally reviewed pertinent reports   , BMP/CMP:   Lab Results   Component Value Date    SODIUM 142 03/19/2021    K 3 8 03/19/2021     03/19/2021    CO2 25 03/19/2021    CO2 26 03/19/2021    BUN 22 03/19/2021    CREATININE 1 50 (H) 03/19/2021    GLUCOSE 163 (H) 03/19/2021    CALCIUM 9 7 03/19/2021    EGFR 32 03/19/2021    and CBC:   Lab Results   Component Value Date    WBC 8 17 03/19/2021    HGB 12 1 03/19/2021    HCT 39 0 03/19/2021    MCV 88 03/19/2021     03/19/2021    MCH 27 4 03/19/2021    MCHC 31 0 (L) 03/19/2021    RDW 15 4 (H) 03/19/2021    MPV 11 0 03/19/2021    NRBC 0 03/19/2021     Imaging/EKG Studies: reviewed  Other Studies: reviewed    Code Status: No Order  Advance Directive and Living Will:      Power of :    POLST:

## 2021-03-19 NOTE — ASSESSMENT & PLAN NOTE
No results found for: HGBA1C    No results for input(s): POCGLU in the last 72 hours      Blood Sugar Average: Last 72 hrs:    · Hold oral medications while inpatient  · Noted patient on chronic steroids for dermatologic purposes, currently being weaned in the outpatient setting   · Q i d  Accu-Cheks with SSI coverage  · Diabetic diet  · Hypoglycemia protocol

## 2021-03-19 NOTE — PROCEDURES
Laceration repair    Date/Time: 3/19/2021 12:59 PM  Performed by: Marly Kirkland PA-C  Authorized by: Marly Kirkland PA-C   Consent: Verbal consent obtained  Risks and benefits: risks, benefits and alternatives were discussed  Consent given by: patient  Patient understanding: patient states understanding of the procedure being performed  Patient consent: the patient's understanding of the procedure matches consent given  Procedure consent: procedure consent matches procedure scheduled  Site marked: the operative site was marked  Patient identity confirmed: verbally with patient  Body area: head/neck  Location details: scalp  Laceration length: 6 cm  Tendon involvement: none  Nerve involvement: none  Anesthesia: local infiltration    Anesthesia:  Local Anesthetic: lidocaine 1% with epinephrine  Anesthetic total: 6 mL    Sedation:  Patient sedated: no      Wound Dehiscence:  Superficial Wound Dehiscence: simple closure      Procedure Details:  Preparation: Patient was prepped and draped in the usual sterile fashion    Irrigation solution: saline  Irrigation method: jet lavage and syringe  Amount of cleaning: extensive  Debridement: none  Degree of undermining: none  Skin closure: 4-0 nylon  Number of sutures: 6  Technique: simple  Approximation: close  Approximation difficulty: simple  Dressing: 4x4 sterile gauze  Patient tolerance: patient tolerated the procedure well with no immediate complications  Comments: Wound opened and clots evacuated from the wound  Removed 1 figure-of-eight stitch that was used for hemostasis in the Trauma [place]  Extensive cleaning with Betadine, alcohol swab, and irrigation with saline with a syringe  Wound explored  Closed with 6 simple interrupted sutures  Patient tolerated procedure well

## 2021-03-19 NOTE — PROCEDURES
POC FAST US    Date/Time: 3/19/2021 10:50 AM  Performed by: Maia Coates PA-C  Authorized by: Maia Coates PA-C     Unsuccesful Attempt    Patient location:  Trauma  Other Assisting Provider: Yes (comment) Lanette Stands )    Procedure details:     Exam Type:  Diagnostic    Indications comment:  Fall    Assess for:  Intra-abdominal fluid, pericardial effusion and pneumothorax    Technique: FAST      Views obtained:  Heart - Pericardial sac, RUQ - Ceja's Pouch, LUQ - Splenorenal space and Suprapubic - Pouch of Flore[place]io    Image quality: diagnostic      Image availability:  Images available in PACS  FAST Findings:     RUQ (Hepatorenal) free fluid: absent      LUQ (Splenorenal) free fluid: absent      Suprapubic free fluid: absent      Cardiac wall motion: identified      Pericardial effusion: absent    Interpretation:     Impressions: negative    Comments:      Negative FAST examination

## 2021-03-20 ENCOUNTER — APPOINTMENT (INPATIENT)
Dept: NON INVASIVE DIAGNOSTICS | Facility: HOSPITAL | Age: 86
DRG: 315 | End: 2021-03-20
Payer: COMMERCIAL

## 2021-03-20 ENCOUNTER — TELEPHONE (OUTPATIENT)
Dept: NON INVASIVE DIAGNOSTICS | Facility: HOSPITAL | Age: 86
End: 2021-03-20

## 2021-03-20 PROBLEM — L12.0 BULLOUS PEMPHIGOID: Status: ACTIVE | Noted: 2021-03-20

## 2021-03-20 LAB
ANION GAP SERPL CALCULATED.3IONS-SCNC: 6 MMOL/L (ref 4–13)
ATRIAL RATE: 62 BPM
BASOPHILS # BLD AUTO: 0.05 THOUSANDS/ΜL (ref 0–0.1)
BASOPHILS NFR BLD AUTO: 1 % (ref 0–1)
BUN SERPL-MCNC: 23 MG/DL (ref 5–25)
CALCIUM SERPL-MCNC: 8.5 MG/DL (ref 8.3–10.1)
CHLORIDE SERPL-SCNC: 111 MMOL/L (ref 100–108)
CO2 SERPL-SCNC: 24 MMOL/L (ref 21–32)
CREAT SERPL-MCNC: 1.32 MG/DL (ref 0.6–1.3)
EOSINOPHIL # BLD AUTO: 0.13 THOUSAND/ΜL (ref 0–0.61)
EOSINOPHIL NFR BLD AUTO: 2 % (ref 0–6)
ERYTHROCYTE [DISTWIDTH] IN BLOOD BY AUTOMATED COUNT: 15.6 % (ref 11.6–15.1)
GFR SERPL CREATININE-BSD FRML MDRD: 37 ML/MIN/1.73SQ M
GLUCOSE SERPL-MCNC: 113 MG/DL (ref 65–140)
GLUCOSE SERPL-MCNC: 138 MG/DL (ref 65–140)
GLUCOSE SERPL-MCNC: 147 MG/DL (ref 65–140)
GLUCOSE SERPL-MCNC: 163 MG/DL (ref 65–140)
GLUCOSE SERPL-MCNC: 241 MG/DL (ref 65–140)
HCT VFR BLD AUTO: 32.5 % (ref 34.8–46.1)
HGB BLD-MCNC: 9.8 G/DL (ref 11.5–15.4)
IMM GRANULOCYTES # BLD AUTO: 0.05 THOUSAND/UL (ref 0–0.2)
IMM GRANULOCYTES NFR BLD AUTO: 1 % (ref 0–2)
LYMPHOCYTES # BLD AUTO: 1.46 THOUSANDS/ΜL (ref 0.6–4.47)
LYMPHOCYTES NFR BLD AUTO: 18 % (ref 14–44)
MAGNESIUM SERPL-MCNC: 2 MG/DL (ref 1.6–2.6)
MCH RBC QN AUTO: 26.9 PG (ref 26.8–34.3)
MCHC RBC AUTO-ENTMCNC: 30.2 G/DL (ref 31.4–37.4)
MCV RBC AUTO: 89 FL (ref 82–98)
MONOCYTES # BLD AUTO: 0.92 THOUSAND/ΜL (ref 0.17–1.22)
MONOCYTES NFR BLD AUTO: 11 % (ref 4–12)
NEUTROPHILS # BLD AUTO: 5.47 THOUSANDS/ΜL (ref 1.85–7.62)
NEUTS SEG NFR BLD AUTO: 67 % (ref 43–75)
NRBC BLD AUTO-RTO: 0 /100 WBCS
P AXIS: 80 DEGREES
PLATELET # BLD AUTO: 214 THOUSANDS/UL (ref 149–390)
PMV BLD AUTO: 11.3 FL (ref 8.9–12.7)
POTASSIUM SERPL-SCNC: 4.1 MMOL/L (ref 3.5–5.3)
PR INTERVAL: 246 MS
QRS AXIS: -1 DEGREES
QRSD INTERVAL: 116 MS
QT INTERVAL: 476 MS
QTC INTERVAL: 483 MS
RBC # BLD AUTO: 3.64 MILLION/UL (ref 3.81–5.12)
SODIUM SERPL-SCNC: 141 MMOL/L (ref 136–145)
T WAVE AXIS: 123 DEGREES
VENTRICULAR RATE: 62 BPM
WBC # BLD AUTO: 8.08 THOUSAND/UL (ref 4.31–10.16)

## 2021-03-20 PROCEDURE — 80048 BASIC METABOLIC PNL TOTAL CA: CPT | Performed by: PHYSICIAN ASSISTANT

## 2021-03-20 PROCEDURE — 93005 ELECTROCARDIOGRAM TRACING: CPT

## 2021-03-20 PROCEDURE — 99222 1ST HOSP IP/OBS MODERATE 55: CPT | Performed by: INTERNAL MEDICINE

## 2021-03-20 PROCEDURE — 93880 EXTRACRANIAL BILAT STUDY: CPT | Performed by: SURGERY

## 2021-03-20 PROCEDURE — 99232 SBSQ HOSP IP/OBS MODERATE 35: CPT | Performed by: SURGERY

## 2021-03-20 PROCEDURE — 93010 ELECTROCARDIOGRAM REPORT: CPT | Performed by: INTERNAL MEDICINE

## 2021-03-20 PROCEDURE — C8929 TTE W OR WO FOL WCON,DOPPLER: HCPCS

## 2021-03-20 PROCEDURE — 85025 COMPLETE CBC W/AUTO DIFF WBC: CPT | Performed by: PHYSICIAN ASSISTANT

## 2021-03-20 PROCEDURE — 99232 SBSQ HOSP IP/OBS MODERATE 35: CPT | Performed by: PHYSICIAN ASSISTANT

## 2021-03-20 PROCEDURE — 83735 ASSAY OF MAGNESIUM: CPT | Performed by: PHYSICIAN ASSISTANT

## 2021-03-20 PROCEDURE — 93306 TTE W/DOPPLER COMPLETE: CPT | Performed by: INTERNAL MEDICINE

## 2021-03-20 PROCEDURE — 82948 REAGENT STRIP/BLOOD GLUCOSE: CPT

## 2021-03-20 RX ADMIN — PERFLUTREN 0.4 ML/MIN: 6.52 INJECTION, SUSPENSION INTRAVENOUS at 09:20

## 2021-03-20 RX ADMIN — METOPROLOL SUCCINATE 175 MG: 100 TABLET, EXTENDED RELEASE ORAL at 09:03

## 2021-03-20 RX ADMIN — AMIODARONE HYDROCHLORIDE 400 MG: 200 TABLET ORAL at 09:04

## 2021-03-20 RX ADMIN — CLOPIDOGREL BISULFATE 75 MG: 75 TABLET ORAL at 09:04

## 2021-03-20 RX ADMIN — APIXABAN 5 MG: 5 TABLET, FILM COATED ORAL at 16:50

## 2021-03-20 RX ADMIN — PRAVASTATIN SODIUM 40 MG: 40 TABLET ORAL at 17:04

## 2021-03-20 RX ADMIN — ASPIRIN 81 MG: 81 TABLET, COATED ORAL at 09:03

## 2021-03-20 RX ADMIN — INSULIN LISPRO 1 UNITS: 100 INJECTION, SOLUTION INTRAVENOUS; SUBCUTANEOUS at 16:49

## 2021-03-20 RX ADMIN — APIXABAN 5 MG: 5 TABLET, FILM COATED ORAL at 09:04

## 2021-03-20 RX ADMIN — PREDNISONE 2.5 MG: 2.5 TABLET ORAL at 09:04

## 2021-03-20 NOTE — ASSESSMENT & PLAN NOTE
· S/p ICD in July 2020  · Follows with [facility] cardiology   · Continue home dose amiodarone 400 mg daily, Toprol- mg daily   · ICD interrogation without events   · Telemetry review unremarkable

## 2021-03-20 NOTE — ASSESSMENT & PLAN NOTE
· Follows with [facility] cardiology  · Most recent echo July 2020 with LVEF 50% and without RWMA  · Repeat echo essentially unchanged from prior  · Appreciate cardiology input   · Continue to hold home dose Lasix 40 mg every other day   · Monitor daily weights, intake and output  · Cardiac diet

## 2021-03-20 NOTE — ASSESSMENT & PLAN NOTE
· Status post PCI x3  · Troponin negative and EKG without ischemic changes   · Continue statin, Plavix, beta-blocker

## 2021-03-20 NOTE — ASSESSMENT & PLAN NOTE
· Noted history and follows with dermatology as outpatient   · Continue with prednisone 2 5 mg daily

## 2021-03-20 NOTE — PROGRESS NOTES
[facility]  Progress Note - Flakita Milder 1935, 81 y o  female MRN: [id number]  Unit/Bed#: CW2 213-02 Encounter: [id number]  Primary Care Provider: Silvia Muñoz DO   Date and time admitted to hospital: 3/19/2021 10:23 AM    * Near syncope  Assessment & Plan  · Patient with noted history of recurrent syncope/presyncope  Prior admission in July 2020 felt to 2/2 dehydration given concurrent RAYMUNDO and hyponatremia  Noted history of VT s/p ICD in July 2020  · Hypotensive on admission, BP improved s/p IVF   · ? Symptomatic carotid stenosis (see related plan)  · ICD interrogation without events   · Continue to monitor orthostatics   · Telemetry review unremarkable   · Repeat echo: Systolic function was normal  Ejection fraction was estimated to be 55 %  Although no diagnostic regional wall motion abnormality was identified, this possibility cannot be completely excluded on the basis of this study  No significant valvular abnormalities     · Appreciate cardiology consult and recommendations   · PT/OT evaluations pending     RAYMUNDO (acute kidney injury) ([facility] [address] )  Assessment & Plan  · POA with creatinine 1 50, baseline creatinine approximately 0 7-0 9  · Suspect due to hypotension and diuretic use   · Creatinine improved to 1 35 today s/p gentle IVF resuscitation   · Per cardiology, discontinue IVF and continue to hold Lasix for now  · Avoid nephrotoxins or further hypotension  · Monitor BMP    History of ventricular tachycardia  Assessment & Plan  · S/p ICD in July 2020  · Follows with [facility] cardiology   · Continue home dose amiodarone 400 mg daily, Toprol- mg daily   · ICD interrogation without events   · Telemetry review unremarkable     Bilateral carotid artery stenosis  Assessment & Plan  · Noted history of L ICA stent  · September 2017 R ICA with 70-79% stenosis   · Repeat carotid US with stable disease  · Continue statin, Plavix    · Outpatient follow-up     Atrial fibrillation ([facility] )  Assessment & Plan  · Rate controlled on metoprolol and amiodarone   · AC with Eliquis    Cardiomyopathy ([facility] )  Assessment & Plan  · Follows with [facility] cardiology  · Most recent echo July 2020 with LVEF 50% and without RWMA  · Repeat echo essentially unchanged from prior  · Appreciate cardiology input   · Continue to hold home dose Lasix 40 mg every other day   · Monitor daily weights, intake and output  · Cardiac diet    CAD (coronary artery disease)  Assessment & Plan  · Status post PCI x3  · Troponin negative and EKG without ischemic changes   · Continue statin, Plavix, beta-blocker    History of pulmonary embolus (PE)  Assessment & Plan  · AC with Eliquis    Bullous pemphigoid  Assessment & Plan  · Noted history and follows with dermatology as outpatient   · Continue with prednisone 2 5 mg daily     Fall  Assessment & Plan  · Trauma workup unremarkable aside from posterior scalp laceration as mentioned  · PT/OT evaluations pending     Laceration of head, subsequent encounter  Assessment & Plan  · Presented as a trauma alert status post fall at home  · CT head on admission without acute intracranial abnormality or hemorrhage  · Posterior head laceration repaired with 6 sutures on day of admission 03/19  · Appreciate ongoing trauma recommendations    Type 2 diabetes mellitus, without long-term current use of insulin ([place] [id number] )  Assessment & Plan  No results found for: HGBA1C    Recent Labs     03/19/21  2300 03/20/21  0624 03/20/21  1149   POCGLU 160* 138 241*       Blood Sugar Average: Last 72 hrs:  (P) 198 [id number]  · Hold oral medications while inpatient  · Noted patient on chronic steroids for dermatologic purposes, currently being weaned in the outpatient setting   · Q i d  Accu-Cheks with SSI coverage  · Diabetic diet  · Hypoglycemia protocol    VTE Pharmacologic Prophylaxis:   Pharmacologic: Apixaban (Eliquis)  Mechanical VTE Prophylaxis in Place: No    Patient Centered Rounds: I have performed bedside rounds with nursing staff today  Discussions with Specialists or Other Care Team Provider: primary RN    Education and Discussions with Family / Patient: patient and family at bedside     Time Spent for Care: 20 minutes  More than 50% of total time spent on counseling and coordination of care as described above  Current Length of Stay: 1 day(s)    Current Patient Status: Inpatient   Certification Statement: The patient will continue to require additional inpatient hospital stay due to RAYMUNDO, medication adjustments     Discharge Plan: once cleared by cardiology, anticipate 24-48 hours; pending PT/OT evaluations    Code Status: Level 1 - Full Code    Subjective:   Patient states she did not sleep last night but is declined medication to help with insomnia  Otherwise denies complaints  Specifically denie lightheadedness/dizziness, chest pain or SOB  Objective:     Vitals:   Temp (24hrs), Av 1 °F (36 7 °C), Min:97 8 °F (36 6 °C), Max:98 4 °F (36 9 °C)    Temp:  [97 8 °F (36 6 °C)-98 4 °F (36 9 °C)] 98 4 °F (36 9 °C)  HR:  [59-68] 63  Resp:  [16-20] 20  BP: ()/(47-93) 130/57  SpO2:  [84 %-98 %] 95 %  There is no height or weight on file to calculate BMI  Input and Output Summary (last 24 hours): Intake/Output Summary (Last 24 hours) at 3/20/2021 1624  Last data filed at 3/20/2021 1130  Gross per 24 hour   Intake 220 ml   Output 400 ml   Net -180 ml       Physical Exam:     Physical Exam  Vitals signs and nursing note reviewed  Constitutional:       General: She is not in acute distress  Cardiovascular:      Rate and Rhythm: Normal rate and regular rhythm  Pulses: Normal pulses  Heart sounds: No murmur  Pulmonary:      Effort: Pulmonary effort is normal  No respiratory distress  Breath sounds: No wheezing or rales  Abdominal:      General: Abdomen is flat  There is no distension  Palpations: Abdomen is soft  Tenderness:  There is no abdominal tenderness  Musculoskeletal:      Right lower leg: No edema  Left lower leg: No edema  Skin:     General: Skin is warm and dry  Coloration: Skin is not pale  Findings: No erythema  Neurological:      Mental Status: She is alert and oriented to person, place, and time  Mental status is at baseline  Additional Data:     Labs:    Results from last 7 days   Lab Units 03/20/21  0529   WBC Thousand/uL 8 08   HEMOGLOBIN g/dL 9 8*   HEMATOCRIT % 32 5*   PLATELETS Thousands/uL 214   NEUTROS PCT % 67   LYMPHS PCT % 18   MONOS PCT % 11   EOS PCT % 2     Results from last 7 days   Lab Units 03/20/21  0529   SODIUM mmol/L 141   POTASSIUM mmol/L 4 1   CHLORIDE mmol/L 111*   CO2 mmol/L 24   BUN mg/dL 23   CREATININE mg/dL 1 32*   ANION GAP mmol/L 6   CALCIUM mg/dL 8 5   GLUCOSE RANDOM mg/dL 147*     Results from last 7 days   Lab Units 03/19/21  1047   INR  1 22*     Results from last 7 days   Lab Units 03/20/21  1149 03/20/21  0624 03/19/21  2300   POC GLUCOSE mg/dl 241* 138 160*                   * I Have Reviewed All Lab Data Listed Above  * Additional Pertinent Lab Tests Reviewed:  All Labs Within Last 24 Hours Reviewed    Imaging:    Imaging Reports Reviewed Today Include: carotid ultrasound, echo  Imaging Personally Reviewed by Myself Includes:  none    Recent Cultures (last 7 days):           Last 24 Hours Medication List:   Current Facility-Administered Medications   Medication Dose Route Frequency Provider Last Rate    acetaminophen  650 mg Oral Q6H PRN Danielle E Held, [place]      amiodarone  400 mg Oral Daily Danielle E Held, PA-MELINA      apixaban  5 mg Oral BID Danielle E Held, [place]      clopidogrel  75 mg Oral Daily Danielle E Held, [place]      insulin lispro  1-5 Units Subcutaneous TID [facility] Jerome Rivera MD      insulin lispro  1-5 Units Subcutaneous HS Jerome Rivera MD      metoprolol succinate  175 mg Oral Daily Danielle E Held, [place]      ondansetron  4 mg Intravenous Q6H PRN Eloina Reining Talia, [place]      pravastatin  40 mg Oral After [employer] [place] Melgar      predniSONE  2 5 mg Oral Daily Danielle Melgar PA-C          Today, Patient Was Seen By: Stephanie Velazquez PA-C    ** Please Note: Dictation voice to text software may have been used in the creation of this document   **

## 2021-03-20 NOTE — ASSESSMENT & PLAN NOTE
· Patient with noted history of recurrent syncope/presyncope  Prior admission in July 2020 felt to 2/2 dehydration given concurrent RAYMUNDO and hyponatremia  Noted history of VT s/p ICD in July 2020  · Hypotensive on admission, BP improved s/p IVF   · ? Symptomatic carotid stenosis (see related plan)  · ICD interrogation without events   · Continue to monitor orthostatics   · Telemetry review unremarkable   · Repeat echo: Systolic function was normal  Ejection fraction was estimated to be 55 %  Although no diagnostic regional wall motion abnormality was identified, this possibility cannot be completely excluded on the basis of this study  No significant valvular abnormalities     · Appreciate cardiology consult and recommendations   · PT/OT evaluations pending

## 2021-03-20 NOTE — ASSESSMENT & PLAN NOTE
· Noted history of L ICA stent  · September 2017 R ICA with 70-79% stenosis   · Repeat carotid US with stable disease  · Continue statin, Plavix    · Outpatient follow-up

## 2021-03-20 NOTE — ED NOTES
Called all floors to check for a tele box  No tele boxes available at this time        Lauren Henry  03/19/21 2024

## 2021-03-20 NOTE — ASSESSMENT & PLAN NOTE
- patient has posterior occiput laceration  - hemostatic with on figure of eight suture and use of Quick Clot  - laceration repair yesterday - continue monitoring for signs of wound dehiscence, signs of infection, or other concerning signs   - patient had significant blood loss from scalp laceration - received 1U PRBCs in trauma bay in setting of blood loss and hypotension

## 2021-03-20 NOTE — ASSESSMENT & PLAN NOTE
- patient had a near-syncopal episode in her bathroom yesterday, causing her to fall with + head strike  - patient notes she has been feeling dizzy for months; she thought it would get better when she got her pacemaker but the patient notes she thinks it is worse now  - negative trauma imaging yesterday - admitted to the hospital medicine team for further management with trauma surgery following in the periphery

## 2021-03-20 NOTE — ASSESSMENT & PLAN NOTE
No results found for: HGBA1C    Recent Labs     03/19/21  2300 03/20/21  0624   POCGLU 160* 138       Blood Sugar Average: Last 72 hrs:  (P) 149     - management per primary team

## 2021-03-20 NOTE — ASSESSMENT & PLAN NOTE
No results found for: HGBA1C    Recent Labs     03/19/21  2300 03/20/21  0624 03/20/21  1149   POCGLU 160* 138 241*       Blood Sugar Average: Last 72 hrs:  (P) 370 [id number]  · Hold oral medications while inpatient  · Noted patient on chronic steroids for dermatologic purposes, currently being weaned in the outpatient setting   · Q i d  Accu-Cheks with SSI coverage  · Diabetic diet  · Hypoglycemia protocol

## 2021-03-20 NOTE — PLAN OF CARE
Problem: Potential for Falls  Goal: Patient will remain free of falls  Description: INTERVENTIONS:  - Assess patient frequently for physical needs  -  Identify cognitive and physical deficits and behaviors that affect risk of falls    -  [place] fall precautions as indicated by assessment   - Educate patient/family on patient safety including physical limitations  - Instruct patient to call for assistance with activity based on assessment  - Modify environment to reduce risk of injury  - Consider OT/PT consult to assist with strengthening/mobility  Outcome: Progressing     Problem: PAIN - ADULT  Goal: Verbalizes/displays adequate comfort level or baseline comfort level  Description: Interventions:  - Encourage patient to monitor pain and request assistance  - Assess pain using appropriate pain scale  - Administer analgesics based on type and severity of pain and evaluate response  - Implement non-pharmacological measures as appropriate and evaluate response  - Consider cultural and social influences on pain and pain management  - Notify physician/advanced practitioner if interventions unsuccessful or patient reports new pain  Outcome: Progressing

## 2021-03-20 NOTE — PROGRESS NOTES
[facility]  Progress Note - Karlene Kelly 1935, 81 y o  female MRN: [id number]  Unit/Bed#: CW2 213-02 Encounter: [id number]  Primary Care Provider: Mica Mccormack DO   Date and time admitted to hospital: 3/19/2021 10:23 AM    Fall  Assessment & Plan  - patient had a near-syncopal episode in her bathroom yesterday, causing her to fall with + head strike  - patient notes she has been feeling dizzy for months; she thought it would get better when she got her pacemaker but the patient notes she thinks it is worse now  - negative trauma imaging yesterday - admitted to the hospital medicine team for further management with trauma surgery following in the periphery       History of ventricular tachycardia  Assessment & Plan  - management per primary team    Laceration of head, subsequent encounter  Assessment & Plan  - patient has posterior occiput laceration  - hemostatic with on figure of eight suture and use of Quick Clot  - laceration repair yesterday - continue monitoring for signs of wound dehiscence, signs of infection, or other concerning signs   - patient had significant blood loss from scalp laceration - received 1U PRBCs in trauma bay in setting of blood loss and hypotension    Atrial fibrillation (HCC)  Assessment & Plan  - management per primary team    CAD (coronary artery disease)  Assessment & Plan  - management per primary team    Bilateral carotid artery stenosis  Assessment & Plan  - management per primary team    Type 2 diabetes mellitus, without long-term current use of insulin ([facility] )  Assessment & Plan  No results found for: HGBA1C    Recent Labs     03/19/21  2300 03/20/21  0624   POCGLU 160* 138       Blood Sugar Average: Last 72 hrs:  (P) 149     - management per primary team    History of pulmonary embolus (PE)  Assessment & Plan  - patient anticoagulated and on Plavix at home    Cardiomyopathy [facility])  Assessment & Plan  - management per primary team    RAYMUNDO (acute kidney injury) [facility])  Assessment & Plan  - management per primary team    * Near syncope  Assessment & Plan  - resulted in patient having a fall with head strike yesterday  - management per primary team    TERTIARY TRAUMA SURVEY NOTE    Prophylaxis: Eliquis    Disposition: Stable    Code status:  Level 1 - Full Code    Consultants:  · Trauma Surgery  · Hospital Medicine  · Physical Therapy  · Occupational Therapy  · Cardiology    Is the patient 72 years or older?: YES:    1  Before the illness or injury that brought you to the Emergency, did you need someone to help you on a regular basis? 0=No   2  Since the illness or injury that brought you to the Emergency, have you needed more help than usual to take care of yourself? 1=Yes   3  Have you been hospitalized for one or more nights during the past 6 months (excluding a stay in the Emergency Department)? 1=Yes   4  In general, do you see well? 1=No   5  In general, do you have serious problems with your memory? 0=No   6  Do you take more than three different medications everyday? 1=Yes   TOTAL   4     Did you order a geriatric consult if the score was 2 or greater?: yes    SUBJECTIVE:     Transfer from: N/A  Outside Films Received: no  Tertiary Exam Due on: 3/20/2021    Mechanism of Injury:Fall    Details related to Injury: -LOC, + prodromal symptoms    Chief Complaint:  I am okay  HPI/Last 24 hour events:   · This is an 72-year-old female who presented to the [facility] yesterday status post fall  The patient felt dizzy while she was in her bathroom, tried to get into her bedroom, and ended up falling on the bathroom floor striking her head on an unknown object  The patient noted immediate bleeding at that time but did not lose consciousness and did not have any seizure-like activity  When patient presented to the Trauma Service, she was hypotensive with profuse bleeding from a scalp laceration from her posterior occiput    Figure of 8 stitch and quick clot gel was used for better hemostasis of the patient's posterior occiput  The patient's trauma imaging was negative  Scalp laceration repaired  Given the prodromal symptoms of her fall and recent pacemaker placement, the patient was admitted to the medicine service for pacemaker and further cardiologic examination  · On my exam today, the patient endorses no complaints  She is having some mild posterior occipital tenderness but otherwise is well  She denies visual disturbances, chest pain, shortness of breath, palpitations, nausea, vomiting, diarrhea, constipation, abdominal pain, dysuria, and lower extremity swelling/pain  She denies other complaints at this time  Active medications:           Current Facility-Administered Medications:     acetaminophen (TYLENOL) tablet 650 mg, 650 mg, Oral, Q6H PRN    amiodarone tablet 400 mg, 400 mg, Oral, Daily    apixaban (ELIQUIS) tablet 5 mg, 5 mg, Oral, BID, 5 mg at 03/19/21 2043    aspirin (ECOTRIN LOW STRENGTH) EC tablet 81 mg, 81 mg, Oral, Daily    clopidogrel (PLAVIX) tablet 75 mg, 75 mg, Oral, Daily    insulin lispro (HumaLOG) 100 units/mL subcutaneous injection 1-5 Units, 1-5 Units, Subcutaneous, TID AC **AND** Fingerstick Glucose (POCT), , , TID AC    insulin lispro (HumaLOG) 100 units/mL subcutaneous injection 1-5 Units, 1-5 Units, Subcutaneous, HS, 1 Units at 03/19/21 2312    metoprolol succinate (TOPROL-XL) 24 hr tablet 175 mg, 175 mg, Oral, Daily    ondansetron (ZOFRAN) injection 4 mg, 4 mg, Intravenous, Q6H PRN    pravastatin (PRAVACHOL) tablet 40 mg, 40 mg, Oral, After Dinner    predniSONE tablet 2 5 mg, 2 5 mg, Oral, Daily    sodium chloride 0 9 % infusion, 75 mL/hr, Intravenous, Continuous, 75 mL/hr at 03/19/21 1938    OBJECTIVE:     Vitals:   Vitals:    03/20/21 0739   BP: (!) 114/49   Pulse: 60   Resp: 16   Temp: 98 4 °F (36 9 °C)   SpO2: 90%     Physical Exam:   Physical Exam  Vitals signs and nursing note reviewed  Constitutional:       General: She is not in acute distress  Appearance: Normal appearance  She is normal weight  She is not ill-appearing, toxic-appearing or diaphoretic  HENT:      Head: Normocephalic  Comments: Scalp laceration to posterior occiput  Appears well healing at this time; no signs of infection; no wound dehiscence; no purulence, lymphangitic streaking, fluctuance, or redness noted  The remainder of the head is atraumatic     Ears:      Comments: Negative olivas sign bilaterally     Nose: Nose normal       Comments: Atraumatic     Mouth/Throat:      Mouth: Mucous membranes are moist       Comments: Atraumatic  Eyes:      General: No scleral icterus  Right eye: No discharge  Left eye: No discharge  Extraocular Movements: Extraocular movements intact  Conjunctiva/sclera: Conjunctivae normal       Comments: Atraumatic  Negative raccoon eyes   Neck:      Musculoskeletal: Normal range of motion  Comments: Normal range of motion of neck grossly  No cervical spine or paracervical musculature tenderness to palpation  No cervical, thoracic, or lumbar spinal tenderness to palpation; no step-offs or deformities  Cardiovascular:      Rate and Rhythm: Normal rate and regular rhythm  Pulses: Normal pulses  Heart sounds: Normal heart sounds  No murmur  No friction rub  No gallop  Comments: 2+ radial and DP pulses bilaterally  Pulmonary:      Effort: Pulmonary effort is normal  No respiratory distress  Breath sounds: Normal breath sounds  No stridor  No wheezing, rhonchi or rales  Chest:      Chest wall: No tenderness  Abdominal:      Tenderness: There is no right CVA tenderness or left CVA tenderness        Comments: Soft, nontender, nondistended, and without organomegaly   Musculoskeletal:      Comments: Normal range of motion of bilateral upper and lower extremities  No tenderness to palpation of bilateral upper and lower extremities  No clavicular, pelvic, or chest wall tenderness to palpation  No pelvic instability  Negative Wm sign bilaterally   Skin:     General: Skin is warm and dry  Capillary Refill: Capillary refill takes less than 2 seconds  Coloration: Skin is not jaundiced or pale  Neurological:      General: No focal deficit present  Mental Status: She is alert and oriented to person, place, and time  Mental status is at baseline  Comments: 5/5 strength in bilateral upper and lower extremities  Sensation is equal and intact in bilateral upper and lower extremities  No stroke-like symptoms  Patient able to puff out cheeks, raise eyebrows, smile, and frown bilaterally without difficulty  Negative pronator drift  Alert and oriented to person place and time  GCS 15   Psychiatric:         Mood and Affect: Mood normal          Behavior: Behavior normal        I/O:   I/O       03/18 0701 - 03/19 0700 03/19 0701 - 03/20 0700 03/20 0701 - 03/21 0700    Blood  350     Total Intake(mL/kg)  350 (5 8)     Urine (mL/kg/hr)  400     Total Output  400     Net  -50                Invasive Devices: Invasive Devices     Peripheral Intravenous Line            Peripheral IV 03/19/21 Left Forearm less than 1 day    Peripheral IV 03/19/21 Right Wrist less than 1 day              Imaging:   Trauma - Ct Head Wo Contrast    Result Date: 3/19/2021  Impression: No acute intracranial abnormality  I personally discussed this study with David Ceja on 3/19/2021 at 11:21 AM  Workstation performed: [id number]     Trauma - Ct Spine Cervical Wo Contrast    Result Date: 3/19/2021  Impression: No cervical spine fracture or traumatic malalignment  I personally discussed this study with David Ceja on 3/19/2021 at 11:21 AM   Workstation performed: [id number]     Xr Trauma Multiple    Result Date: 3/19/2021  Impression: No acute cardiopulmonary disease within limitations of supine imaging    Workstation performed: [id number]     Labs:   CBC: Lab Results   Component Value Date    WBC 8 17 03/19/2021    HGB 12 1 03/19/2021    HCT 39 0 03/19/2021    MCV 88 03/19/2021     03/19/2021    MCH 27 4 03/19/2021    MCHC 31 0 (L) 03/19/2021    RDW 15 4 (H) 03/19/2021    MPV 11 0 03/19/2021    NRBC 0 03/19/2021     CMP:   Lab Results   Component Value Date     (H) 03/20/2021    CO2 24 03/20/2021    CO2 26 03/19/2021    BUN 23 03/20/2021    CREATININE 1 32 (H) 03/20/2021    GLUCOSE 163 (H) 03/19/2021    CALCIUM 8 5 03/20/2021    EGFR 37 03/20/2021

## 2021-03-20 NOTE — ASSESSMENT & PLAN NOTE
· Trauma workup unremarkable aside from posterior scalp laceration as mentioned  · PT/OT evaluations pending

## 2021-03-20 NOTE — ASSESSMENT & PLAN NOTE
· POA with creatinine 1 50, baseline creatinine approximately 0 7-0 9  · Suspect due to hypotension and diuretic use   · Creatinine improved to 1 35 today s/p gentle IVF resuscitation   · Per cardiology, discontinue IVF and continue to hold Lasix for now  · Avoid nephrotoxins or further hypotension  · Monitor BMP

## 2021-03-20 NOTE — UTILIZATION REVIEW
Initial Clinical Review    Admission: Date/Time/Statement:   Admission Orders (From admission, onward)     Ordered        03/19/21 1728  Inpatient Admission  Once                   Orders Placed This Encounter   Procedures    Inpatient Admission     Standing Status:   Standing     Number of Occurrences:   1     Order Specific Question:   Level of Care     Answer:   Med Surg [16]     Order Specific Question:   Estimated length of stay     Answer:   More than 2 Midnights     Order Specific Question:   Certification     Answer:   I certify that inpatient services are medically necessary for this patient for a duration of greater than two midnights  See H&P and MD Progress Notes for additional information about the patient's course of treatment  ED Arrival Information     Expected Arrival Acuity Means of Arrival Escorted By Service Admission Type    - 3/19/2021 10:23 Emergent Ambulance [facility] [address] Complaint    fall        Chief Complaint   Patient presents with    Trauma     Patient coming from home, fell while walking to bathroom + head strike + plavix and eliquis     Assessment/Plan:  80 y/o female with PMHx of hypertrophic cardiomyopathy, V-tach s/p ICD, A-fib and PE on Eliquis, b/l carotid artery stenosis, HTN, HLD, steroid induced hyperglycemia, who presents to ED via EMS with near syncope leading to fall with head trauma  Arrived as a trauma alert with neg w/u in ED except for scalp lac with was sutured  Admitted inpatient under medical service to M/S/Tele unit with near syncope  Telem monitoring  ICD interrogation  Orthostatic BP's  Echo  Consult cardiology  Cr 1 50 (baseline 0 7-0 9) suspect d/t hypotension  Hold lasix for now  Avoid nephrotoxins  Gentle IVF's  Monitor BMP  Continue home dose amiodarone 400 mg daily, Toprol- mg daily, aspirin, statin, Plavix, beta-blocker Eliquis  Fingerstick glucose checks  GI consult      Cardiology consult 3/20 -- Presyncope  -differentials to consider:               Arrhythmia (atrial, ventricular): no events on device interrogation  Ischemic:  Troponin/EKG is negative for ischemia  Structural:  Will follow up on echo  Patient with history of apical HCM thus sensitive to dehydration  Currently getting IV fluids  Device interrogation with Opti volume showed fluid accumulation  Will hold the fluids at this time  Will hold the Lasix at this time given that she is euvolemic and RAYMUNDO  Will assess fluid status daily in order to decide when to restart Lasix  Follow-up on orthostatic vitals  Patient with high-grade stenosis of carotid arteries, which could be contributing to the symptoms  Will recommend to f/u with her vascular team  Continue other previous po meds  3/20 -- creatinine 1 35 today  S/p IVF"s  D/c IVF's per cardiology  PT/OT evals  Continue to hold lasix and monitor         ED Triage Vitals   Temperature Pulse Respirations Blood Pressure SpO2   03/19/21 1030 03/19/21 1025 03/19/21 1025 03/19/21 1025 03/19/21 1025   97 9 °F (36 6 °C) 81 18 (!) 185/81 98 %      Temp Source Heart Rate Source Patient Position - Orthostatic VS BP Location FiO2 (%)   03/19/21 1030 03/19/21 1025 03/19/21 1025 03/19/21 2030 --   Oral Monitor Lying Right arm       Pain Score       03/19/21 1230       5          Wt Readings from Last 1 Encounters:   03/19/21 60 7 kg (133 lb 13 1 oz)     Additional Vital Signs:   Date/Time  Temp  Pulse  Resp  BP  MAP (mmHg)  SpO2  Calculated FIO2 (%) - Nasal Cannula  Nasal Cannula O2 Flow Rate (L/min)  O2 Device  Patient Position - Orthostatic VS   03/20/21 15:22:37  --  63  20  130/57  81  95 %  --  --  --  --   03/20/21 07:39:15  98 4 °F (36 9 °C)  60  16  114/49Abnormal   71  90 %  --  --  Nasal cannula  Lying   03/20/21 0435  --  60  --  --  --  84 %Abnormal   --  --  None (Room air)  --   03/20/21 03:16:02  98 1 °F (36 7 °C)  64  18  115/47Abnormal 70  96 %  --  --  --  --   03/19/21 23:41:04  97 8 °F (36 6 °C)  60  18  113/93  100  96 %  --  --  --  --   03/19/21 2300  --  --  --  --  --  --  --  --  None (Room air)  --   03/19/21 1200  --  60  18  131/61  --  100 %  --  --  --  --   03/19/21 1130  --  60  14  119/69  --  100 %   --  --  --  --   SpO2: 2L NC at 03/19/21 1130   03/19/21 11:00:28  --  60  18  100/50  --  99 %  --  --  --  Lying   03/19/21 1055  --  60  18  69/39Abnormal   --  99 %  --  --  --  Lying   03/19/21 10:51:37  --  66  18  75/43Abnormal   --  99 %  --  --  None (Room air)  --   03/19/21 1050  --  62  18  75/43Abnormal   --  96 %  --  --  --  Lying   03/19/21 1045  --  59  18  64/38Abnormal   --  93 %  --  --  --  Lying   03/19/21 1040  --  49Abnormal   18  50/31Abnormal   --  97 %  --  --  --  Lying   03/19/21 1035  --  61  18  141/70  --  95 %  --  --  --  Lying   03/19/21 1030  97 9 °F (36 6 °C)  62  18  163/75  --  97 %  --  --  --  Lying   03/19/21 1025  --  81  18  185/81Abnormal   --  98 %  --  --  --  Lying       Pertinent Labs/Diagnostic Test Results:      CT brain 3/19 -- No acute intracranial abnormality  CT head 3/19 --   CT c-spine 3/19 -- No cervical spine fracture or traumatic malalignment       Results from last 7 days   Lab Units 03/20/21  0529 03/19/21  1047 03/19/21  1033   WBC Thousand/uL 8 08 8 17  --    HEMOGLOBIN g/dL 9 8* 12 1  --    I STAT HEMOGLOBIN g/dl  --   --  12 2   HEMATOCRIT % 32 5* 39 0  --    HEMATOCRIT, ISTAT %  --   --  36   PLATELETS Thousands/uL 214 328  --    NEUTROS ABS Thousands/µL 5 47 4 74  --      Results from last 7 days   Lab Units 03/20/21  0529 03/19/21  1047 03/19/21  1033   SODIUM mmol/L 141 142  --    POTASSIUM mmol/L 4 1 3 8  --    CHLORIDE mmol/L 111* 107  --    CO2 mmol/L 24 25  --    CO2, I-STAT mmol/L  --   --  26   ANION GAP mmol/L 6 10  --    BUN mg/dL 23 22  --    CREATININE mg/dL 1 32* 1 50*  --    EGFR ml/min/1 73sq m 37 32  --    CALCIUM mg/dL 8 5 9 7  --    MAGNESIUM mg/dL 2 0  --   --      Results from last 7 days   Lab Units 03/20/21  1632 03/20/21  1149 03/20/21  0624 03/19/21  2300   POC GLUCOSE mg/dl 163* 241* 138 160*     Results from last 7 days   Lab Units 03/20/21  0529 03/19/21  1047   GLUCOSE RANDOM mg/dL 147* 161*     Results from last 7 days   Lab Units 03/19/21  1033   PH, MARYAN I-STAT  7 379   PCO2, MARYAN ISTAT mm HG 42 8   PO2, MARYAN ISTAT mm HG 19 0*   HCO3, MARYAN ISTAT mmol/L 25 2   I STAT BASE EXC mmol/L 0   I STAT O2 SAT % 29*     Results from last 7 days   Lab Units 03/19/21  1047   TROPONIN I ng/mL <0 02     Results from last 7 days   Lab Units 03/19/21  1047   PROTIME seconds 15 4*   INR  1 22*   PTT seconds 25       ED Treatment:   Medication Administration from 03/19/2021 1016 to 03/19/2021 2230       Date/Time Order Dose Route Action     03/19/2021 1043 multi-electrolyte (ISOLYTE-S PH 7 4) bolus 1,000 mL Intravenous New Bag     03/19/2021 1150 tetanus-diphtheria-acellular pertussis (BOOSTRIX) IM injection 0 5 mL 0 5 mL Intramuscular Given     03/19/2021 1150 lidocaine-epinephrine (XYLOCAINE/EPINEPHRINE) 1 %-1:100,000 injection 10 mL 10 mL Infiltration Given by Other     03/19/2021 1938 sodium chloride 0 9 % infusion 75 mL/hr Intravenous New Bag     03/19/2021 2043 apixaban (ELIQUIS) tablet 5 mg 5 mg Oral Given     Past Medical History:   Diagnosis Date    Diabetes mellitus ([facility] [facility] )     Hypertension      Admitting Diagnosis: Near syncope [R55]  Fall, initial encounter [W19  XXXA]  Laceration of scalp, initial encounter [S01 01XA]  Other injury of unspecified body region, initial encounter [T14  8XXA]  Age/Sex: 81 y o  female  Admission Orders:  Scheduled Medications:  amiodarone, 400 mg, Oral, Daily  apixaban, 5 mg, Oral, BID  clopidogrel, 75 mg, Oral, Daily  insulin lispro, 1-5 Units, Subcutaneous, TID AC  insulin lispro, 1-5 Units, Subcutaneous, HS  metoprolol succinate, 175 mg, Oral, Daily  pravastatin, 40 mg, Oral, After Dinner  predniSONE, 2 5 mg, Oral, Daily    multi-electrolyte (ISOLYTE-S PH 7 4) bolus   Freq: Code/trauma/sedation continuous med Route: IV  Start: 03/19/21 1043 End: 03/19/21 1043     PRN Meds:  acetaminophen, 650 mg, Oral, Q6H PRN  ondansetron, 4 mg, Intravenous, Q6H PRN        IP CONSULT TO CARDIOLOGY  IP CONSULT TO GERONTOLOGY    Network Utilization Review Department  ATTENTION: Please call with any questions or concerns to [phone] and carefully listen to the prompts so that you are directed to the right person  All voicemails are confidential   Gianni Gaines all requests for admission clinical reviews, approved or denied determinations and any other requests to dedicated fax number below belonging to the campus where the patient is receiving treatment   List of dedicated fax numbers for the Facilities:  [address] DENIALS (Administrative/Medical Necessity) [phone]   [address] (Maternity/NICU/Pediatrics) [phone]   [address] [place] [address] [address] [place] [address] [address] ([facility]) [address] [place] [address] Michelle Hallman [address] [place] [address] [phone]

## 2021-03-20 NOTE — PLAN OF CARE
Problem: Potential for Falls  Goal: Patient will remain free of falls  Description: INTERVENTIONS:  - Assess patient frequently for physical needs  -  Identify cognitive and physical deficits and behaviors that affect risk of falls    -  [place] fall precautions as indicated by assessment   - Educate patient/family on patient safety including physical limitations  - Instruct patient to call for assistance with activity based on assessment  - Modify environment to reduce risk of injury  - Consider OT/PT consult to assist with strengthening/mobility  Outcome: Progressing     Problem: PAIN - ADULT  Goal: Verbalizes/displays adequate comfort level or baseline comfort level  Description: Interventions:  - Encourage patient to monitor pain and request assistance  - Assess pain using appropriate pain scale  - Administer analgesics based on type and severity of pain and evaluate response  - Implement non-pharmacological measures as appropriate and evaluate response  - Consider cultural and social influences on pain and pain management  - Notify physician/advanced practitioner if interventions unsuccessful or patient reports new pain  Outcome: Progressing     Problem: SAFETY ADULT  Goal: Maintain or return to baseline ADL function  Description: INTERVENTIONS:  -  Assess patient's ability to carry out ADLs; assess patient's baseline for ADL function and identify physical deficits which impact ability to perform ADLs (bathing, care of mouth/teeth, toileting, grooming, dressing, etc )  - Assess/evaluate cause of self-care deficits   - Assess range of motion  - Assess patient's mobility; develop plan if impaired  - Assess patient's need for assistive devices and provide as appropriate  - Encourage maximum independence but intervene and supervise when necessary  - Involve family in performance of ADLs  - Assess for home care needs following discharge   - Consider OT consult to assist with ADL evaluation and planning for discharge  - Provide patient education as appropriate  Outcome: Progressing  Goal: Maintain or return mobility status to optimal level  Description: INTERVENTIONS:  - Assess patient's baseline mobility status (ambulation, transfers, stairs, etc )    - Identify cognitive and physical deficits and behaviors that affect mobility  - Identify mobility aids required to assist with transfers and/or ambulation (gait belt, sit-to-stand, lift, walker, cane, etc )  - [place] fall precautions as indicated by assessment  - Record patient progress and toleration of activity level on Mobility SBAR; progress patient to next Phase/Stage  - Instruct patient to call for assistance with activity based on assessment  - Consider rehabilitation consult to assist with strengthening/weightbearing, etc   Outcome: Progressing

## 2021-03-20 NOTE — CONSULTS
Consultation - Cardiology  Fransico Edwards 81 y o  female MRN: [id number]  Unit/Bed#: [facility]le Ch[facility] 716-03 Encounter: [id number]            Inpatient consult to Cardiology     Performed by  Jenn Perez DO     Authorized by Mya Devlin PA-C            History of Present Illness   Physician Requesting Consult: Laverne Corona DO  Reason for Consult / Principal Problem:  Presyncope    Assessment/Plan    Presyncope  -differentials to consider:    Arrhythmia (atrial, ventricular): no events on device interrogation  Ischemic:  Troponin/EKG is negative for ischemia  Structural:  Will follow up on echo  Patient with history of apical HCM thus sensitive to dehydration  Currently getting IV fluids  Device interrogation with Opti volume showed fluid accumulation  Will hold the fluids at this time  Will hold the Lasix at this time given that she is euvolemic and RAYMUNDO  Will assess fluid status daily in order to decide when to restart Lasix  Will also follow-up on orthostatic vitals  Patient with high-grade stenosis of carotid arteries, which could be contributing to the symptoms  Will recommend to f/u with her vascular team      Atrial fibrillation/ventricular tachycardia  -continue with amiodarone 400 mg q d , Eliquis 5 mg b i d , metoprolol succinate 175 mg q d  Patient current dose of Eliquis is 5 mg b i d  Her weight is about 61 kg and her age is 81-year-old thus might consider to decrease the dose to 2 5 mg b i d  CAD  -continue with Plavix 75 mg q d , continue with Eliquis 5 mg b i d   Patient is not on aspirin as outpatient thus will stop it   -continue with pravastatin 40 mg q d  History for CHF with reduced EF  -currently euvolemic  Will hold Lasix for now  Will assess fluid status daily    -follow-up echo    HPI: Fransico Edwards is a 81y o  year old female with past medical history of apical HCM, pulmonary embolism (June of 2020), paroxysmal atrial fibrillation, coronary artery disease (status post stent in LADx2 in 2018, and 1 stent in mid LAD for In Stent restenosis in July of 2020), carotid disease (right ICA more than 70 % stenosis, left ICA with stent), diabetes mellitus type 2, hypertension, hyperlipidemia, history of VT (status post ICD in July of 2020) presented with presyncopal episode  Patient states that she got up in the morning to go to the bathroom, felt lightheaded, fell down, without syncope, got herself to the bathroom, and called ambulance after that  She denies chest pain, palpitation, shortness of breath prior to the presyncope  She denies neurologic deficits associated with presyncope  She was found to have a head laceration for which required stitches  Patient had similar episodes twice last year with with 1 of episode associated with VT, and the 2nd episode being at attributed to dehydration  Patient takes Lasix 40 mg every other day at home  Denies decreased p o  Intake  Vitals were significant for hypertension, with subsequent hypotension, with subsequent improvement in the blood pressure  Labs significant for negative troponin x1, creatinine 1 5 with improvement to 1 32 (baseline creatinine 0 8-1)  Trauma imaging is negative for acute finding  Carotid ultrasound showed more than 70% stenosis in the right internal carotid artery, and stent in the left internal carotid artery being patent  Patient Lasix were held and she was started on normal saline 75 cc/hour  Primary Cardiologist:   Dr Lidia Bloom at [facility]:  Normal sinus rhythm with occasional a-paced rhythm    Cardiac testing:   ECHO:  6/2020  SUMMARY     LEFT VENTRICLE:  Systolic function was at the lower limits of normal  Ejection fraction was estimated to be 50 %    There were no regional wall motion abnormalities      RIGHT VENTRICLE:  The size was normal   Systolic function was mildly reduced      LEFT ATRIUM:  The atrium was mildly to moderately dilated      ATRIAL SEPTUM:  There was a secundum septal defect  Doppler evaluation was performed  There was a left-to-right shunt      RIGHT ATRIUM:  The atrium was mildly dilated      MITRAL VALVE:  There was mild regurgitation      AORTIC VALVE:  There was mild regurgitation      TRICUSPID VALVE:  There was mild regurgitation  CATH:  Cath 7/14/20:  Diagnostic   Dominance: Right   Left Main   The vessel was visualized by angiography, is moderate in size and is angiographically normal    Left Anterior Descending   Mid LAD lesion is 90% stenosed  Not the culprit lesion  Lesion length: discrete (<10mm)  CLARISA flow is 3  The lesion is concentric, discrete and smooth  The lesion was previously treated  Previous treatment took place >2 years ago  Ultrasound (IVUS) was performed  IVUS has determined that the lesion is calcified  Left Circumflex   Second Obtuse Marginal Branch   2nd Mrg lesion is 70% stenosed  Not the culprit lesion  Lesion length: diffuse (>20mm)  CLARISA flow is 3  The lesion is eccentric and irregular  Right Coronary Artery   Collaterals   Dist RCA filled by collaterals from Mid LAD  Prox RCA lesion is 100% stenosed  Not the culprit lesion  CLARISA flow is 0       Mid LAD lesion   Stent   Drug-eluting stent was successfully placed  The stent used was a eoSemiT SYNERGY MR 8 [id number]     Review of Systems  ROS as noted above, otherwise 12 point review of systems was performed and is negative  Historical Information   Past Medical History:   Diagnosis Date    Diabetes mellitus ([address] )     Hypertension      History reviewed  No pertinent surgical history    Social History     Substance and Sexual Activity   Alcohol Use Not Currently     Social History     Substance and Sexual Activity   Drug Use Never     Social History     Tobacco Use   Smoking Status Never Smoker   Smokeless Tobacco Never Used     Family History: non-contributory    Meds/Allergies   Hospital Medications:   Current Facility-Administered Medications   Medication Dose Route Frequency    acetaminophen (TYLENOL) tablet 650 mg  650 mg Oral Q6H PRN    amiodarone tablet 400 mg  400 mg Oral Daily    apixaban (ELIQUIS) tablet 5 mg  5 mg Oral BID    aspirin (ECOTRIN LOW STRENGTH) EC tablet 81 mg  81 mg Oral Daily    clopidogrel (PLAVIX) tablet 75 mg  75 mg Oral Daily    insulin lispro (HumaLOG) 100 units/mL subcutaneous injection 1-5 Units  1-5 Units Subcutaneous TID AC    insulin lispro (HumaLOG) 100 units/mL subcutaneous injection 1-5 Units  1-5 Units Subcutaneous HS    metoprolol succinate (TOPROL-XL) 24 hr tablet 175 mg  175 mg Oral Daily    ondansetron (ZOFRAN) injection 4 mg  4 mg Intravenous Q6H PRN    pravastatin (PRAVACHOL) tablet 40 mg  40 mg Oral After Dinner    predniSONE tablet 2 5 mg  2 5 mg Oral Daily    sodium chloride 0 9 % infusion  75 mL/hr Intravenous Continuous     Home Medications:   Medications Prior to Admission   Medication    amiodarone (PACERONE) 400 MG tablet    apixaban (Eliquis) 5 mg    aspirin (ECOTRIN LOW STRENGTH) 81 mg EC tablet    clopidogrel (PLAVIX) 75 mg tablet    furosemide (LASIX) 20 mg tablet    metFORMIN (GLUCOPHAGE) 500 mg tablet    metoprolol succinate (TOPROL-XL) 100 mg 24 hr tablet    potassium chloride (K-DUR,KLOR-CON) 10 mEq tablet    predniSONE 2 5 mg tablet    simvastatin (ZOCOR) 20 mg tablet       Allergies   Allergen Reactions    Losartan Anaphylaxis    Sulfa Antibiotics Anaphylaxis       Objective   Vitals: Blood pressure (!) 114/49, pulse 60, temperature 98 4 °F (36 9 °C), temperature source Oral, resp  rate 16, weight 60 7 kg (133 lb 13 1 oz), SpO2 90 %    Orthostatic Blood Pressures      Most Recent Value   Blood Pressure  (!) 114/49 filed at 03/20/2021 0739   Patient Position - Orthostatic VS  Lying filed at 03/20/2021 0739            Intake/Output Summary (Last 24 hours) at 3/20/2021 1124  Last data filed at 3/20/2021 0739  Gross per 24 hour   Intake 450 ml   Output 400 ml   Net 50 ml       Invasive Devices     Peripheral Intravenous Line            Peripheral IV 03/19/21 Left Forearm 1 day    Peripheral IV 03/19/21 Right Wrist 1 day              Physical Exam   GEN: NAD, alert and oriented, well appearing  SKIN: dry without significant lesions or rashes  HEENT: NCAT, PERRL, EOMs intact  NECK: No JVD or carotid bruits appreciated  CARDIOVASCULAR: RRR, normal S1, S2 without murmurs, rubs, or gallops appreciated  LUNGS: Clear to auscultation bilaterally without wheezes, rhonchi, or rales  ABDOMEN: Soft, nontender, nondistended  EXTREMITIES/VASCULAR: perfused without clubbing, cyanosis, trace edema b/l  PSYCH: Normal mood and affect  NEURO: CN ll-Xll grossly intact    Lab Results: I have personally reviewed pertinent lab results  Results from last 7 days   Lab Units 03/20/21  0529 03/19/21  1047 03/19/21  1033   WBC Thousand/uL 8 08 8 17  --    HEMOGLOBIN g/dL 9 8* 12 1  --    I STAT HEMOGLOBIN g/dl  --   --  12 2   HEMATOCRIT % 32 5* 39 0  --    HEMATOCRIT, ISTAT %  --   --  36   PLATELETS Thousands/uL 214 328  --      Results from last 7 days   Lab Units 03/20/21  0529 03/19/21  1047 03/19/21  1033   POTASSIUM mmol/L 4 1 3 8  --    CHLORIDE mmol/L 111* 107  --    CO2 mmol/L 24 25  --    CO2, I-STAT mmol/L  --   --  26   BUN mg/dL 23 22  --    CREATININE mg/dL 1 32* 1 50*  --    GLUCOSE, ISTAT mg/dl  --   --  163*   CALCIUM mg/dL 8 5 9 7  --      Results from last 7 days   Lab Units 03/19/21  1047   INR  1 22*   PTT seconds 25     Results from last 7 days   Lab Units 03/20/21  0529   MAGNESIUM mg/dL 2 0     Imaging: I have personally reviewed pertinent reports

## 2021-03-21 PROBLEM — D64.9 ANEMIA: Status: ACTIVE | Noted: 2021-03-21

## 2021-03-21 LAB
ANION GAP SERPL CALCULATED.3IONS-SCNC: 6 MMOL/L (ref 4–13)
BASOPHILS # BLD AUTO: 0.04 THOUSANDS/ΜL (ref 0–0.1)
BASOPHILS NFR BLD AUTO: 1 % (ref 0–1)
BUN SERPL-MCNC: 18 MG/DL (ref 5–25)
CALCIUM SERPL-MCNC: 8.4 MG/DL (ref 8.3–10.1)
CHLORIDE SERPL-SCNC: 113 MMOL/L (ref 100–108)
CO2 SERPL-SCNC: 24 MMOL/L (ref 21–32)
CREAT SERPL-MCNC: 1.23 MG/DL (ref 0.6–1.3)
EOSINOPHIL # BLD AUTO: 0.28 THOUSAND/ΜL (ref 0–0.61)
EOSINOPHIL NFR BLD AUTO: 3 % (ref 0–6)
ERYTHROCYTE [DISTWIDTH] IN BLOOD BY AUTOMATED COUNT: 15.8 % (ref 11.6–15.1)
GFR SERPL CREATININE-BSD FRML MDRD: 40 ML/MIN/1.73SQ M
GLUCOSE SERPL-MCNC: 133 MG/DL (ref 65–140)
GLUCOSE SERPL-MCNC: 136 MG/DL (ref 65–140)
GLUCOSE SERPL-MCNC: 142 MG/DL (ref 65–140)
GLUCOSE SERPL-MCNC: 146 MG/DL (ref 65–140)
GLUCOSE SERPL-MCNC: 261 MG/DL (ref 65–140)
HCT VFR BLD AUTO: 29.1 % (ref 34.8–46.1)
HGB BLD-MCNC: 9.2 G/DL (ref 11.5–15.4)
IMM GRANULOCYTES # BLD AUTO: 0.07 THOUSAND/UL (ref 0–0.2)
IMM GRANULOCYTES NFR BLD AUTO: 1 % (ref 0–2)
LYMPHOCYTES # BLD AUTO: 1.44 THOUSANDS/ΜL (ref 0.6–4.47)
LYMPHOCYTES NFR BLD AUTO: 18 % (ref 14–44)
MCH RBC QN AUTO: 28 PG (ref 26.8–34.3)
MCHC RBC AUTO-ENTMCNC: 31.6 G/DL (ref 31.4–37.4)
MCV RBC AUTO: 88 FL (ref 82–98)
MONOCYTES # BLD AUTO: 0.91 THOUSAND/ΜL (ref 0.17–1.22)
MONOCYTES NFR BLD AUTO: 11 % (ref 4–12)
NEUTROPHILS # BLD AUTO: 5.5 THOUSANDS/ΜL (ref 1.85–7.62)
NEUTS SEG NFR BLD AUTO: 66 % (ref 43–75)
NRBC BLD AUTO-RTO: 0 /100 WBCS
PLATELET # BLD AUTO: 204 THOUSANDS/UL (ref 149–390)
PMV BLD AUTO: 11.1 FL (ref 8.9–12.7)
POTASSIUM SERPL-SCNC: 4 MMOL/L (ref 3.5–5.3)
RBC # BLD AUTO: 3.29 MILLION/UL (ref 3.81–5.12)
SODIUM SERPL-SCNC: 143 MMOL/L (ref 136–145)
WBC # BLD AUTO: 8.24 THOUSAND/UL (ref 4.31–10.16)

## 2021-03-21 PROCEDURE — 82948 REAGENT STRIP/BLOOD GLUCOSE: CPT

## 2021-03-21 PROCEDURE — 99232 SBSQ HOSP IP/OBS MODERATE 35: CPT | Performed by: PHYSICIAN ASSISTANT

## 2021-03-21 PROCEDURE — 99232 SBSQ HOSP IP/OBS MODERATE 35: CPT | Performed by: INTERNAL MEDICINE

## 2021-03-21 PROCEDURE — 97163 PT EVAL HIGH COMPLEX 45 MIN: CPT

## 2021-03-21 PROCEDURE — 85025 COMPLETE CBC W/AUTO DIFF WBC: CPT | Performed by: PHYSICIAN ASSISTANT

## 2021-03-21 PROCEDURE — 80048 BASIC METABOLIC PNL TOTAL CA: CPT | Performed by: PHYSICIAN ASSISTANT

## 2021-03-21 PROCEDURE — 97166 OT EVAL MOD COMPLEX 45 MIN: CPT

## 2021-03-21 RX ADMIN — PREDNISONE 2.5 MG: 2.5 TABLET ORAL at 09:33

## 2021-03-21 RX ADMIN — APIXABAN 5 MG: 5 TABLET, FILM COATED ORAL at 17:23

## 2021-03-21 RX ADMIN — PRAVASTATIN SODIUM 40 MG: 40 TABLET ORAL at 17:23

## 2021-03-21 RX ADMIN — APIXABAN 5 MG: 5 TABLET, FILM COATED ORAL at 09:33

## 2021-03-21 RX ADMIN — METOPROLOL SUCCINATE 175 MG: 100 TABLET, EXTENDED RELEASE ORAL at 09:32

## 2021-03-21 RX ADMIN — AMIODARONE HYDROCHLORIDE 400 MG: 200 TABLET ORAL at 09:32

## 2021-03-21 RX ADMIN — INSULIN LISPRO 2 UNITS: 100 INJECTION, SOLUTION INTRAVENOUS; SUBCUTANEOUS at 11:45

## 2021-03-21 RX ADMIN — CLOPIDOGREL BISULFATE 75 MG: 75 TABLET ORAL at 09:32

## 2021-03-21 NOTE — ASSESSMENT & PLAN NOTE
· Hemoglobin on admission 12 1 -> 9 2 -> 9 2 today   · Patient had significant blood loss from scalp laceration, received 1U PRBCs in trauma bay in setting of blood loss and hypotension  · Also consider component of hemodilution in the setting of fluid resuscitation on admission   · CTH on admission without intracranial hemorrhage   · Monitor closely

## 2021-03-21 NOTE — ASSESSMENT & PLAN NOTE
No results found for: HGBA1C    Recent Labs     03/20/21  1149 03/20/21  1632 03/20/21  2052 03/21/21  0511   POCGLU 241* 163* 113 142*       Blood Sugar Average: Last 72 hrs:  (P) 159 5  · Hold oral medications while inpatient  · Noted patient on chronic steroids for dermatologic purposes, currently being weaned in the outpatient setting   · Q i d  Accu-Cheks with SSI coverage  · Diabetic diet  · Hypoglycemia protocol

## 2021-03-21 NOTE — CASE MANAGEMENT
Prema Santiago accepted with [facility] 3/25  Danielle Melgar PA-C aware of same

## 2021-03-21 NOTE — CASE MANAGEMENT
LISHA was informed by [facility] [place] that pt is a tentative dc for tomorrow, 3/22  Plan is for pt to stay with her daughter and son in-law at dc  Pt's son in-law is retired and can provide 24/7 supervision       CM spoke to PT/OT pt to be seen in AM to trial stair and complete cog eval

## 2021-03-21 NOTE — PROGRESS NOTES
[address] [facility]  Progress Note - Rebeccane Escondido 1935, 81 y o  female MRN: [id number]  Unit/Bed#: CW2 213-02 Encounter: [id number]  Primary Care Provider: Loli Gaines DO   Date and time admitted to hospital: 3/19/2021 10:23 AM    * Near syncope  Assessment & Plan  · Patient with noted history of recurrent syncope/presyncope  Prior admission in July 2020 felt to 2/2 dehydration given concurrent RAYMUNDO and hyponatremia  Noted history of VT s/p ICD in July 2020  · Hypotensive on admission, BP improved s/p IVF   · ? Symptomatic carotid stenosis (see related plan)  · ICD interrogation without events   · Orthostatics negative     · Telemetry review unremarkable and has been discontinued   · Repeat echo: Systolic function was normal  Ejection fraction was estimated to be 55 %  Although no diagnostic regional wall motion abnormality was identified, this possibility cannot be completely excluded on the basis of this study  No significant valvular abnormalities     · Appreciate cardiology input, discussed with Dr Pablito Kingston today; patient cleared for discharge from cardiac perspective, recommended holding Lasix x 3 days and resuming 20 mg daily dose with close outpatient follow-up with primary cardiologist for ongoing management   · PT/OT evaluations pending     RAYMUNDO (acute kidney injury) ([facility] [address] )  Assessment & Plan  · POA with creatinine 1 50, baseline creatinine approximately 0 7-0 9  · Suspect due to hypotension and diuretic use   · Creatinine improved s/p blood transfusion and IVF - 1 23 today   · Appreciate cariology input regarding diuretics   · Avoid nephrotoxins or further hypotension  · Monitor BMP    Anemia  Assessment & Plan  · Hemoglobin on admission 12 1 -> 9 2 -> 9 2 today   · Patient had significant blood loss from scalp laceration, received 1U PRBCs in trauma bay in setting of blood loss and hypotension  · Also consider component of hemodilution in the setting of fluid resuscitation on admission   · CTH on admission without intracranial hemorrhage   · Monitor closely     History of ventricular tachycardia  Assessment & Plan  · S/p ICD in July 2020  · Follows with [facility] cardiology   · Continue home dose amiodarone 400 mg daily, Toprol- mg daily   · ICD interrogation without events   · Telemetry review unremarkable     Cardiomyopathy [facility])  Assessment & Plan  · Follows with [facility] cardiology  · Most recent echo July 2020 with LVEF 50% and without RWMA  · Repeat echo essentially unchanged from prior  · Appreciate cardiology input  · Recommending hold home dose Lasix x 3 days and resume 20 mg daily thereafter with close outpatient follow-up  · Monitor daily weights, intake and output  · Cardiac diet    Bilateral carotid artery stenosis  Assessment & Plan  · Noted history of L ICA stent  · September 2017 R ICA with 70-79% stenosis   · Repeat carotid US with stable disease  · Continue statin, Plavix    · Outpatient follow-up     Atrial fibrillation (HCC)  Assessment & Plan  · Rate controlled on metoprolol and amiodarone   · AC with Eliquis    CAD (coronary artery disease)  Assessment & Plan  · Status post PCI x3  · Troponin negative and EKG without ischemic changes   · Continue statin, Plavix, beta-blocker    History of pulmonary embolus (PE)  Assessment & Plan  · AC with Eliquis    Bullous pemphigoid  Assessment & Plan  · Noted history and follows with dermatology as outpatient   · Continue with prednisone 2 5 mg daily     Fall  Assessment & Plan  · Trauma workup unremarkable aside from posterior scalp laceration as mentioned  · PT/OT evaluations pending     Laceration of head, subsequent encounter  Assessment & Plan  · Presented as a trauma alert status post fall at home  · CT head on admission without acute intracranial abnormality or hemorrhage  · Posterior head laceration repaired with 6 sutures on day of admission 03/19  · Appreciate ongoing trauma recommendations    Type 2 diabetes mellitus, without long-term current use of insulin [facility])  Assessment & Plan  No results found for: HGBA1C    Recent Labs     21  1149 21  1632 21  2052 21  0511   POCGLU 241* 163* 113 142*       Blood Sugar Average: Last 72 hrs:  (P) 159 5  · Hold oral medications while inpatient  · Noted patient on chronic steroids for dermatologic purposes, currently being weaned in the outpatient setting   · Q i d  Accu-Cheks with SSI coverage  · Diabetic diet  · Hypoglycemia protocol    VTE Pharmacologic Prophylaxis:   Pharmacologic: Apixaban (Eliquis)  Mechanical VTE Prophylaxis in Place: No    Patient Centered Rounds: I have performed bedside rounds with nursing staff today  Discussions with Specialists or Other Care Team Provider: primary RN, case management, PT    Education and Discussions with Family / Patient: patient, will call daughter with update     Time Spent for Care: 20 minutes  More than 50% of total time spent on counseling and coordination of care as described above  Current Length of Stay: 2 day(s)    Current Patient Status: Inpatient   Certification Statement: The patient will continue to require additional inpatient hospital stay due to awaiting clinincal and symptomatic improvement     Discharge Plan: possibly later today or tomorrow pending PT/OT recommendations and safe discharge plan     Code Status: Level 1 - Full Code    Subjective:   Patient reports not sleeping well again last night  States she is unable to sleep because she can't get comfortable due to the two IV sites and the stiches in her head  Reports feeling generally weak and fatigued but attributes this to lack of sleep  She also reports feeling unsteady when she stands so she's been using the walker  Denies lightheadedness/dizziness, palpitations, chest pain or SOB       Objective:     Vitals:   Temp (24hrs), Av 1 °F (36 7 °C), Min:98 °F (36 7 °C), Max:98 2 °F (36 8 °C)    Temp:  [98 °F (36 7 °C)-98 2 °F (36 8 °C)] 98 2 °F (36 8 °C)  HR:  [60-68] 60  Resp:  [18-20] 20  BP: (117-136)/(57-67) 117/57  SpO2:  [94 %-97 %] 97 %  There is no height or weight on file to calculate BMI  Input and Output Summary (last 24 hours): Intake/Output Summary (Last 24 hours) at 3/21/2021 0947  Last data filed at 3/21/2021 0601  Gross per 24 hour   Intake 300 ml   Output 550 ml   Net -250 ml       Physical Exam:     Physical Exam  Vitals signs and nursing note reviewed  Exam conducted with a chaperone present  Constitutional:       General: She is not in acute distress  HENT:      Head:      Comments: Occipital scalp with repaired laceration, no active bleeding or drainage noted   Cardiovascular:      Rate and Rhythm: Normal rate and regular rhythm  Pulses: Normal pulses  Pulmonary:      Effort: Pulmonary effort is normal  No respiratory distress  Breath sounds: No wheezing or rales  Abdominal:      General: Abdomen is flat  There is no distension  Palpations: Abdomen is soft  Tenderness: There is no abdominal tenderness  Skin:     Findings: Bruising (upperback, left side) present  Neurological:      Mental Status: She is alert         Additional Data:     Labs:    Results from last 7 days   Lab Units 03/21/21  0512   WBC Thousand/uL 8 24   HEMOGLOBIN g/dL 9 2*   HEMATOCRIT % 29 1*   PLATELETS Thousands/uL 204   NEUTROS PCT % 66   LYMPHS PCT % 18   MONOS PCT % 11   EOS PCT % 3     Results from last 7 days   Lab Units 03/21/21  0512   SODIUM mmol/L 143   POTASSIUM mmol/L 4 0   CHLORIDE mmol/L 113*   CO2 mmol/L 24   BUN mg/dL 18   CREATININE mg/dL 1 23   ANION GAP mmol/L 6   CALCIUM mg/dL 8 4   GLUCOSE RANDOM mg/dL 136     Results from last 7 days   Lab Units 03/19/21  1047   INR  1 22*     Results from last 7 days   Lab Units 03/21/21  0511 03/20/21  2052 03/20/21  1632 03/20/21  1149 03/20/21  0624 03/19/21  2300   POC GLUCOSE mg/dl 142* 113 163* 241* 138 160*                   * I Have Reviewed All Lab Data Listed Above  * Additional Pertinent Lab Tests Reviewed: All Labs Within Last 24 Hours Reviewed    Imaging:    Imaging Reports Reviewed Today Include: none  Imaging Personally Reviewed by Myself Includes:  none    Recent Cultures (last 7 days):           Last 24 Hours Medication List:   Current Facility-Administered Medications   Medication Dose Route Frequency Provider Last Rate    acetaminophen  650 mg Oral Q6H PRN Danielle E Held, PA-MELINA      amiodarone  400 mg Oral Daily Danielle E Held, PA-MELINA      apixaban  5 mg Oral BID Danielle E Held, PA-MELINA      clopidogrel  75 mg Oral Daily Danielle E Held, PA-MELINA      insulin lispro  1-5 Units Subcutaneous TID [facility] Madhu Buitrago MD      insulin lispro  1-5 Units Subcutaneous HS Madhu Buitrago MD      metoprolol succinate  175 mg Oral Daily Danielle E Held, [place]      ondansetron  4 mg Intravenous Q6H PRN Danielle E Held, [place]      pravastatin  40 mg Oral After [employer] Held, PA-MELINA      predniSONE  2 5 mg Oral Daily Danielle E Held, [place]          Today, Patient Was Seen By: Jim Mcdonald PA-C    ** Please Note: Dictation voice to text software may have been used in the creation of this document   **

## 2021-03-21 NOTE — PLAN OF CARE
Problem: PHYSICAL THERAPY ADULT  Goal: Performs mobility at highest level of function for planned discharge setting  See evaluation for individualized goals  Description: Treatment/Interventions: Functional transfer training, LE strengthening/ROM, Therapeutic exercise, Elevations, Endurance training, Patient/family training, Equipment eval/education, Bed mobility, Gait training, Compensatory technique education, Continued evaluation, Spoke to MD  Equipment Recommended: Walker(RW)       See flowsheet documentation for full assessment, interventions and recommendations  Note: Prognosis: Good  Problem List: Impaired balance, Decreased skin integrity  Assessment: Pt is 81 y o  female seen for PT evaluation s/p admit to [facility] on 3/19/2021  Pt presenting as a trauma alert s/p fall at home w/ head strike pt w/ hx of syncope/ pre syncope  CTB (-) and s/p repair of posterior head laceration on 3/19/21  pt  and also  has a past medical history of Diabetes mellitus ([facility] [address] ) and Hypertension  vent tach s//p ICD cardiomyopathy and B/L arterial stenosis  Current dx/ problem list includes: fall; RAYMUNDO anemia; (see above)   Personal factors affecting pt at time of IE include: steps to enter environment, limited home support, advanced age, past experience, inability to perform IADLs, inability to perform ADLs, inability to ambulate household distances, limited insight into impairments and recent fall(s)    Due to acute medical issues, ongoing medical workup for primary dx, fall risk, increased reliance on more restrictive AD compared to baseline;  decreased activity tolerance compared to baseline, increased assistance needed from caregiver at current time, continuous telemetry monitoring, multiple lines, decline in overall functional mobility status; health management issues; note unstable clinical picture (high complexity)   Prior to admission, pt reports living alone in a multilevel home w/ FFSU- 23- TOBIAS- and was not usign AD at baseline; reports having supportive daughters and can have one of them stay w/ her on initial d/c home  Pt is eager to d/c home today    Currently pt  is requiring S A for bed skills; S for functional transfers and S for ambulation w/ RW and w/o AD- at present time PT recommending use of RW for safety  Jason Edge Pt presents functioning below baseline and currently w/ overall mobility deficits 2* to: intermittent dizziness   generalized weakness/ deconditioning; impaired balance; SOB/JIANG; fatigue; Pt currently at risk for falls  (Please find additional objective findings from PT assessment regarding body systems outlined above ) Pt will continue to benefit from skilled PT interventions to address stated impairments; to maximize functional potential; for ongoing pt/ family training; and DME needs  Cleared for home w/ home PT OT w/ temporary support of daughter on d/c - use of RW- will follow for higher level gait training/ balance if pt remains inpt  Jason Edge Pt/ family agreeable to plan and goals as stated on evaluation  Barriers to Discharge: None  Barriers to Discharge Comments: pt reprots will have temp  24/7 of supportive daughter on d/c   PT Discharge Recommendation: Home with skilled therapy, Return to previous environment with social support     PT - OK to Discharge: (S) Yes    See flowsheet documentation for full assessment

## 2021-03-21 NOTE — PHYSICAL THERAPY NOTE
Physical Therapy Evaluation     Patient Name: Roberth Degroot    PZHFN'R Date: 3/21/2021     Problem List  Principal Problem:    Near syncope  Active Problems:    RAYMUNDO (acute kidney injury) ([facility] [address] )    Cardiomyopathy ([facility] )    History of pulmonary embolus (PE)    Type 2 diabetes mellitus, without long-term current use of insulin (HCC)    Bilateral carotid artery stenosis    CAD (coronary artery disease)    Atrial fibrillation (HCC)    Laceration of head, subsequent encounter    History of ventricular tachycardia    Fall    Bullous pemphigoid    Anemia       Past Medical History  Past Medical History:   Diagnosis Date    Diabetes mellitus ([facility] )     Hypertension         Past Surgical History  History reviewed  No pertinent surgical history  03/21/21 0930   PT Last Visit   PT Visit Date 03/21/21   Note Type   Note type Evaluation   Pain Assessment   Pain Assessment Tool 0-10   Pain Score No Pain   Home Living   Type of Home House   Home Layout Multi-level; Laundry in basement;Stairs to enter with rails  (2-3 TOBIAS w/ rails)   Home Equipment Walker;Cane   Prior Function   Level of [place] Independent with ADLs and functional mobility   Lives With Alone   Receives Help From Family  (supportive daughters- plane for 24/7 on d/c temp )   ADL Assistance Independent   IADLs Independent   Falls in the last 6 months 1 to 4  (2-3 falls in a year  )   Vocational Retired   Comments pt reports living alone in a multilevel home w/ 1st setup; occasional use of RW "w/ fatigue"- 0 drive - has A w/ laundry and IADLs - pt reports daughter can stay w/ her temp on d/c 24/7 for safety    Restrictions/Precautions   Weight Bearing Precautions Per Order No   Braces or Orthoses   (none )   General   Additional Pertinent History fall on Eliquis w/ head strike- CTB(-) head lac- s   Cognition   Overall Cognitive Status WFL   Orientation Level Oriented X4   Memory Within functional limits Following Commands Follows all commands and directions without difficulty   Comments pleasant and cooperative    RUE Assessment   RUE Assessment WFL   LUE Assessment   LUE Assessment WFL   RLE Assessment   RLE Assessment WFL   LLE Assessment   LLE Assessment WFL   Coordination   Movements are Fluid and Coordinated 1   Light Touch   RLE Light Touch Grossly intact   LLE Light Touch Grossly intact   Bed Mobility   Rolling L 5  Supervision   Supine to Sit 5  Supervision   Sit to Supine 5  Supervision   Transfers   Sit to Stand 5  Supervision   Stand to Sit 5  Supervision   Additional Comments all xfers w/o AD- 0 LOB- denies dizziness    Ambulation/Elevation   Gait pattern WNL   Gait Assistance 5  Supervision   Additional items Verbal cues   Assistive Device Rolling walker;None   Distance 120' w/ RW and DS w/o LOB; 120' w/o AD w/ S- high guard w/ UE- but w/o overt LOB- able to perform rotational turns and obstacle negotiation w/o LOB- improved safety noted when using RW- PT recommending use of RW on initial d/c for safety- pt in agreement    Stair Management Assistance 5  Supervision  (simulated via high march/squat in willis x5- )   Balance   Static Sitting Good   Dynamic Sitting Good   Static Standing Fair +   Dynamic Standing Fair +   Ambulatory Fair +  (w/ RW)   Endurance Deficit   Endurance Deficit No   Activity Tolerance   Activity Tolerance Patient limited by fatigue;Treatment limited secondary to agitation   Medical Staff Made Aware yes Wayne White- OT; RN Harpreet Sepulveda    Nurse Made Aware yes cleared for session    Assessment   Prognosis Good   Problem List Impaired balance;Decreased skin integrity   Assessment Pt is 81 y o  female seen for PT evaluation s/p admit to [facility] on 3/19/2021  Pt presenting as a trauma alert s/p fall at home w/ head strike pt w/ hx of syncope/ pre syncope   CTB (-) and s/p repair of posterior head laceration on 3/19/21  pt  and also  has a past medical history of Diabetes mellitus ([facility] [address] ) and Hypertension  vent tach s//p ICD cardiomyopathy and B/L arterial stenosis  Current dx/ problem list includes: fall; RAYMUNDO anemia; (see above)   Personal factors affecting pt at time of IE include: steps to enter environment, limited home support, advanced age, past experience, inability to perform IADLs, inability to perform ADLs, inability to ambulate household distances, limited insight into impairments and recent fall(s)  Due to acute medical issues, ongoing medical workup for primary dx, fall risk, increased reliance on more restrictive AD compared to baseline;  decreased activity tolerance compared to baseline, increased assistance needed from caregiver at current time, continuous telemetry monitoring, multiple lines, decline in overall functional mobility status; health management issues; note unstable clinical picture (high complexity)   Prior to admission, pt reports living alone in a multilevel home w/ SU- 23- TOBIAS- and was not usign AD at baseline; reports having supportive daughters and can have one of them stay w/ her on initial d/c home  Pt is eager to d/c home today    Currently pt  is requiring S A for bed skills; S for functional transfers and S for ambulation w/ RW and w/o AD- at present time PT recommending use of RW for safety  Jazmine Birch Pt presents functioning below baseline and currently w/ overall mobility deficits 2* to: intermittent dizziness   generalized weakness/ deconditioning; impaired balance; SOB/JIANG; fatigue; Pt currently at risk for falls  (Please find additional objective findings from PT assessment regarding body systems outlined above ) Pt will continue to benefit from skilled PT interventions to address stated impairments; to maximize functional potential; for ongoing pt/ family training; and DME needs  Cleared for home w/ home PT OT w/ temporary support of daughter on d/c - use of RW- will follow for higher level gait training/ balance if pt remains inpt  Jazmine Birch   Pt/ family agreeable to plan and goals as stated on evaluation  Barriers to Discharge None   Barriers to Discharge Comments pt reprots will have temp  24/7 of supportive daughter on d/c    Goals   Patient Goals "I want to go home today"    STG Expiration Date 03/31/21   Short Term Goal #1  In 10 days pt will complete: 1) Bed mobility skills with indep to facilitate safe return to previous living environment 2) Functional transfers with indep  to facilitate safe return to previous living environment  3) Ambulation with least restrictive AD indep ' without LOB and stable vitals for safe ambulation home/ community distances  4) Stair training up/ down flight step/s with 1 rail/s  and indep  for safe access to previous living environment  5) Improve balance grades to Good 6) Improve LE strength grades by 1 to increase independence w/ transfers and gait  7) LE HEP independently  8) PT for ongoing pt and family education; DME needs and D/C planning to promote highest level of function in least restrictive environment  PT Treatment Day 0   Plan   Treatment/Interventions Functional transfer training;LE strengthening/ROM; Therapeutic exercise;Elevations; Endurance training;Patient/family training;Equipment eval/education; Bed mobility;Gait training; Compensatory technique education;Continued evaluation;Spoke to MD   Recommendation   PT Discharge Recommendation Home with skilled therapy; Return to previous environment with social support   Equipment Recommended Ildefonso Galicia  (ELIESER)   PT - OK to Discharge Yes   Additional Comments w/ support of daughter ; RW and home therapies    AM-PAC Basic Mobility Inpatient   Turning in Bed Without Bedrails 4   Lying on Back to Sitting on Edge of Flat Bed 4   Moving Bed to Chair 4   Standing Up From Chair 4   Walk in Room 3   Climb 3-5 Stairs 3   Basic Mobility Inpatient Raw Score 22   Basic Mobility Standardized Score 47 4   The patient's AM-PAC Basic Mobility Inpatient Short Form Raw Score is 22, Standardized Score is 47 4  A standardized score greater than 42 9 suggests the patient may benefit from discharge to home  Please also refer to the recommendation of the Physical Therapist for safe discharge planning        Frank Boo, PT

## 2021-03-21 NOTE — ASSESSMENT & PLAN NOTE
· POA with creatinine 1 50, baseline creatinine approximately 0 7-0 9  · Suspect due to hypotension and diuretic use   · Creatinine improved s/p blood transfusion and IVF - 1 23 today   · Appreciate cariology input regarding diuretics   · Avoid nephrotoxins or further hypotension  · Monitor BMP

## 2021-03-21 NOTE — PROGRESS NOTES
Cardiology Progress Note - Esther Serve 81 y o  female MRN: [id number]    Unit/Bed#: CW2 213-02 Encounter: [id number]      Assessment:  Principal Problem:    Near syncope  Active Problems:    RAYMUNDO (acute kidney injury) ([facility] [address] )    Cardiomyopathy ([facility] [address] )    History of pulmonary embolus (PE)    Type 2 diabetes mellitus, without long-term current use of insulin (HCC)    Bilateral carotid artery stenosis    CAD (coronary artery disease)    Atrial fibrillation ([facility])    Laceration of head, subsequent encounter    History of ventricular tachycardia    Fall    Bullous pemphigoid      Plan:  No issues overnight  Holding diuretic in the setting of probable volume depletion  BMP today with potassium of 4 0 and creatinine of 1 23 down from 1 32 yesterday  Hemoglobin 9 2  This is down from admission  Troponin negative  Device check with no evidence of arrhythmia  Echocardiogram yesterday demonstrated preserved LV systolic function with left atrial cavity enlargement and no significant valvular heart disease and no significant change compared to a prior study done June 2020  Cardiac status is stable at this point  Patient okay for discharge from a cardiac point of view  Would hold diuretic as an outpatient for 2 days and then start furosemide 20 mg per day  Thereafter she can follow up with her primary cardiologist     Subjective:   Patient seen and examined  No significant events overnight   negative  Objective:     Vitals: Blood pressure 117/57, pulse 60, temperature 98 2 °F (36 8 °C), temperature source Oral, resp  rate 20, weight 63 8 kg (140 lb 10 5 oz), SpO2 97 %  , There is no height or weight on file to calculate BMI ,   Orthostatic Blood Pressures      Most Recent Value   Blood Pressure  117/57 filed at 03/21/2021 8218   Patient Position - Orthostatic VS  Lying filed at 03/20/2021 2324      ,      Intake/Output Summary (Last 24 hours) at 3/21/2021 0804  Last data filed at 3/21/2021 0601  Gross per 24 hour Intake 300 ml   Output 550 ml   Net -250 ml           Physical Exam:    GEN: Denissesa Leticia appears well, alert and oriented x 3, pleasant and cooperative   NECK: supple, no carotid bruits, no JVD or HJR  HEART: normal rate, regular rhythm, normal S1 and S2, no murmurs, clicks, gallops or rubs   LUNGS: clear to auscultation bilaterally; no wheezes, rales, or rhonchi   ABDOMEN: normal bowel sounds, soft, no tenderness, no distention  EXTREMITIES: peripheral pulses normal; no clubbing, cyanosis, or edema  SKIN: warm and well perfused, no suspicious lesions on exposed skin    Labs & Results:    Admission on 03/19/2021   Component Date Value    ph, [place] ISTAT 03/19/2021 7 379     pCO2, [place] i-STAT 03/19/2021 42 8     pO2, [place] i-STAT 03/19/2021 19 0*    BE, i-STAT 03/19/2021 0     HCO3, [place] i-STAT 03/19/2021 25 2     CO2, i-STAT 03/19/2021 26     O2 Sat, i-STAT 03/19/2021 29*    SODIUM, I-STAT 03/19/2021 140     Potassium, i-STAT 03/19/2021 3 8     Hct, i-STAT 03/19/2021 36     Hgb, i-STAT 03/19/2021 12 2     Glucose, i-STAT 03/19/2021 163*    Specimen Type 03/19/2021 VENOUS     WBC 03/19/2021 8 17     RBC 03/19/2021 4 42     Hemoglobin 03/19/2021 12 1     Hematocrit 03/19/2021 39 0     MCV 03/19/2021 88     MCH 03/19/2021 27 4     MCHC 03/19/2021 31 0*    RDW 03/19/2021 15 4*    MPV 03/19/2021 11 0     Platelets 91/00/9726 328     nRBC 03/19/2021 0     Neutrophils Relative 03/19/2021 58     Immat GRANS % 03/19/2021 1     Lymphocytes Relative 03/19/2021 25     Monocytes Relative 03/19/2021 12     Eosinophils Relative 03/19/2021 3     Basophils Relative 03/19/2021 1     Neutrophils Absolute 03/19/2021 4 74     Immature Grans Absolute 03/19/2021 0 06     Lymphocytes Absolute 03/19/2021 2 03     Monocytes Absolute 03/19/2021 0 97     Eosinophils Absolute 03/19/2021 0 28     Basophils Absolute 03/19/2021 0 09     Sodium 03/19/2021 142     Potassium 03/19/2021 3 8     Chloride 03/19/2021 107     CO2 03/19/2021 25     ANION GAP 03/19/2021 10     BUN 03/19/2021 22     Creatinine 03/19/2021 1 50*    Glucose 03/19/2021 161*    Calcium 03/19/2021 9 7     eGFR 03/19/2021 32     Protime 03/19/2021 15 4*    INR 03/19/2021 1 22*    PTT 03/19/2021 25     ABO Grouping 03/19/2021 B     Rh Factor 03/19/2021 Positive     Antibody Screen 03/19/2021 Negative     Specimen Expiration Date 03/19/2021 [id number]     Troponin I 03/19/2021 <0 02     ABO Grouping 03/19/2021 B     Rh Factor 03/19/2021 Positive     POC Glucose 03/19/2021 160*    Sodium 03/20/2021 141     Potassium 03/20/2021 4 1     Chloride 03/20/2021 111*    CO2 03/20/2021 24     ANION GAP 03/20/2021 6     BUN 03/20/2021 23     Creatinine 03/20/2021 1 32*    Glucose 03/20/2021 147*    Calcium 03/20/2021 8 5     eGFR 03/20/2021 37     WBC 03/20/2021 8 08     RBC 03/20/2021 3 64*    Hemoglobin 03/20/2021 9 8*    Hematocrit 03/20/2021 32 5*    MCV 03/20/2021 89     MCH 03/20/2021 26 9     MCHC 03/20/2021 30 2*    RDW 03/20/2021 15 6*    MPV 03/20/2021 11 3     Platelets 92/84/0796 214     nRBC 03/20/2021 0     Neutrophils Relative 03/20/2021 67     Immat GRANS % 03/20/2021 1     Lymphocytes Relative 03/20/2021 18     Monocytes Relative 03/20/2021 11     Eosinophils Relative 03/20/2021 2     Basophils Relative 03/20/2021 1     Neutrophils Absolute 03/20/2021 5 47     Immature Grans Absolute 03/20/2021 0 05     Lymphocytes Absolute 03/20/2021 1 46     Monocytes Absolute 03/20/2021 0 92     Eosinophils Absolute 03/20/2021 0 13     Basophils Absolute 03/20/2021 0 05     Magnesium 03/20/2021 2 0     POC Glucose 03/20/2021 138     POC Glucose 03/20/2021 241*    POC Glucose 03/20/2021 163*    Ventricular Rate 03/20/2021 62     Atrial Rate 03/20/2021 62     OK Interval 03/20/2021 246     QRSD Interval 03/20/2021 116     QT Interval 03/20/2021 476     QTC Interval 03/20/2021 483     P Axis 03/20/2021 80     QRS Axis 03/20/2021 -1     T Wave Axis 03/20/2021 123     POC Glucose 03/20/2021 113     Sodium 03/21/2021 143     Potassium 03/21/2021 4 0     Chloride 03/21/2021 113*    CO2 03/21/2021 24     ANION GAP 03/21/2021 6     BUN 03/21/2021 18     Creatinine 03/21/2021 1 23     Glucose 03/21/2021 136     Calcium 03/21/2021 8 4     eGFR 03/21/2021 40     WBC 03/21/2021 8 24     RBC 03/21/2021 3 29*    Hemoglobin 03/21/2021 9 2*    Hematocrit 03/21/2021 29 1*    MCV 03/21/2021 88     MCH 03/21/2021 28 0     MCHC 03/21/2021 31 6     RDW 03/21/2021 15 8*    MPV 03/21/2021 11 1     Platelets 09/28/9457 204     nRBC 03/21/2021 0     Neutrophils Relative 03/21/2021 66     Immat GRANS % 03/21/2021 1     Lymphocytes Relative 03/21/2021 18     Monocytes Relative 03/21/2021 11     Eosinophils Relative 03/21/2021 3     Basophils Relative 03/21/2021 1     Neutrophils Absolute 03/21/2021 5 50     Immature Grans Absolute 03/21/2021 0 07     Lymphocytes Absolute 03/21/2021 1 44     Monocytes Absolute 03/21/2021 0 91     Eosinophils Absolute 03/21/2021 0 28     Basophils Absolute 03/21/2021 0 04     POC Glucose 03/21/2021 142*       Trauma - Ct Head Wo Contrast    Result Date: 3/19/2021  Narrative: CT BRAIN - WITHOUT CONTRAST INDICATION:   TRAUMA  COMPARISON:  None  TECHNIQUE:  CT examination of the brain was performed  In addition to axial images, sagittal and coronal 2D reformatted images were created and submitted for interpretation  Radiation dose length product (DLP) for this visit:  843 44 mGy-cm   This examination, like all CT scans performed in the [facility], was performed utilizing techniques to minimize radiation dose exposure, including the use of iterative  reconstruction and automated exposure control  IMAGE QUALITY:  Diagnostic   FINDINGS: PARENCHYMA: Decreased attenuation is noted in periventricular and subcortical white matter demonstrating an appearance that is statistically most likely to represent mild microangiopathic change  No CT signs of acute infarction  No intracranial mass, mass effect or midline shift  No acute parenchymal hemorrhage  VENTRICLES AND EXTRA-AXIAL SPACES:  Normal for the patient's age  VISUALIZED ORBITS AND PARANASAL SINUSES:  Unremarkable  CALVARIUM AND EXTRACRANIAL SOFT TISSUES:  Calvarium intact  Subcutaneous soft tissue laceration with hematoma in the left paramedian posterior parieto-occipital region  Impression: No acute intracranial abnormality  I personally discussed this study with Alyssa Jones on 3/19/2021 at 11:21 AM  Workstation performed: [id number]     Trauma - Ct Spine Cervical Wo Contrast    Result Date: 3/19/2021  Narrative: CT CERVICAL SPINE - WITHOUT CONTRAST INDICATION:   TRAUMA  COMPARISON:  6/25/2020 TECHNIQUE:  CT examination of the cervical spine was performed without intravenous contrast   Contiguous axial images were obtained  Sagittal and coronal reconstructions were performed  Radiation dose length product (DLP) for this visit:  369 6 mGy-cm   This examination, like all CT scans performed in the [facility], was performed utilizing techniques to minimize radiation dose exposure, including the use of iterative reconstruction and automated exposure control  IMAGE QUALITY:  Diagnostic  FINDINGS: ALIGNMENT:  Normal alignment of the cervical spine  No subluxation  VERTEBRAL BODIES:  No fracture  DEGENERATIVE CHANGES:  Mild multilevel cervical degenerative changes are noted without critical central canal stenosis  PREVERTEBRAL AND PARASPINAL SOFT TISSUES:  Bilateral carotid artery calcifications, with left carotid artery stent  THORACIC INLET:  Normal      Impression: No cervical spine fracture or traumatic malalignment   I personally discussed this study with Alyssa Jones on 3/19/2021 at 11:21 AM   Workstation performed: [id number]     Xr Trauma Multiple    Result Date: 3/19/2021  Narrative: CHEST INDICATION: Injury  COMPARISON: 6/25/2020 VIEWS:  AP supine portable FINDINGS: Left-sided chest wall intracardiac device is identified  Leads are intact  Unchanged cardiomegaly  Left upper chest is partially obscured by the pacer generator  Visualized lungs are clear  No pleural effusion  No pneumothorax is seen on this supine film  Upright imaging is more sensitive to detect anterior pneumothorax if relevant  Left chest partially obscured by the pacer generator  No displaced fractures are appreciated  Impression: No acute cardiopulmonary disease within limitations of supine imaging  Workstation performed: [id number]     Vas Carotid Complete Study    Result Date: 3/20/2021  Narrative:  [facility] REPORT CLINICAL: Indications: Patient presents with recent episode of dizziness and fall  She is currently asymptomatic  Risk Factors The patient has history of HTN, Diabetes (NIDDM (Diet)) and CAD  FINDINGS:  Right        Impression  PSV  EDV (cm/s)  Direction of Flow  Ratio  Dist  ICA                 60          13                      0 94  Mid  ICA                  89          12                      1 41  Prox  ICA    70%+        293          60                      4 64  Dist CCA                  59           9                            Mid CCA                   63          14                      0 83  Prox CCA                  76          12                            Ext Carotid              510          41                      8 07  Prox Vert                 42          11  Antegrade                 Subclavian               278          13                             Left         PSV  EDV (cm/s)  Direction of Flow  Ratio  Dist  ICA     51          14                      0 62  Mid  ICA     111          15                      1 35  Prox   ICA     90          20                      1 10  Dist CCA      56          10                            Mid CCA       82 12                      0 81  Prox CCA     102          14                            Ext Carotid  262           0                      3 19  Prox Vert     61          13  Antegrade                 Subclavian   154           0                               CONCLUSION:  Impression RIGHT: There is >70% stenosis noted in the internal carotid artery  Plaque is heterogenous and irregular  Vertebral artery flow is antegrade  There is no significant subclavian artery disease  LEFT: The internal carotid artery stent appears to be patent  Vertebral artery flow is antegrade  There is no significant subclavian artery disease  Recommend repeat testing in 6month as per protocol unless otherwise indicated  Internal carotid artery stenosis determination by consensus criteria from: Abram Kumar et al  Carotid Artery Stenosis: Gray-Scale and Doppler US Diagnosis - Society of Radiologists in [address] [place] [address], Radiology 2003; [phone]  SIGNATURE: Electronically Signed by: Mya Devlin on 2021-03-20 08:42:12 AM    Us Bedside Procedure    Result Date: 3/19/2021  Narrative: [phone] [id number]  5 [id number]  4 [id number]      EKG personally reviewed by Noam Ramirez MD      Counseling / Coordination of Care  Total floor / unit time spent today 30 minutes  Greater than 50% of total time was spent with the patient and / or family counseling and / or coordination of care

## 2021-03-21 NOTE — OCCUPATIONAL THERAPY NOTE
Occupational Therapy Evaluation     Patient Name: Esther Gonzalez  KTATB'Q Date: 3/21/2021  Problem List  Principal Problem:    Near syncope  Active Problems:    RAYMUNDO (acute kidney injury) ([facility] [address] )    Cardiomyopathy ([facility] )    History of pulmonary embolus (PE)    Type 2 diabetes mellitus, without long-term current use of insulin (HCC)    Bilateral carotid artery stenosis    CAD (coronary artery disease)    Atrial fibrillation (HCC)    Laceration of head, subsequent encounter    History of ventricular tachycardia    Fall    Bullous pemphigoid    Anemia    Past Medical History  Past Medical History:   Diagnosis Date    Diabetes mellitus ([facility] )     Hypertension      Past Surgical History  History reviewed  No pertinent surgical history  03/21/21 0918   OT Last Visit   OT Visit Date 03/21/21   Note Type   Note type Evaluation   Restrictions/Precautions   Weight Bearing Precautions Per Order No   Other Precautions Chair Alarm; Fall Risk   Pain Assessment   Pain Assessment Tool 0-10   Pain Score No Pain   Home Living   Type of Home House   Home Layout Multi-level; Able to live on main level with bedroom/bathroom; Laundry in basement  (2-3STE with rail )   Bathroom Shower/Tub Tub/shower unit   Bathroom Toilet Raised   Bathroom Equipment Tub transfer [place]  (uses RW occassionally )   Prior Function   Level of [place] Independent with ADLs and functional mobility   Lives With Alone   Receives Help From Family   ADL Assistance Independent   IADLs Needs assistance   Falls in the last 6 months   (2-3 in the past year )   Vocational Retired   Comments Daughter assists with laundry    Lifestyle   Autonomy PTA pt was I with ADLs, dtr assists with laundry, (-)    Reciprocal Relationships Family    Service to Others Retired   [address] and sudoku   Psychosocial   Psychosocial (WDL) WDL   Subjective   Subjective "My kids were talking to me about meals on wheels"   ADL   Where Assessed Edge of bed   Eating Assistance 7  Independent   Grooming Assistance 5  Supervision/Setup   UB Bathing Assistance 5  Supervision/Setup   LB Bathing Assistance 5  Supervision/Setup   UB Dressing Assistance 5  Supervision/Setup   LB Dressing Assistance 5  Supervision/Setup   Toileting Assistance  5  Supervision/Setup   Bed Mobility   Supine to Sit 5  Supervision   Sit to Supine 5  Supervision   Transfers   Sit to Stand 5  Supervision   Stand to Sit 5  Supervision   Toilet transfer 4  Minimal assistance   Additional items   (low toilet - pt has raised toilet at home)   Functional Mobility   Functional Mobility 5  Supervision   Additional Comments Initally uses RW but progresses to no AD    Balance   Static Sitting Fair +   Dynamic Sitting Fair   Static Standing Fair   Dynamic Standing Fair   Ambulatory Fair   Activity Tolerance   Activity Tolerance Patient tolerated treatment well   Medical Staff Made Aware PT Phoebe   Nurse Made Aware yes   RUE Assessment   RUE Assessment WFL   LUE Assessment   LUE Assessment WFL   Hand Function   Gross Motor Coordination Functional   Fine Motor Coordination Functional   Sensation   Light Touch No apparent deficits   Sharp/Dull No apparent deficits   Vision - Complex Assessment   Ocular Range of Motion WFL   Head Position WDL   Tracking Able to track stimulus in all quads without difficulty   Acuity Able to read clock/calendar on wall without difficulty   Perception   Inattention/Neglect Appears intact   Cognition   Overall Cognitive Status [facility]   Arousal/Participation Alert; Responsive; Cooperative   Attention Within functional limits   Orientation Level Oriented X4   Memory Within functional limits   Following Commands Follows all commands and directions without difficulty   Comments Pt pleasant and cooperative t/o session  Pt demonstrates ability to perform delayed recall and good safety awareness      Assessment   Assessment Pt is a 81 y o  female admitted to [facility] on 3/19/2021 w/ Near syncope, s/p fall + head lac   has a past medical history of Diabetes mellitus ([facility] [address] ) and Hypertension  Pt with active OT orders and up with assistance  orders  As per pt report, pta, resides in a multi-level home, 2-3STE, alone  Pt was I w/  ADLS, dtr assists with IADLS, (-) drove  Upon evaluation, pt currently requires S for transfers and mobility  Pt currently  requires S UB ADLs, S LB ADLs, and S toileting  Pt initially requires RW for transfers and fnxl ambulation; however, progresses to no AD required  Pt did require MIN A to stand from toilet, however, toilet was low and pt does have a raised toilet at home  Pt demonstrated ability to perform delayed recall and answer safety questions appropriately  Pt has no questions or concerns for home at this time, reports dtr can stay with her upon d/c - recommend this plan for safety, along with skilled therapy  Plan to administer formal cognitive assessment to determine pt safety for being home alone if unwilling/unable to stay with dtr upon d/c  Pt was left in bed with alarm on after session with all current needs met  The patient's raw score on the AM-PAC Daily Activity inpatient short form is 19, standardized score is 40 22, greater than 39 4  Patients at this level are likely to benefit from discharge to home  Please refer to the recommendation of the Occupational Therapist for safe discharge planning     Goals   Patient Goals to go home    Recommendation   OT Discharge Recommendation Home with skilled therapy  (+ social support - recommend dtr stay with pt upon d/c)   OT - OK to Discharge Yes  (when medically stable )   AM-PAC Daily Activity Inpatient   Lower Body Dressing 3   Bathing 3   Toileting 3   Upper Body Dressing 3   Grooming 3   Eating 4   Daily Activity Raw Score 19   Daily Activity Standardized Score (Calc for Raw Score >=11) 40 22   AM-PAC Applied Cognition Inpatient   Following a Speech/Presentation 3   Understanding Ordinary Conversation 4 Taking Medications 4   Remembering Where Things Are Placed or Put Away 4   Remembering List of 4-5 Errands 4   Taking Care of Complicated Tasks 3   Applied Cognition Raw Score 22   Applied Cognition Standardized Score 47 83       GOALS    1) Pt will engage in ongoing cognitive assessment w/ G participation to assist w/ safe d/c planning/recommendations         Kim Rodriguez, MS, OTR/L

## 2021-03-21 NOTE — ASSESSMENT & PLAN NOTE
· Patient with noted history of recurrent syncope/presyncope  Prior admission in July 2020 felt to 2/2 dehydration given concurrent RAYMUNDO and hyponatremia  Noted history of VT s/p ICD in July 2020  · Hypotensive on admission, BP improved s/p IVF   · ? Symptomatic carotid stenosis (see related plan)  · ICD interrogation without events   · Orthostatics negative     · Telemetry review unremarkable and has been discontinued   · Repeat echo: Systolic function was normal  Ejection fraction was estimated to be 55 %  Although no diagnostic regional wall motion abnormality was identified, this possibility cannot be completely excluded on the basis of this study  No significant valvular abnormalities     · Appreciate cardiology input, discussed with Dr Maximo Farah today; patient cleared for discharge from cardiac perspective, recommended holding Lasix x 3 days and resuming 20 mg daily dose with close outpatient follow-up with primary cardiologist for ongoing management   · PT/OT evaluations pending

## 2021-03-21 NOTE — CASE MANAGEMENT
LOS 2  Unplanned readmission risk score: 13  Not a bundle  Spoke with pt to discuss the role of CM and to discuss any help pt may need prior to dc  Pt lives alone on a 2 story house with 2 TOBIAS and 13 steps to the basement washer and dryer  Pt has a 1st floor setup  Pt performed ADL's indptly pta, pt uses a RW for ambulation  Pt's daughter Mychal Tomas assists with laundry, medications, and groucery shopping  Mariam checks on pt frequently  Pt has a hx with [facility]  Hx of rehab at [facility] and [facility]  No hx of mental health or D&A treatment  Pt has a new PCP; unsure of name and has not seen them in person yet  Pharmacy is [facility] in [place]  CM discussed therapies recommendation for rehab  Pt is requesting [facility]  A post acute care recommendation was made by your care team for [facility]  Discussed [place] of Choice with patient  List of agencies given to patient via in person  patient aware the list is custom filtered for them by preference  and that [facility] post acute providers are designated  ECIN referral sent to [facility]  Contact: Margie Vanegas (daughter) [phone]  Margie Vanegas will transport home at dc       CM reviewed d/c planning process including the following: identifying help at home, patient preference for d/c planning needs, Discharge Lounge, [facility] Meds to Bed program, availability of treatment team to discuss questions or concerns patient and/or family may have regarding understanding medications and recognizing signs and symptoms once discharged   CM also encouraged patient to follow up with all recommended appointments after discharge  Patient advised of importance for patient and family to participate in managing patients medical well being

## 2021-03-21 NOTE — PLAN OF CARE
Problem: OCCUPATIONAL THERAPY ADULT  Goal: Performs self-care activities at highest level of function for planned discharge setting  See evaluation for individualized goals  Description:            See flowsheet documentation for full assessment, interventions and recommendations  Note:       Assessment: Pt is a 81 y o  female admitted to [facility] on 3/19/2021 w/ Near syncope, s/p fall + head lac   has a past medical history of Diabetes mellitus ([address] ) and Hypertension  Pt with active OT orders and up with assistance  orders  As per pt report, pta, resides in a multi-level home, 2-3STE, alone  Pt was I w/  ADLS, dtr assists with IADLS, (-) drove  Upon evaluation, pt currently requires S for transfers and mobility  Pt currently  requires S UB ADLs, S LB ADLs, and S toileting  Pt initially requires RW for transfers and fnxl ambulation; however, progresses to no AD required  Pt did require MIN A to stand from toilet, however, toilet was low and pt does have a raised toilet at home  Pt demonstrated ability to perform delayed recall and answer safety questions appropriately  Pt has no questions or concerns for home at this time, reports dtr can stay with her upon d/c - recommend this plan for safety, along with skilled therapy  Plan to administer formal cognitive assessment to determine pt safety for being home alone if unwilling/unable to stay with dtr upon d/c  Pt was left in bed with alarm on after session with all current needs met  The patient's raw score on the AM-PAC Daily Activity inpatient short form is 19, standardized score is 40 22, greater than 39 4  Patients at this level are likely to benefit from discharge to home  Please refer to the recommendation of the Occupational Therapist for safe discharge planning       OT Discharge Recommendation: Home with skilled therapy(+ social support - recommend dtr stay with pt upon d/c)  OT - OK to Discharge: Yes(when medically stable )

## 2021-03-21 NOTE — ASSESSMENT & PLAN NOTE
· Follows with [facility] cardiology  · Most recent echo July 2020 with LVEF 50% and without RWMA  · Repeat echo essentially unchanged from prior  · Appreciate cardiology input  · Recommending hold home dose Lasix x 3 days and resume 20 mg daily thereafter with close outpatient follow-up  · Monitor daily weights, intake and output  · Cardiac diet

## 2021-03-22 VITALS
DIASTOLIC BLOOD PRESSURE: 53 MMHG | OXYGEN SATURATION: 95 % | HEART RATE: 60 BPM | WEIGHT: 136.38 LBS | SYSTOLIC BLOOD PRESSURE: 114 MMHG | TEMPERATURE: 98.6 F | RESPIRATION RATE: 16 BRPM

## 2021-03-22 LAB
ANION GAP SERPL CALCULATED.3IONS-SCNC: 7 MMOL/L (ref 4–13)
BASOPHILS # BLD AUTO: 0.06 THOUSANDS/ΜL (ref 0–0.1)
BASOPHILS NFR BLD AUTO: 1 % (ref 0–1)
BUN SERPL-MCNC: 17 MG/DL (ref 5–25)
CALCIUM SERPL-MCNC: 8.7 MG/DL (ref 8.3–10.1)
CHLORIDE SERPL-SCNC: 113 MMOL/L (ref 100–108)
CO2 SERPL-SCNC: 24 MMOL/L (ref 21–32)
CREAT SERPL-MCNC: 1.17 MG/DL (ref 0.6–1.3)
EOSINOPHIL # BLD AUTO: 0.28 THOUSAND/ΜL (ref 0–0.61)
EOSINOPHIL NFR BLD AUTO: 4 % (ref 0–6)
ERYTHROCYTE [DISTWIDTH] IN BLOOD BY AUTOMATED COUNT: 15.9 % (ref 11.6–15.1)
GFR SERPL CREATININE-BSD FRML MDRD: 43 ML/MIN/1.73SQ M
GLUCOSE SERPL-MCNC: 104 MG/DL (ref 65–140)
GLUCOSE SERPL-MCNC: 115 MG/DL (ref 65–140)
GLUCOSE SERPL-MCNC: 190 MG/DL (ref 65–140)
GLUCOSE SERPL-MCNC: 195 MG/DL (ref 65–140)
HCT VFR BLD AUTO: 29.5 % (ref 34.8–46.1)
HGB BLD-MCNC: 9.1 G/DL (ref 11.5–15.4)
IMM GRANULOCYTES # BLD AUTO: 0.05 THOUSAND/UL (ref 0–0.2)
IMM GRANULOCYTES NFR BLD AUTO: 1 % (ref 0–2)
LYMPHOCYTES # BLD AUTO: 1.47 THOUSANDS/ΜL (ref 0.6–4.47)
LYMPHOCYTES NFR BLD AUTO: 23 % (ref 14–44)
MCH RBC QN AUTO: 27.7 PG (ref 26.8–34.3)
MCHC RBC AUTO-ENTMCNC: 30.8 G/DL (ref 31.4–37.4)
MCV RBC AUTO: 90 FL (ref 82–98)
MONOCYTES # BLD AUTO: 0.73 THOUSAND/ΜL (ref 0.17–1.22)
MONOCYTES NFR BLD AUTO: 12 % (ref 4–12)
NEUTROPHILS # BLD AUTO: 3.72 THOUSANDS/ΜL (ref 1.85–7.62)
NEUTS SEG NFR BLD AUTO: 59 % (ref 43–75)
NRBC BLD AUTO-RTO: 0 /100 WBCS
PLATELET # BLD AUTO: 185 THOUSANDS/UL (ref 149–390)
PMV BLD AUTO: 11.2 FL (ref 8.9–12.7)
POTASSIUM SERPL-SCNC: 3.8 MMOL/L (ref 3.5–5.3)
RBC # BLD AUTO: 3.29 MILLION/UL (ref 3.81–5.12)
SODIUM SERPL-SCNC: 144 MMOL/L (ref 136–145)
WBC # BLD AUTO: 6.31 THOUSAND/UL (ref 4.31–10.16)

## 2021-03-22 PROCEDURE — 97116 GAIT TRAINING THERAPY: CPT

## 2021-03-22 PROCEDURE — 99222 1ST HOSP IP/OBS MODERATE 55: CPT | Performed by: NURSE PRACTITIONER

## 2021-03-22 PROCEDURE — 80048 BASIC METABOLIC PNL TOTAL CA: CPT | Performed by: PHYSICIAN ASSISTANT

## 2021-03-22 PROCEDURE — 82948 REAGENT STRIP/BLOOD GLUCOSE: CPT

## 2021-03-22 PROCEDURE — 85025 COMPLETE CBC W/AUTO DIFF WBC: CPT | Performed by: PHYSICIAN ASSISTANT

## 2021-03-22 PROCEDURE — 97530 THERAPEUTIC ACTIVITIES: CPT

## 2021-03-22 PROCEDURE — 99239 HOSP IP/OBS DSCHRG MGMT >30: CPT | Performed by: PHYSICIAN ASSISTANT

## 2021-03-22 RX ORDER — ACETAMINOPHEN 325 MG/1
975 TABLET ORAL EVERY 8 HOURS SCHEDULED
Status: DISCONTINUED | OUTPATIENT
Start: 2021-03-22 | End: 2021-03-22 | Stop reason: HOSPADM

## 2021-03-22 RX ORDER — FUROSEMIDE 20 MG/1
20 TABLET ORAL DAILY
Refills: 0
Start: 2021-03-24

## 2021-03-22 RX ADMIN — INSULIN LISPRO 1 UNITS: 100 INJECTION, SOLUTION INTRAVENOUS; SUBCUTANEOUS at 17:12

## 2021-03-22 RX ADMIN — APIXABAN 5 MG: 5 TABLET, FILM COATED ORAL at 17:08

## 2021-03-22 RX ADMIN — PRAVASTATIN SODIUM 40 MG: 40 TABLET ORAL at 17:08

## 2021-03-22 RX ADMIN — PREDNISONE 2.5 MG: 2.5 TABLET ORAL at 08:53

## 2021-03-22 RX ADMIN — CLOPIDOGREL BISULFATE 75 MG: 75 TABLET ORAL at 08:52

## 2021-03-22 RX ADMIN — AMIODARONE HYDROCHLORIDE 400 MG: 200 TABLET ORAL at 08:52

## 2021-03-22 RX ADMIN — INSULIN LISPRO 1 UNITS: 100 INJECTION, SOLUTION INTRAVENOUS; SUBCUTANEOUS at 11:38

## 2021-03-22 RX ADMIN — APIXABAN 5 MG: 5 TABLET, FILM COATED ORAL at 08:52

## 2021-03-22 RX ADMIN — METOPROLOL SUCCINATE 175 MG: 100 TABLET, EXTENDED RELEASE ORAL at 08:52

## 2021-03-22 NOTE — ASSESSMENT & PLAN NOTE
· Hemoglobin on admission 12 1 -> 9 2 -> 9 2 -> 9 1 today   · Patient had significant blood loss from scalp laceration, received 1U PRBCs in trauma bay   · Also consider component of hemodilution in the setting of fluid resuscitation on admission   · CTH on admission without intracranial hemorrhage   · Monitor closely

## 2021-03-22 NOTE — ASSESSMENT & PLAN NOTE
No results found for: HGBA1C    Recent Labs     03/21/21  1606 03/21/21  2108 03/22/21  0547 03/22/21  1035   POCGLU 133 146* 115 190*       Blood Sugar Average: Last 72 hrs:  (P) 163 [id number]  · Resume PTA medical management at discharge   · Noted on chronic steroids for dermatologic purposes and prednisone is being weaned in the outpatient setting

## 2021-03-22 NOTE — PROGRESS NOTES
[facility]  Progress Note - Leia Lagos 1935, 81 y o  female MRN: [id number]  Unit/Bed#: CW2 213-02 Encounter: [id number]  Primary Care Provider: Natividad Anna DO   Date and time admitted to hospital: 3/19/2021 10:23 AM    * Near syncope  Assessment & Plan  · Patient with noted history of recurrent syncope/presyncope  Prior admission in July 2020 felt to 2/2 dehydration given concurrent RAYMUNDO and hyponatremia  Noted history of VT s/p ICD in July 2020  · Hypotensive on admission, BP improved s/p IVF   · ? Symptomatic carotid stenosis (see related plan)  · ICD interrogation without events   · Orthostatics negative     · Telemetry review unremarkable and has been discontinued   · Repeat echo: Systolic function was normal  Ejection fraction was estimated to be 55 %  Although no diagnostic regional wall motion abnormality was identified, this possibility cannot be completely excluded on the basis of this study  No significant valvular abnormalities     · Appreciate cardiology input; patient cleared for discharge from cardiac perspective, recommended holding Lasix x 2 days and resuming 20 mg daily dose with close outpatient follow-up with primary cardiologist for ongoing management   · PT/OT recommending home therapy     RAYMUNDO (acute kidney injury) ([facility] [address] )  Assessment & Plan  · POA with creatinine 1 50, baseline creatinine approximately 0 7-0 9  · Suspect due to hypotension and diuretic use   · Creatinine improved s/p blood transfusion and IVF - 1 17 today   · Appreciate cariology input regarding diuretics   · Avoid nephrotoxins or further hypotension  · Monitor BMP    Anemia  Assessment & Plan  · Hemoglobin on admission 12 1 -> 9 2 -> 9 2 -> 9 1 today   · Patient had significant blood loss from scalp laceration, received 1U PRBCs in trauma bay   · Also consider component of hemodilution in the setting of fluid resuscitation on admission   · [address] [facility] [address] on admission without intracranial hemorrhage   · Monitor closely     History of ventricular tachycardia  Assessment & Plan  · S/p ICD in July 2020  · Follows with [facility] cardiology   · Continue home dose amiodarone 400 mg daily, Toprol- mg daily   · ICD interrogation without events   · Telemetry review unremarkable     Cardiomyopathy [facility])  Assessment & Plan  · Follows with [facility] cardiology  · Most recent echo July 2020 with LVEF 50% and without RWMA  · Repeat echo essentially unchanged from prior  · Appreciate cardiology input  · Recommending hold home dose Lasix x 3 days and resume 20 mg daily thereafter with close outpatient follow-up  · Monitor daily weights, intake and output  · Cardiac diet    Bilateral carotid artery stenosis  Assessment & Plan  · Noted history of L ICA stent  · September 2017 R ICA with 70-79% stenosis   · Repeat carotid US with stable disease  · Continue statin, Plavix    · Outpatient follow-up     Atrial fibrillation (HCC)  Assessment & Plan  · Rate controlled on metoprolol and amiodarone   · AC with Eliquis    CAD (coronary artery disease)  Assessment & Plan  · Status post PCI x3  · Troponin negative and EKG without ischemic changes   · Continue statin, Plavix, beta-blocker    History of pulmonary embolus (PE)  Assessment & Plan  · AC with Eliquis    Bullous pemphigoid  Assessment & Plan  · Noted history and follows with dermatology as outpatient   · Continue with prednisone 2 5 mg daily     Fall  Assessment & Plan  · Trauma workup unremarkable aside from posterior scalp laceration as mentioned  · PT/OT recommending home therapy; plan for patient to stay with her daughter 24/7 for the short term     Laceration of head, subsequent encounter  Assessment & Plan  · Presented as a trauma alert status post fall at home  · CT head on admission without acute intracranial abnormality or hemorrhage  · Posterior head laceration repaired with 6 sutures on day of admission 03/19 - recommend follow-up with PCP or return to ED for removal of sutures around 10 days (3/29/21)  · Appreciate ongoing trauma recommendations    Type 2 diabetes mellitus, without long-term current use of insulin [facility])  Assessment & Plan  No results found for: HGBA1C    Recent Labs     03/21/21  1606 03/21/21  2108 03/22/21  0547 03/22/21  1035   POCGLU 133 146* 115 190*       Blood Sugar Average: Last 72 hrs:  (P) 163 [id number]  · Resume PTA medical management at discharge   · Noted on chronic steroids for dermatologic purposes and prednisone is being weaned in the outpatient setting     Discharging Physician / Practitioner: Mitzy Saleh PA-C  PCP: Roxana Nunez DO  Admission Date:   Admission Orders (From admission, onward)     Ordered        03/19/21 1728  Inpatient Admission  Once                   Discharge Date: 03/22/21    Resolved Problems  Date Reviewed: 3/22/2021    None          Consultations During Hospital Stay:  · Cardiology    Procedures Performed:   · Echocardiogram    Significant Findings / Test Results:   · Chest x-ray on 03/19: No acute cardiopulmonary disease within limitations of supine imaging  · CT cervical spine: No cervical spine fracture or traumatic malalignment  · CT head:  No acute intracranial abnormality  · Echocardiogram: Systolic function was normal  Ejection fraction was estimated to be 55 %  Although no diagnostic regional wall motion abnormality was identified, this possibility cannot be completely excluded on the basis of this study  No significant valvular abnormalities  · Carotid ultrasound stable greater than 70% stenosis noted in the right internal carotid artery  · ICD interrogation without events    Incidental Findings:   · None     Test Results Pending at Discharge (will require follow up):    · None     Outpatient Tests Requested:  · Outpatient follow-up with patient's primary cardiologist with [facility]  · Outpatient follow-up with PCP    Complications:  None    Reason for Admission: Presyncope, fall with head trauma    Hospital Course:     Nidia Rousseau is a 81 y o  female patient who originally presented to the hospital on 3/19/2021 as a trauma alert due to fall with head trauma and posterior scalp laceration  Patient was evaluated by Trauma in the ED  She had significant blood loss from her occipital scalp laceration requiring 1 unit PRBC on admission  The laceration was repaired with 6 sutures on admission, date 03/19/2021  No other significant traumatic injuries present at the time of admission  Patient reported feeling lightheaded/dizzy prior to fall and was admitted for further workup  Noted history of recurrent falls with similar symptoms prior, patient also with history of ventricular tachycardia and atrial fibrillation status post ICD  Given her significant cardiac history patient was seen in consultation by Cardiology  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 and is therefore very sensitive to dehydration  Repeat echocardiogram was completed with results outlined above but essentially stable compared to prior  Given history of bilateral carotid artery stenosis patient underwent repeat carotid ultrasound which showed stable >70% stenosis in the right internal carotid artery  ICD interrogation without events  No events on telemetry  Suspected that presyncopal symptoms likely related to dehydration in the setting of diuretic use  Blood pressure, RAYMUNDO and symptoms resolved with IV fluid resuscitation  Cardiology recommended continuing to hold Lasix for additional 2 days after discharge and resume at 20 mg daily  I advised patient to call her primary cardiologist at [facility] this week for additional recommendations regarding her diuretic  Patient was evaluated by PT/OT and recommended home therapy  Plan is for patient to be discharged to stay with her daughter 24/7 for the short term until strength and functionality improved        Please see above list of diagnoses and related plan for additional information  Condition at Discharge: fair     Discharge Day Visit / Exam:     Subjective:  Patient reports still feeling uncomfortable when trying to lay down and sleep at night due to her scalp laceration  Denies further lightheadedness/dizziness since admission  Agreeable to discharge today  Vitals: Blood Pressure: 114/53 (03/22/21 1531)  Pulse: 60 (03/22/21 1531)  Temperature: 98 6 °F (37 °C) (03/22/21 1531)  Temp Source: Oral (03/22/21 1531)  Respirations: 16 (03/22/21 1531)  Weight - Scale: 61 9 kg (136 lb 6 oz) (03/22/21 0600)  SpO2: 95 % (03/22/21 1531)  Exam:   Physical Exam  Vitals signs and nursing note reviewed  Constitutional:       General: She is not in acute distress  Cardiovascular:      Rate and Rhythm: Normal rate and regular rhythm  Pulses: Normal pulses  Pulmonary:      Effort: Pulmonary effort is normal  No respiratory distress  Breath sounds: No wheezing or rales  Abdominal:      General: Abdomen is flat  There is no distension  Palpations: Abdomen is soft  Tenderness: There is no abdominal tenderness  Musculoskeletal:      Right lower leg: No edema  Left lower leg: No edema  Comments: Occipital scalp laceration status post suture repair without active bleeding or drainage   Skin:     General: Skin is warm and dry  Coloration: Skin is not pale  Findings: No erythema  Neurological:      Mental Status: She is alert and oriented to person, place, and time  Mental status is at baseline  Discussion with Family:  Discussed with patient's daughter via telephone    Discharge instructions/Information to patient and family:   See after visit summary for information provided to patient and family  Provisions for Follow-Up Care:  See after visit summary for information related to follow-up care and any pertinent home health orders        Disposition:     Home with VNA Services (Reminder: Complete face to face encounter)    For Discharges to [facility] SNF:   · Not Applicable to this Patient - Not Applicable to this Patient    Planned Readmission: no     Discharge Statement:  I spent 35 minutes discharging the patient  This time was spent on the day of discharge  I had direct contact with the patient on the day of discharge  Greater than 50% of the total time was spent examining patient, answering all patient questions, arranging and discussing plan of care with patient as well as directly providing post-discharge instructions  Additional time then spent on discharge activities  Discharge Medications:  See after visit summary for reconciled discharge medications provided to patient and family        ** Please Note: This note has been constructed using a voice recognition system **

## 2021-03-22 NOTE — PLAN OF CARE
Problem: PHYSICAL THERAPY ADULT  Goal: Performs mobility at highest level of function for planned discharge setting  See evaluation for individualized goals  Description: Treatment/Interventions: Functional transfer training, LE strengthening/ROM, Therapeutic exercise, Elevations, Endurance training, Patient/family training, Equipment eval/education, Bed mobility, Gait training, Compensatory technique education, Continued evaluation, Spoke to MD  Equipment Recommended: Walker(RW)       See flowsheet documentation for full assessment, interventions and recommendations  Outcome: Adequate for Discharge  Note: Prognosis: Good  Problem List: Impaired balance, Decreased mobility, Decreased safety awareness, Decreased skin integrity, Pain  Assessment: pt cleared for d/c home to temp S/A at daughters home  Pt was able to complete all xfers w/ S from varied surfaces and completed stairs training w/ R and supervision w/o difficulty- pt denies dizziness or HA or visual changes throughout  at times is noted to be a bit impulsive however this is most likely baseline  PT is recommnding use of RW on d/c until progressed by home PT; fit to drive eval  Pt was educated in fall prevention strategies including placing self against wall or on floor when her reported "dizzy spells" come on to prevent traumatic fall  pt expressing understanding of same  also recommended Life Alert  pt agreable to consider and will speak w/ daughter re same  pt reports her daughter has been  asking her to consider a Life Alert for some time now  Barriers to Discharge: None  Barriers to Discharge Comments: pt reprots will have temp  24/7 of supportive daughter on d/c   PT Discharge Recommendation: Home with skilled therapy     PT - OK to Discharge: (S) Yes    See flowsheet documentation for full assessment

## 2021-03-22 NOTE — DISCHARGE INSTR - AVS FIRST PAGE
Please call your primary cardiologist with [facility] to schedule follow-up appointment this week  Continue to hold Lasix (furosemide) until Wednesday 3/24 then resume at 20 mg daily until further instruction from your cardiologist     Esmer Gary should be removed on Monday 3/29  This can be done at PCP office or you may present to the ED to have stitches removed at that time

## 2021-03-22 NOTE — CONSULTS
Consultation - Geriatrics   Bessy Rodriguez 81 y o  female MRN: [id number]  Unit/Bed#: CW2 213-02 Encounter: [id number]      Assessment/Plan  1  Ambulatory dysfunction  Hx of previous falls  Pt states all falls related to dizziness  recommend check albumin   Fall precautions  PT/OT    2  Cognitive decline  C/o issues with memory  Mother with hx of dementia  Recommend check TSH and vitamin B12  CT head done 3/19/21: PARENCHYMA: Decreased attenuation is noted in periventricular and subcortical white matter demonstrating an appearance that is statistically most likely to represent mild microangiopathic change  No CT signs of acute infarction  No intracranial mass, mass effect or midline shift  No acute parenchymal hemorrhage    3  Acute pain due to trauma  Geriatric pain protocol  Scheduled acetaminophen 975 mg po Q8  Consider scheduled oxycodone 2 5 mg po Q4 prn if further medication needed    4  Insomnia  Related to hospitalization  First line is behavioral therapy  Avoid sedative hypnotics such as benzodiazepines and benadryl  Encourage staying awake during the day  Encourage daytime activity, morning exercise  Decrease or eliminate day time naps  Avoid caffiene, alcohol especially during late afternoon and evening hours  Establish a night time routine  Recommend melatonin 3mg po QHS    5  Delirium precautions  Alert and oriented x3  Provide frequent redirection, reorientation, distraction techniques  Avoid deliriogenic medications such as tramadol, benzodiazepines, anticholinergics,  Benadryl  Treat pain, See geriatric pain protocol  Monitor for constipation and urinary retention  Encourage early and frequent moblization, OOB  Encourage Hydration/ Nutrition  Implement sleep hygiene, limit night time interuptions, group activities    6   Frailty  Moderate  Risk factors: hospitalization, polypharmacy, limited caregiver support, ambulatory dysfunction  Maintain protein in diet  PT/OT  Daughter to stay with patient 24/7 on discharge    7  Laceration of head  sutures  Local wound care  Trauma following    8  Home medication review  [facility]  x 8, per [facility] pharmacy computed system down, unable to confirm medication at present time  Pharmacy to fax list of medications when computer system is operational      History of Present Illness   Physician Requesting Consult: Omar Trent MD  Reason for Consult / Principal Problem: fall  Hx and PE limited by: NA  HPI: Chet Pinzon is a 81y o  year old female who presents with fall  She was in her bathroom and began to feel dizzy  She fell and hit her head  She has DM, HTN  Hx of SSS, AICD    Prior to arrival she lives at home  She does not drive  She states her daughter takes care of bills, medications, shopping, cleaning and laundry  She is independent with ADLs  She ambulates with a cane a home  She has hx of previous falls  She wears glasses for reading  She denies issues with hearing  She denies weight loss  She denies issues with sleep, urinary frequency or incontinence  She has occasional issues with memory  She states she used to remember all her medications but now she cant  Upon exam pt is sitting on the edge of the bed  She is alert and reined x4  She states its hard to be in the hospital, you are not allowed to walk around, they put an alarm on you then you get weaker  She asks if her admission is classified as inpatient or outpatient  Inpatient consult to Gerontology  Consult performed by: ARLEEN Lee  Consult ordered by: Los Delatorre PA-C          Review of Systems   Constitutional: Negative for unexpected weight change  HENT: Negative for hearing loss  Eyes: Negative for visual disturbance  Respiratory: Negative for cough  Cardiovascular: Negative for chest pain  Gastrointestinal: Negative for constipation  Genitourinary: Negative for difficulty urinating  Musculoskeletal: Positive for gait problem     Skin: Negative for color change  Neurological: Negative for dizziness  Psychiatric/Behavioral: Positive for sleep disturbance  Historical Information   Past Medical History:   Diagnosis Date    Diabetes mellitus ([address] )     Hypertension      Past Surgical History:   Procedure Laterality Date    TONSILLECTOMY       Social History   Social History     Substance and Sexual Activity   Alcohol Use Not Currently     Social History     Substance and Sexual Activity   Drug Use Never     Social History     Tobacco Use   Smoking Status Never Smoker   Smokeless Tobacco Never Used         Family History:   Family History   Problem Relation Age of Onset    Heart disease Father     Hypertension Sister     Lupus Sister     Cancer Daughter     Stroke Daughter     Dementia Mother        Meds/Allergies   Current meds:   Current Facility-Administered Medications   Medication Dose Route Frequency    acetaminophen (TYLENOL) tablet 975 mg  975 mg Oral Q8H [address]    amiodarone tablet 400 mg  400 mg Oral Daily    apixaban (ELIQUIS) tablet 5 mg  5 mg Oral BID    clopidogrel (PLAVIX) tablet 75 mg  75 mg Oral Daily    insulin lispro (HumaLOG) 100 units/mL subcutaneous injection 1-5 Units  1-5 Units Subcutaneous TID AC    insulin lispro (HumaLOG) 100 units/mL subcutaneous injection 1-5 Units  1-5 Units Subcutaneous HS    metoprolol succinate (TOPROL-XL) 24 hr tablet 175 mg  175 mg Oral Daily    ondansetron (ZOFRAN) injection 4 mg  4 mg Intravenous Q6H PRN    pravastatin (PRAVACHOL) tablet 40 mg  40 mg Oral After Dinner    predniSONE tablet 2 5 mg  2 5 mg Oral Daily           Allergies   Allergen Reactions    Losartan Anaphylaxis    Sulfa Antibiotics Anaphylaxis       Objective   Vitals: Blood pressure 114/51, pulse 60, temperature 98 °F (36 7 °C), temperature source Oral, resp  rate 18, weight 61 9 kg (136 lb 6 oz), SpO2 94 %  ,There is no height or weight on file to calculate BMI        Physical Exam  Vitals signs and nursing note reviewed  HENT:      Head: Normocephalic  Nose: Nose normal       Mouth/Throat:      Mouth: Mucous membranes are dry  Eyes:      General:         Right eye: No discharge  Left eye: No discharge  Neck:      Musculoskeletal: Normal range of motion  Cardiovascular:      Rate and Rhythm: Normal rate  Pulses: Normal pulses  Pulmonary:      Effort: No respiratory distress  Breath sounds: Normal breath sounds  Abdominal:      General: Bowel sounds are normal       Palpations: Abdomen is soft  Musculoskeletal: Normal range of motion  Skin:     General: Skin is warm and dry  Comments: Sutures to posterior head CDI   Neurological:      Mental Status: She is alert and oriented to person, place, and time  Mental status is at baseline  Psychiatric:         Mood and Affect: Mood normal          Lab Results:   Results from last 7 days   Lab Units 03/22/21  0455   WBC Thousand/uL 6 31   HEMOGLOBIN g/dL 9 1*   HEMATOCRIT % 29 5*   PLATELETS Thousands/uL 185        Results from last 7 days   Lab Units 03/22/21  0455  03/19/21  1033   POTASSIUM mmol/L 3 8   < >  --    CHLORIDE mmol/L 113*   < >  --    CO2 mmol/L 24   < >  --    CO2, I-STAT mmol/L  --   --  26   BUN mg/dL 17   < >  --    CREATININE mg/dL 1 17   < >  --    CALCIUM mg/dL 8 7   < >  --    GLUCOSE, ISTAT mg/dl  --   --  163*    < > = values in this interval not displayed  Imaging Studies: I have personally reviewed pertinent reports  EKG, Pathology, and Other Studies: I have personally reviewed pertinent reports      VTE Prophylaxis: Sequential compression device (Venodyne)     Code Status: Level 1 - Full Code

## 2021-03-22 NOTE — CASE MANAGEMENT
CM received message from OT that pt does not need a cog eval if discharging home with family and 24 hour supervision  CM attempted to contact pt's daughter, Twan Moura ([phone]) however she did not answer and her voicemail was not set up  CM unable to leave voicemail  CM to continue to attempt to reach pt's daughter to confirm discharge plan  1230    CM again attempted to contact pt's daughter however there was no answer  CM received call back from phone number listed however it was a wrong phone number, not pt's daughter  CM met with pt at bedside who informed that she is unsure of pt's daughter's phone number  In the chart, her daughter's phone number was listed as [phone], pt's daughter answered  She confirmed her address as [address] [address]  CM informed [facility] of address change, they reported that they are still able to accept with the change of address  Pt's daughter to provide transportation once pt is medically cleared for discharge

## 2021-03-22 NOTE — ASSESSMENT & PLAN NOTE
· Trauma workup unremarkable aside from posterior scalp laceration as mentioned  · PT/OT recommending home therapy; plan for patient to stay with her daughter 24/7 for the short term

## 2021-03-22 NOTE — ASSESSMENT & PLAN NOTE
· Patient with noted history of recurrent syncope/presyncope  Prior admission in July 2020 felt to 2/2 dehydration given concurrent RAYMUNDO and hyponatremia  Noted history of VT s/p ICD in July 2020  · Hypotensive on admission, BP improved s/p IVF   · ? Symptomatic carotid stenosis (see related plan)  · ICD interrogation without events   · Orthostatics negative     · Telemetry review unremarkable and has been discontinued   · Repeat echo: Systolic function was normal  Ejection fraction was estimated to be 55 %  Although no diagnostic regional wall motion abnormality was identified, this possibility cannot be completely excluded on the basis of this study  No significant valvular abnormalities     · Appreciate cardiology input; patient cleared for discharge from cardiac perspective, recommended holding Lasix x 2 days and resuming 20 mg daily dose with close outpatient follow-up with primary cardiologist for ongoing management   · PT/OT recommending home therapy

## 2021-03-22 NOTE — DISCHARGE SUMMARY
[address] [facility]  Discharge- Alireza Magaña 1935, 81 y o  female MRN: [id number]  Unit/Bed#: Raz Yang 213-02 Encounter: [id number]  Primary Care Provider: Roxana Nunez DO   Date and time admitted to hospital: 3/19/2021 10:23 AM    * Near syncope  Assessment & Plan  · Patient with noted history of recurrent syncope/presyncope  Prior admission in July 2020 felt to 2/2 dehydration given concurrent RAYMUNDO and hyponatremia  Noted history of VT s/p ICD in July 2020  · Hypotensive on admission, BP improved s/p IVF   · ? Symptomatic carotid stenosis (see related plan)  · ICD interrogation without events   · Orthostatics negative     · Telemetry review unremarkable and has been discontinued   · Repeat echo: Systolic function was normal  Ejection fraction was estimated to be 55 %  Although no diagnostic regional wall motion abnormality was identified, this possibility cannot be completely excluded on the basis of this study  No significant valvular abnormalities     · Appreciate cardiology input; patient cleared for discharge from cardiac perspective, recommended holding Lasix x 2 days and resuming 20 mg daily dose with close outpatient follow-up with primary cardiologist for ongoing management   · PT/OT recommending home therapy     RAYMUNDO (acute kidney injury) ([facility] [address] )  Assessment & Plan  · POA with creatinine 1 50, baseline creatinine approximately 0 7-0 9  · Suspect due to hypotension and diuretic use   · Creatinine improved s/p blood transfusion and IVF - 1 17 today   · Appreciate cariology input regarding diuretics   · Avoid nephrotoxins or further hypotension  · Monitor BMP    Anemia  Assessment & Plan  · Hemoglobin on admission 12 1 -> 9 2 -> 9 2 -> 9 1 today   · Patient had significant blood loss from scalp laceration, received 1U PRBCs in trauma bay   · Also consider component of hemodilution in the setting of fluid resuscitation on admission   · [facility] on admission without intracranial hemorrhage   · Monitor closely     History of ventricular tachycardia  Assessment & Plan  · S/p ICD in July 2020  · Follows with [facility] cardiology   · Continue home dose amiodarone 400 mg daily, Toprol- mg daily   · ICD interrogation without events   · Telemetry review unremarkable     Cardiomyopathy [facility])  Assessment & Plan  · Follows with [facility] cardiology  · Most recent echo July 2020 with LVEF 50% and without RWMA  · Repeat echo essentially unchanged from prior  · Appreciate cardiology input  · Recommending hold home dose Lasix x 3 days and resume 20 mg daily thereafter with close outpatient follow-up  · Monitor daily weights, intake and output  · Cardiac diet    Bilateral carotid artery stenosis  Assessment & Plan  · Noted history of L ICA stent  · September 2017 R ICA with 70-79% stenosis   · Repeat carotid US with stable disease  · Continue statin, Plavix    · Outpatient follow-up     Atrial fibrillation (HCC)  Assessment & Plan  · Rate controlled on metoprolol and amiodarone   · AC with Eliquis    CAD (coronary artery disease)  Assessment & Plan  · Status post PCI x3  · Troponin negative and EKG without ischemic changes   · Continue statin, Plavix, beta-blocker    History of pulmonary embolus (PE)  Assessment & Plan  · AC with Eliquis    Bullous pemphigoid  Assessment & Plan  · Noted history and follows with dermatology as outpatient   · Continue with prednisone 2 5 mg daily     Fall  Assessment & Plan  · Trauma workup unremarkable aside from posterior scalp laceration as mentioned  · PT/OT recommending home therapy; plan for patient to stay with her daughter 24/7 for the short term     Laceration of head, subsequent encounter  Assessment & Plan  · Presented as a trauma alert status post fall at home  · CT head on admission without acute intracranial abnormality or hemorrhage  · Posterior head laceration repaired with 6 sutures on day of admission 03/19 - recommend follow-up with PCP or return to ED for removal of sutures around 10 days (3/29/21)  · Appreciate ongoing trauma recommendations    Type 2 diabetes mellitus, without long-term current use of insulin [facility])  Assessment & Plan  No results found for: HGBA1C    Recent Labs     03/21/21  1606 03/21/21  2108 03/22/21  0547 03/22/21  1035   POCGLU 133 146* 115 190*       Blood Sugar Average: Last 72 hrs:  (P) 163 [id number]  · Resume PTA medical management at discharge   · Noted on chronic steroids for dermatologic purposes and prednisone is being weaned in the outpatient setting     Discharging Physician / Practitioner: Naya Moncada PA-C  PCP: Rebeca Chirinos DO  Admission Date:       Admission Orders (From admission, onward)              Ordered          03/19/21 1728   Inpatient Admission  Once                       Discharge Date: 03/22/21          Resolved Problems  Date Reviewed: 3/22/2021     None             Consultations During Hospital Stay:  · Cardiology     Procedures Performed:   · Echocardiogram     Significant Findings / Test Results:   · Chest x-ray on 03/19: No acute cardiopulmonary disease within limitations of supine imaging  · CT cervical spine: No cervical spine fracture or traumatic malalignment  · CT head:  No acute intracranial abnormality  · Echocardiogram: Systolic function was normal  Ejection fraction was estimated to be 55 %  Although no diagnostic regional wall motion abnormality was identified, this possibility cannot be completely excluded on the basis of this study  No significant valvular abnormalities  · Carotid ultrasound stable greater than 70% stenosis noted in the right internal carotid artery  · ICD interrogation without events     Incidental Findings:   · None      Test Results Pending at Discharge (will require follow up):    · None     Outpatient Tests Requested:  · Outpatient follow-up with patient's primary [facility] with [facility]  · Outpatient follow-up with PCP     Complications: None     Reason for Admission:  Presyncope, fall with head trauma     Hospital Course:      Nick Raines is a 81 y o  female patient who originally presented to the hospital on 3/19/2021 as a trauma alert due to fall with head trauma and posterior scalp laceration  Patient was evaluated by Trauma in the ED  She had significant blood loss from her occipital scalp laceration requiring 1 unit PRBC on admission  The laceration was repaired with 6 sutures on admission, date 03/19/2021  No other significant traumatic injuries present at the time of admission  Patient reported feeling lightheaded/dizzy prior to fall and was admitted for further workup  Noted history of recurrent falls with similar symptoms prior, patient also with history of ventricular tachycardia and atrial fibrillation status post ICD  Given her significant cardiac history patient was seen in consultation by Cardiology  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 and is therefore very sensitive to dehydration  Repeat echocardiogram was completed with results outlined above but essentially stable compared to prior  Given history of bilateral carotid artery stenosis patient underwent repeat carotid ultrasound which showed stable >70% stenosis in the right internal carotid artery  ICD interrogation without events  No events on telemetry  Suspected that presyncopal symptoms likely related to dehydration in the setting of diuretic use  Blood pressure, RAYMUNDO and symptoms resolved with IV fluid resuscitation  Cardiology recommended continuing to hold Lasix for additional 2 days after discharge and resume at 20 mg daily  I advised patient to call her primary cardiologist at [facility] this week for additional recommendations regarding her diuretic  Patient was evaluated by PT/OT and recommended home therapy    Plan is for patient to be discharged to stay with her daughter 24/7 for the short term until strength and functionality improved        Please see above list of diagnoses and related plan for additional information       Condition at Discharge: fair      Discharge Day Visit / Exam:      Subjective:  Patient reports still feeling uncomfortable when trying to lay down and sleep at night due to her scalp laceration  Denies further lightheadedness/dizziness since admission  Agreeable to discharge today  Vitals: Blood Pressure: 114/53 (03/22/21 1531)  Pulse: 60 (03/22/21 1531)  Temperature: 98 6 °F (37 °C) (03/22/21 1531)  Temp Source: Oral (03/22/21 1531)  Respirations: 16 (03/22/21 1531)  Weight - Scale: 61 9 kg (136 lb 6 oz) (03/22/21 0600)  SpO2: 95 % (03/22/21 1531)  Exam:   Physical Exam  Vitals signs and nursing note reviewed  Constitutional:       General: She is not in acute distress  Cardiovascular:      Rate and Rhythm: Normal rate and regular rhythm  Pulses: Normal pulses  Pulmonary:      Effort: Pulmonary effort is normal  No respiratory distress  Breath sounds: No wheezing or rales  Abdominal:      General: Abdomen is flat  There is no distension  Palpations: Abdomen is soft  Tenderness: There is no abdominal tenderness  Musculoskeletal:      Right lower leg: No edema  Left lower leg: No edema  Comments: Occipital scalp laceration status post suture repair without active bleeding or drainage   Skin:     General: Skin is warm and dry  Coloration: Skin is not pale  Findings: No erythema  Neurological:      Mental Status: She is alert and oriented to person, place, and time  Mental status is at baseline        Discussion with Family:  Discussed with patient's daughter via telephone     Discharge instructions/Information to patient and family:   See after visit summary for information provided to patient and family        Provisions for Follow-Up Care:  See after visit summary for information related to follow-up care and any pertinent home health orders     Disposition:      Home with [facility] Services (Reminder: Complete face to face encounter)     For Discharges to [facility]:   · Not Applicable to this Patient - Not Applicable to this Patient     Planned Readmission: no     Discharge Statement:  I spent 35 minutes discharging the patient  This time was spent on the day of discharge  I had direct contact with the patient on the day of discharge  Greater than 50% of the total time was spent examining patient, answering all patient questions, arranging and discussing plan of care with patient as well as directly providing post-discharge instructions    Additional time then spent on discharge activities      Discharge Medications:  See after visit summary for reconciled discharge medications provided to patient and family        ** Please Note: This note has been constructed using a voice recognition system **

## 2021-03-22 NOTE — PHYSICAL THERAPY NOTE
Physical Therapy Treatment     Patient Name: Chet Pinzon    RTGVK'Y Date: 3/22/2021     Problem List  Principal Problem:    Near syncope  Active Problems:    RAYMUNDO (acute kidney injury) ([facility] )    Cardiomyopathy ([facility] )    History of pulmonary embolus (PE)    Type 2 diabetes mellitus, without long-term current use of insulin (HCC)    Bilateral carotid artery stenosis    CAD (coronary artery disease)    Atrial fibrillation (HCC)    Laceration of head, subsequent encounter    History of ventricular tachycardia    Fall    Bullous pemphigoid    Anemia       Past Medical History  Past Medical History:   Diagnosis Date    Diabetes mellitus ([facility] )     Hypertension         Past Surgical History  History reviewed  No pertinent surgical history  03/22/21 0920   PT Last Visit   PT Visit Date 03/22/21   Pain Assessment   Pain Assessment Tool 0-10   Pain Score 1   Pain Location/Orientation Location: Head;Location: Back  (sutures- pt denies HA)   Pain Onset/Description Onset: Ongoing   Restrictions/Precautions   Weight Bearing Precautions Per Order No   Braces or Orthoses   (none)   General   Chart Reviewed Yes   Cognition   Arousal/Participation Alert; Cooperative   Attention Within functional limits   Orientation Level Oriented X4   Memory Within functional limits   Following Commands Follows all commands and directions without difficulty   Comments pt is pleasant and appropriate throughout session   Subjective   Subjective pt reports now agreeable to d/c to daughters home for temp supervision while recovering-    Bed Mobility   Supine to Sit 7  Independent   Additional items Increased time required   Transfers   Sit to Stand 5  Supervision   Additional items Increased time required   Stand to Sit 5  Supervision   Additional items Increased time required;Verbal cues   Toilet transfer 5  Supervision   Additional items Increased time required;Verbal cues;Standard toilet  (using grab bars)   Additional Comments utilized RW for xfers today for increased safety and balance    Ambulation/Elevation   Gait pattern WNL   Gait Assistance 5  Supervision   Assistive Device Rolling walker   Distance 220 +300 +stairs    Stair Management Assistance 5  Supervision   Additional items Verbal cues   Stair Management Technique One rail R;Step to pattern; Foreward; Sideways   Number of Stairs 10   Additional items   (up w/ R HR then down w/ 2 hands on rail)   Balance   Static Sitting Good   Dynamic Sitting Good   Static Standing Fair +   Dynamic Standing Fair +   Ambulatory Fair +  (rw)   Endurance Deficit   Endurance Deficit No   Activity Tolerance   Activity Tolerance Patient tolerated treatment well   Medical Staff Made Aware yes- RN    Nurse Made Aware yes- cleared for session Loice    Exercises   Balance training  practiced transfers from varied surfaces w/ and w/o arm rest- varied heights w/ S  Education on fal prevention strategies    Assessment   Prognosis Good   Problem List Impaired balance;Decreased mobility; Decreased safety awareness;Decreased skin integrity;Pain   Assessment pt cleared for d/c home to [facility] at daughters home  Pt was able to complete all xfers w/ S from varied surfaces and completed stairs training w/ R and supervision w/o difficulty- pt denies dizziness or HA or visual changes throughout  at times is noted to be a bit impulsive however this is most likely baseline  PT is recommnding use of RW on d/c until progressed by home PT; fit to drive eval  Pt was educated in fall prevention strategies including placing self against wall or on floor when her reported "dizzy spells" come on to prevent traumatic fall  pt expressing understanding of same  also recommended Life Alert  pt agreable to consider and will speak w/ daughter re same  pt reports her daughter has been  asking her to consider a Life Alert for some time now      Barriers to Discharge None   Goals   Patient Goals go home STG Expiration Date 03/31/21   PT Treatment Day 1   Plan   Treatment/Interventions Functional transfer training;LE strengthening/ROM; Elevations; Therapeutic exercise; Endurance training;Cognitive reorientation;Patient/family training;Equipment eval/education; Bed mobility;Gait training; Compensatory technique education;Spoke to nursing;Spoke to case management;Spoke to advanced practitioner;OT   Progress Progressing toward goals   PT Frequency   (3-5x/wk )   Recommendation   PT Discharge Recommendation Home with skilled therapy   Equipment Recommended Walker   PT - OK to Discharge Yes   AM-PAC Basic Mobility Inpatient   Turning in Bed Without Bedrails 4   Lying on Back to Sitting on Edge of Flat Bed 4   Moving Bed to Chair 4   Standing Up From Chair 4   Walk in Room 4   Climb 3-5 Stairs 3   Basic Mobility Inpatient Raw Score 23   Basic Mobility Standardized Score 50 88   Darby Loza, PT

## 2021-03-22 NOTE — ASSESSMENT & PLAN NOTE
· POA with creatinine 1 50, baseline creatinine approximately 0 7-0 9  · Suspect due to hypotension and diuretic use   · Creatinine improved s/p blood transfusion and IVF - 1 17 today   · Appreciate cariology input regarding diuretics   · Avoid nephrotoxins or further hypotension  · Monitor BMP

## 2021-03-22 NOTE — ASSESSMENT & PLAN NOTE
· Presented as a trauma alert status post fall at home  · CT head on admission without acute intracranial abnormality or hemorrhage  · Posterior head laceration repaired with 6 sutures on day of admission 03/19 - recommend follow-up with PCP or return to ED for removal of sutures around 10 days (3/29/21)  · Appreciate ongoing trauma recommendations

## 2021-05-10 ENCOUNTER — TELEPHONE (OUTPATIENT)
Dept: DERMATOLOGY | Facility: CLINIC | Age: 86
End: 2021-05-10

## 2021-05-10 NOTE — TELEPHONE ENCOUNTER
Itchy skin, would like to know if we can send her some kind of itchy medicine for her rash/hives, going on for 2 wk,and is spreading, gave appt for 5/20  Flavio Fall jd

## 2021-05-20 ENCOUNTER — LAB (OUTPATIENT)
Dept: LAB | Facility: CLINIC | Age: 86
End: 2021-05-20
Payer: COMMERCIAL

## 2021-05-20 ENCOUNTER — OFFICE VISIT (OUTPATIENT)
Dept: DERMATOLOGY | Facility: CLINIC | Age: 86
End: 2021-05-20
Payer: COMMERCIAL

## 2021-05-20 ENCOUNTER — TRANSCRIBE ORDERS (OUTPATIENT)
Dept: LAB | Facility: CLINIC | Age: 86
End: 2021-05-20

## 2021-05-20 VITALS — TEMPERATURE: 96.9 F | HEIGHT: 63 IN | BODY MASS INDEX: 23.92 KG/M2 | WEIGHT: 135 LBS

## 2021-05-20 DIAGNOSIS — L12.0 BULLOUS PEMPHIGOID: ICD-10-CM

## 2021-05-20 DIAGNOSIS — L12.0 BULLOUS PEMPHIGOID: Primary | ICD-10-CM

## 2021-05-20 LAB
BASOPHILS # BLD AUTO: 0.07 THOUSANDS/ΜL (ref 0–0.1)
BASOPHILS NFR BLD AUTO: 1 % (ref 0–1)
EOSINOPHIL # BLD AUTO: 0.47 THOUSAND/ΜL (ref 0–0.61)
EOSINOPHIL NFR BLD AUTO: 6 % (ref 0–6)
ERYTHROCYTE [DISTWIDTH] IN BLOOD BY AUTOMATED COUNT: 17 % (ref 11.6–15.1)
HCT VFR BLD AUTO: 34.6 % (ref 34.8–46.1)
HGB BLD-MCNC: 10.2 G/DL (ref 11.5–15.4)
IMM GRANULOCYTES # BLD AUTO: 0.03 THOUSAND/UL (ref 0–0.2)
IMM GRANULOCYTES NFR BLD AUTO: 0 % (ref 0–2)
LYMPHOCYTES # BLD AUTO: 1.84 THOUSANDS/ΜL (ref 0.6–4.47)
LYMPHOCYTES NFR BLD AUTO: 25 % (ref 14–44)
MCH RBC QN AUTO: 24.4 PG (ref 26.8–34.3)
MCHC RBC AUTO-ENTMCNC: 29.5 G/DL (ref 31.4–37.4)
MCV RBC AUTO: 83 FL (ref 82–98)
MONOCYTES # BLD AUTO: 0.96 THOUSAND/ΜL (ref 0.17–1.22)
MONOCYTES NFR BLD AUTO: 13 % (ref 4–12)
NEUTROPHILS # BLD AUTO: 4.1 THOUSANDS/ΜL (ref 1.85–7.62)
NEUTS SEG NFR BLD AUTO: 55 % (ref 43–75)
NRBC BLD AUTO-RTO: 0 /100 WBCS
PLATELET # BLD AUTO: 271 THOUSANDS/UL (ref 149–390)
PMV BLD AUTO: 10.6 FL (ref 8.9–12.7)
RBC # BLD AUTO: 4.18 MILLION/UL (ref 3.81–5.12)
WBC # BLD AUTO: 7.47 THOUSAND/UL (ref 4.31–10.16)

## 2021-05-20 PROCEDURE — 36415 COLL VENOUS BLD VENIPUNCTURE: CPT

## 2021-05-20 PROCEDURE — 99213 OFFICE O/P EST LOW 20 MIN: CPT | Performed by: DERMATOLOGY

## 2021-05-20 PROCEDURE — 85025 COMPLETE CBC W/AUTO DIFF WBC: CPT

## 2021-05-20 PROCEDURE — 83516 IMMUNOASSAY NONANTIBODY: CPT

## 2021-05-20 RX ORDER — CLOBETASOL PROPIONATE 0.5 MG/G
OINTMENT TOPICAL 2 TIMES DAILY
Qty: 60 G | Refills: 4 | Status: SHIPPED | OUTPATIENT
Start: 2021-05-20 | End: 2021-05-20

## 2021-05-20 RX ORDER — CLOBETASOL PROPIONATE 0.5 MG/G
OINTMENT TOPICAL 2 TIMES DAILY
Qty: 60 G | Refills: 4 | Status: SHIPPED | OUTPATIENT
Start: 2021-05-20

## 2021-05-20 NOTE — PATIENT INSTRUCTIONS
Assessment and Plan:  Based on a thorough discussion of this condition and the management approach to it (including a comprehensive discussion of the known risks, side effects and potential benefits of treatment), the patient (family) agrees to implement the following specific plan:   Start Clobetasol Ointment twice a day to all affected areas for 2 weeks straight, [facility]       Follow Up as needed    What is bullous pemphigoid? Bullous pemphigoid is an autoimmune disease, meaning that your own body's immune system attacks components of the skin causing it to form blisters  It normally affects people over [de-identified]years old, but can also appear in young adults  There is no gender preference  What causes bullous pemphigoid? Many times there is no identifiable cause for bullous pemphigoid  Some medications such as anti-PD1 inhibitors used in chemotherapy have been implicated in the development of bullous pemphigoid  Genetic predisposition for autoimmune diseases is also a risk factor  For example, patients with psoriasis, especially those treated with phototherapy, are susceptible  There is also an association with neurological disease  Sometimes, injuries or infections may also trigger bullous pemphigoid  What are the symptoms of bullous pemphigoid? Bullous pemphigoid results in severe itchiness and large fluid-filled blisters that eventually rupture, forming crusted sores  Other symptoms include:   Nonspecific rash for several weeks before blisters appear   Red patches of skin resembling nummular dermatitis   Ring-shaped lesions on the skin    Smaller blisters (vesicles)   Clear or cloudy, yellowish or bloodstained blister fluid   Postinflammatory pigmentation   Milia in healed areas  The symptoms can be localized or widespread over the trunk and mearby limbs  Oral and genital involvement is less common  How do we diagnose bullous pemphigoid?     The presence of typical signs and symptoms of bullous pemphigoid raise suspicion for the disease  Your dermatologist will likely confirm the diagnosis via skin biopsy of an early blister  When subject to direct immunofluorescence studies, antibodies against skin membranes are highlighted  Blood tests can also be done to check for circulating pemphigoid antibodies  How do we treat bullous pemphigoid? Since there is a wide spectrum of severity, many treatment options exist  First, if bullous pemphigoid is a medication reaction, then it is necessary to stop the offending medication  Other options are:   - Potent topical steroids for limited disease   - Moderate potency topical steroids to relieve dryness and itchiness  - Systemic steroids in severe and widespread blistering   - Antibiotics are used if there is a secondary bacterial infection   Most patients with bullous pemphigoid are treated with steroid tablets, usually prednisone  The dose is adjusted until the blisters have stopped appearing, which usually takes several weeks  The dose of prednisone is then slowly reduced over many months or years  As oral steroids have many undesirable side effects, other medications are added to ensure the lowest possible dose (aiming for 5 to 10 mg prednisone daily)  These other medications may include:   Tetracycline antibiotics, such as doxycycline   Nicotinamide   Dapsone   Methotrexate   Azathioprine   Mycophenolate   Intravenous immunoglobulin   Rituximab  If bullous pemphigoid is severe enough, hospitalization and admission to a burn unit may be warranted

## 2021-05-20 NOTE — PROGRESS NOTES
[facility] Follow Up Note    Patient Name: Brian Kan  Encounter Date: 05/20/2021    Today's Chief Concerns:  Mary Watson Concern #1:  Rash       Current Medications:    Current Outpatient Medications:     amiodarone (PACERONE) 400 MG tablet, Take 400 mg by mouth daily, Disp: , Rfl:     apixaban (Eliquis) 5 mg, Take 5 mg by mouth 2 (two) times a day, Disp: , Rfl:     clopidogrel (PLAVIX) 75 mg tablet, Take 75 mg by mouth daily, Disp: , Rfl:     furosemide (LASIX) 20 mg tablet, Take 1 tablet (20 mg total) by mouth daily, Disp: , Rfl: 0    Latanoprost 0 005 % EMUL, Apply 1 drop to eye, Disp: , Rfl:     potassium chloride (K-DUR,KLOR-CON) 10 mEq tablet, Take 10 mEq by mouth daily, Disp: , Rfl:     apixaban (ELIQUIS) 5 mg, Take 2 tablets (10 mg total) by mouth 2 (two) times a day for 7 days, THEN 1 tablet (5 mg total) 2 (two) times a day for 23 days  , Disp: 74 tablet, Rfl: 0    aspirin (ECOTRIN LOW STRENGTH) 81 mg EC tablet, Take 81 mg by mouth daily, Disp: , Rfl:     ASPIRIN 81 PO, Take 81 mg by mouth, Disp: , Rfl:     brimonidine-timolol (COMBIGAN) 0 2-0 5 %, Apply 1 drop to eye every 12 (twelve) hours, Disp: , Rfl:     diltiazem (CARDIZEM CD) 120 mg 24 hr capsule, Take 1 capsule (120 mg total) by mouth daily (Patient not taking: Reported on 5/20/2021), Disp: 30 capsule, Rfl: 0    furosemide (LASIX) 40 mg tablet, Take 40 mg by mouth 2 (two) times a day, Disp: , Rfl:     hydrOXYzine HCL (ATARAX) 10 mg tablet, TAKE ONE TABLET BY MOUTH THREE TIMES A DAY AS NEEDED FOR ITCHING, Disp: , Rfl: 3    metFORMIN (GLUCOPHAGE) 500 mg tablet, Take 500 mg by mouth, Disp: , Rfl:     metFORMIN (GLUCOPHAGE) 500 mg tablet, Take 850 mg by mouth daily with breakfast , Disp: , Rfl:     metoprolol succinate (TOPROL-XL) 100 mg 24 hr tablet, Take 175 mg by mouth daily, Disp: , Rfl:     metoprolol tartrate (LOPRESSOR) 100 mg tablet, Take 1 tablet (100 mg total) by mouth every 12 (twelve) hours (Patient not taking: Reported on 5/20/2021), Disp: 60 tablet, Rfl: 0    mupirocin (BACTROBAN) 2 % ointment, APPLY TWICE DAILY TO SURGICAL SITE FOR 2 WEEKS, Disp: , Rfl: 0    mycophenolate (CELLCEPT) 500 mg tablet, TAKE ONE TABLET BY MOUTH TWICE A DAY FOR 1 WEEK  THEN INCREASE TO 2 TABLETS TWICE DAILY (Patient not taking: Reported on 5/20/2021), Disp: 160 tablet, Rfl: 3    predniSONE 2 5 mg tablet, Take 7 5 mg of prednisone for 30 days; then we will drop to 5 mg for 30 days;  then 5 mg alternating with 2 5 mg for 30 days;  then its 5 every other day for 30 days  (Patient not taking: Reported on 5/20/2021), Disp: 195 tablet, Rfl: 0    predniSONE 2 5 mg tablet, Take 2 5 mg by mouth daily, Disp: , Rfl:     simvastatin (ZOCOR) 20 mg tablet, TAKE ONE-HALF TABLET BY MOUTH EVERY EVENING, Disp: , Rfl: 1    simvastatin (ZOCOR) 20 mg tablet, Take 20 mg by mouth daily at bedtime, Disp: , Rfl:     triamcinolone (KENALOG) 0 1 % ointment, Apply topically twice a day to affected areas (Patient not taking: Reported on 5/20/2021), Disp: 453 6 g, Rfl: 1    CONSTITUTIONAL:   Vitals:    05/20/21 0747   Temp: (!) 96 9 °F (36 1 °C)   TempSrc: Tympanic   Weight: 61 2 kg (135 lb)   Height: 5' 3" (1 6 m)       Specific Alerts:    Have you been seen by a [facility] Dermatologist in the last 3 years? YES    Are you pregnant or planning to become pregnant? N/A    Are you currently or planning to be nursing or breast feeding? N/A    Allergies   Allergen Reactions    Losartan Anaphylaxis    Sulfa Antibiotics Anaphylaxis    Losartan     Sulfa Antibiotics        May we call your Preferred Phone number to discuss your specific medical information? YES    May we leave a detailed message that includes your specific medical information? YES    Have you traveled outside of the [facility] in the past 3 months? No    Do you currently have a pacemaker or defibrillator?  YES, Pacemaker     Do you have any artificial heart valves, joints, plates, screws, rods, stents, pins, etc? YES, stents    - If Yes, were any placed within the last 2 years? Do you require any medications prior to a surgical procedure? No   - If Yes, for which procedure? n/a   - If Yes, what medications to you require? n/a    Are you taking any medications that cause you to bleed more easily ("blood thinners") YES    Have you ever experienced a rapid heartbeat with epinephrine? No    Have you ever been treated with "gold" (gold sodium thiomalate) therapy? No    [facility] can help with wrinkles, "laugh lines," facial volume loss, "double chin," "love handles," age spots, and more  Are you interested in learning today about some of the skin enhancement procedures that we offer? (If Yes, please provide more detail) No    Review of Systems:  Have you recently had or currently have any of the following?     · Fever or chills: No  · Night Sweats: No  · Headaches: No  · Weight Gain: No  · Weight Loss: No  · Blurry Vision: No  · Nausea: No  · Vomiting: No  · Diarrhea: No  · Blood in Stool: No  · Abdominal Pain: No  · Itchy Skin: YES  · Painful Joints: No  · Swollen Joints: No  · Muscle Pain: No  · Irregular Mole: No  · Sun Burn: No  · Dry Skin: YES  · Skin Color Changes: No  · Scar or Keloid: No  · Cold Sores/Fever Blisters: No  · Bacterial Infections/MRSA: No  · Anxiety: No  · Depression: No  · Suicidal or Homicidal Thoughts: No      PSYCH: Normal mood and affect  EYES: Normal conjunctiva  ENT: Normal lips and oral mucosa  CARDIOVASCULAR: No edema  RESPIRATORY: Normal respirations  HEME/LYMPH/IMMUNO:  No regional lymphadenopathy except as noted below in ASSESSMENT AND PLAN BY DIAGNOSIS    FULL ORGAN SYSTEM SKIN EXAM (SKIN)   Hair, Scalp, Ears, Face Normal except as noted below in Assessment   Neck, Cervical Chain Nodes Normal except as noted below in Assessment   Right Arm/Hand/Fingers Normal except as noted below in Assessment   Left Arm/Hand/Fingers Normal except as noted below in Assessment   Chest/Breasts/Axillae Viewed areas Normal except as noted below in Assessment   Abdomen, Umbilicus Normal except as noted below in Assessment   Back/Spine Normal except as noted below in Assessment   Groin/Genitalia/Buttocks Viewed areas Normal except as noted below in Assessment   Right Leg, Foot, Toes Normal except as noted below in Assessment   Left Leg, Foot, Toes Normal except as noted below in Assessment       BULLOUS PEMPHIGOID    Physical Exam:   Anatomic Location Affected: Antecubital fossas  and axilla    Morphological Description:  Scattered erythematous urticaria like patches    Pertinent Positives:   Pertinent Negatives: n/a    Additional History of Present Condition:  Located all over  Present for about 3 weeks  Reports itching and scratching  Currently on Hydrocortisone Ointment BID  Denies any improvement     Assessment and Plan:  Based on a thorough discussion of this condition and the management approach to it (including a comprehensive discussion of the known risks, side effects and potential benefits of treatment), the patient (family) agrees to implement the following specific plan:   Start Clobetasol Ointment twice a day to all affected areas for 2 weeks straight, as neeeded   Labs given to patient, CBC       The new onset of itching with urtiaria like patches is suggestive of disease relapse  Prebullous   I will try potent topical steroid and observe if it will suffice  I am reluctant to place her back on systemic steoid and immunosuppressives unless absolutely necessary  Altenrative treatment such as dupixent may be suggested  What is bullous pemphigoid? Bullous pemphigoid is an autoimmune disease, meaning that your own body's immune system attacks components of the skin causing it to form blisters  It normally affects people over [de-identified]years old, but can also appear in young adults  There is no gender preference      What causes bullous pemphigoid? Many times there is no identifiable cause for bullous pemphigoid  Some medications such as anti-PD1 inhibitors used in chemotherapy have been implicated in the development of bullous pemphigoid  Genetic predisposition for autoimmune diseases is also a risk factor  For example, patients with psoriasis, especially those treated with phototherapy, are susceptible  There is also an association with neurological disease  Sometimes, injuries or infections may also trigger bullous pemphigoid  What are the symptoms of bullous pemphigoid? Bullous pemphigoid results in severe itchiness and large fluid-filled blisters that eventually rupture, forming crusted sores  Other symptoms include:   Nonspecific rash for several weeks before blisters appear   Red patches of skin resembling nummular dermatitis   Ring-shaped lesions on the skin    Smaller blisters (vesicles)   Clear or cloudy, yellowish or bloodstained blister fluid   Postinflammatory pigmentation   Milia in healed areas  The symptoms can be localized or widespread over the trunk and mearby limbs  Oral and genital involvement is less common  How do we diagnose bullous pemphigoid? The presence of typical signs and symptoms of bullous pemphigoid raise suspicion for the disease  Your dermatologist will likely confirm the diagnosis via skin biopsy of an early blister  When subject to direct immunofluorescence studies, antibodies against skin membranes are highlighted  Blood tests can also be done to check for circulating pemphigoid antibodies  How do we treat bullous pemphigoid? Since there is a wide spectrum of severity, many treatment options exist  First, if bullous pemphigoid is a medication reaction, then it is necessary to stop the offending medication   Other options are:   - Potent topical steroids for limited disease   - Moderate potency topical steroids to relieve dryness and itchiness  - Systemic steroids in severe and widespread blistering   - Antibiotics are used if there is a secondary bacterial infection   Most patients with bullous pemphigoid are treated with steroid tablets, usually prednisone  The dose is adjusted until the blisters have stopped appearing, which usually takes several weeks  The dose of prednisone is then slowly reduced over many months or years  As oral steroids have many undesirable side effects, other medications are added to ensure the lowest possible dose (aiming for 5 to 10 mg prednisone daily)  These other medications may include:   Tetracycline antibiotics, such as doxycycline   Nicotinamide   Dapsone   Methotrexate   Azathioprine   Mycophenolate   Intravenous immunoglobulin   Rituximab  If bullous pemphigoid is severe enough, hospitalization and admission to a burn unit may be warranted       Scribe Attestation    I,:  Charis Jensen MA am acting as a scribe while in the presence of the attending physician :       I,:  Ranjana Tejeda MD personally performed the services described in this documentation    as scribed in my presence :

## 2021-05-20 NOTE — LETTER
May 20, 2021     Jus Stewart, [facility] [address] [address]    Patient: Roma Palomino   YOB: 1935   Date of Visit: 5/20/2021       Dear Dr Greg Dia: Thank you for referring Michelle Ramos to me for evaluation  Below are my notes for this consultation  If you have questions, please do not hesitate to call me  I look forward to following your patient along with you  Sincerely,        Anuradha Bruno MD        CC: No Recipients  Anuradha Bruno MD  5/20/2021  5:46 PM  Incomplete  [facility] Follow Up Note    Patient Name: Roma Palomino  Encounter Date: 05/20/2021    Today's Chief Concerns:  Willy Naqvi Concern #1:  Rash       Current Medications:    Current Outpatient Medications:     amiodarone (PACERONE) 400 MG tablet, Take 400 mg by mouth daily, Disp: , Rfl:     apixaban (Eliquis) 5 mg, Take 5 mg by mouth 2 (two) times a day, Disp: , Rfl:     clopidogrel (PLAVIX) 75 mg tablet, Take 75 mg by mouth daily, Disp: , Rfl:     furosemide (LASIX) 20 mg tablet, Take 1 tablet (20 mg total) by mouth daily, Disp: , Rfl: 0    Latanoprost 0 005 % EMUL, Apply 1 drop to eye, Disp: , Rfl:     potassium chloride (K-DUR,KLOR-CON) 10 mEq tablet, Take 10 mEq by mouth daily, Disp: , Rfl:     apixaban (ELIQUIS) 5 mg, Take 2 tablets (10 mg total) by mouth 2 (two) times a day for 7 days, THEN 1 tablet (5 mg total) 2 (two) times a day for 23 days  , Disp: 74 tablet, Rfl: 0    aspirin (ECOTRIN LOW STRENGTH) 81 mg EC tablet, Take 81 mg by mouth daily, Disp: , Rfl:     ASPIRIN 81 PO, Take 81 mg by mouth, Disp: , Rfl:     brimonidine-timolol (COMBIGAN) 0 2-0 5 %, Apply 1 drop to eye every 12 (twelve) hours, Disp: , Rfl:     diltiazem (CARDIZEM CD) 120 mg 24 hr capsule, Take 1 capsule (120 mg total) by mouth daily (Patient not taking: Reported on 5/20/2021), Disp: 30 capsule, Rfl: 0    furosemide (LASIX) 40 mg tablet, Take 40 mg by mouth 2 (two) times a day, Disp: , Rfl:     hydrOXYzine HCL (ATARAX) 10 mg tablet, TAKE ONE TABLET BY MOUTH THREE TIMES A DAY AS NEEDED FOR ITCHING, Disp: , Rfl: 3    metFORMIN (GLUCOPHAGE) 500 mg tablet, Take 500 mg by mouth, Disp: , Rfl:     metFORMIN (GLUCOPHAGE) 500 mg tablet, Take 850 mg by mouth daily with breakfast , Disp: , Rfl:     metoprolol succinate (TOPROL-XL) 100 mg 24 hr tablet, Take 175 mg by mouth daily, Disp: , Rfl:     metoprolol tartrate (LOPRESSOR) 100 mg tablet, Take 1 tablet (100 mg total) by mouth every 12 (twelve) hours (Patient not taking: Reported on 5/20/2021), Disp: 60 tablet, Rfl: 0    mupirocin (BACTROBAN) 2 % ointment, APPLY TWICE DAILY TO SURGICAL SITE FOR 2 WEEKS, Disp: , Rfl: 0    mycophenolate (CELLCEPT) 500 mg tablet, TAKE ONE TABLET BY MOUTH TWICE A DAY FOR 1 WEEK  THEN INCREASE TO 2 TABLETS TWICE DAILY (Patient not taking: Reported on 5/20/2021), Disp: 160 tablet, Rfl: 3    predniSONE 2 5 mg tablet, Take 7 5 mg of prednisone for 30 days; then we will drop to 5 mg for 30 days;  then 5 mg alternating with 2 5 mg for 30 days;  then its 5 every other day for 30 days  (Patient not taking: Reported on 5/20/2021), Disp: 195 tablet, Rfl: 0    predniSONE 2 5 mg tablet, Take 2 5 mg by mouth daily, Disp: , Rfl:     simvastatin (ZOCOR) 20 mg tablet, TAKE ONE-HALF TABLET BY MOUTH EVERY EVENING, Disp: , Rfl: 1    simvastatin (ZOCOR) 20 mg tablet, Take 20 mg by mouth daily at bedtime, Disp: , Rfl:     triamcinolone (KENALOG) 0 1 % ointment, Apply topically twice a day to affected areas (Patient not taking: Reported on 5/20/2021), Disp: 453 6 g, Rfl: 1    CONSTITUTIONAL:   Vitals:    05/20/21 0747   Temp: (!) 96 9 °F (36 1 °C)   TempSrc: Tympanic   Weight: 61 2 kg (135 lb)   Height: 5' 3" (1 6 m)       Specific Alerts:    Have you been seen by a [facility] in the last 3 years? YES    Are you pregnant or planning to become pregnant?  N/A    Are you currently or planning to be nursing or breast feeding? N/A    Allergies   Allergen Reactions    Losartan Anaphylaxis    Sulfa Antibiotics Anaphylaxis    Losartan     Sulfa Antibiotics        May we call your Preferred Phone number to discuss your specific medical information? YES    May we leave a detailed message that includes your specific medical information? YES    Have you traveled outside of the [facility] in the past 3 months? No    Do you currently have a pacemaker or defibrillator? YES, Pacemaker     Do you have any artificial heart valves, joints, plates, screws, rods, stents, pins, etc? YES, stents    - If Yes, were any placed within the last 2 years? Do you require any medications prior to a surgical procedure? No   - If Yes, for which procedure? n/a   - If Yes, what medications to you require? n/a    Are you taking any medications that cause you to bleed more easily ("blood thinners") YES    Have you ever experienced a rapid heartbeat with epinephrine? No    Have you ever been treated with "gold" (gold sodium thiomalate) therapy? No    [facility] can help with wrinkles, "laugh lines," facial volume loss, "double chin," "love handles," age spots, and more  Are you interested in learning today about some of the skin enhancement procedures that we offer? (If Yes, please provide more detail) No    Review of Systems:  Have you recently had or currently have any of the following?     · Fever or chills: No  · Night Sweats: No  · Headaches: No  · Weight Gain: No  · Weight Loss: No  · Blurry Vision: No  · Nausea: No  · Vomiting: No  · Diarrhea: No  · Blood in Stool: No  · Abdominal Pain: No  · Itchy Skin: YES  · Painful Joints: No  · Swollen Joints: No  · Muscle Pain: No  · Irregular Mole: No  · Sun Burn: No  · Dry Skin: YES  · Skin Color Changes: No  · Scar or Keloid: No  · Cold Sores/Fever Blisters: No  · Bacterial Infections/MRSA: No  · Anxiety: No  · Depression: No  · Suicidal or Homicidal Thoughts: No      PSYCH: Normal mood and affect  EYES: Normal conjunctiva  ENT: Normal lips and oral mucosa  CARDIOVASCULAR: No edema  RESPIRATORY: Normal respirations  HEME/LYMPH/IMMUNO:  No regional lymphadenopathy except as noted below in ASSESSMENT AND PLAN BY DIAGNOSIS    FULL ORGAN SYSTEM SKIN EXAM (SKIN)   Hair, Scalp, Ears, Face Normal except as noted below in Assessment   Neck, Cervical Chain Nodes Normal except as noted below in Assessment   Right Arm/Hand/Fingers Normal except as noted below in Assessment   Left Arm/Hand/Fingers Normal except as noted below in Assessment   Chest/Breasts/Axillae Viewed areas Normal except as noted below in Assessment   Abdomen, Umbilicus Normal except as noted below in Assessment   Back/Spine Normal except as noted below in Assessment   Groin/Genitalia/Buttocks Viewed areas Normal except as noted below in Assessment   Right Leg, Foot, Toes Normal except as noted below in Assessment   Left Leg, Foot, Toes Normal except as noted below in Assessment       BULLOUS PEMPHIGOID    Physical Exam:   Anatomic Location Affected: Antecubital fossas  and axilla    Morphological Description:  Scattered erythematous urticaria like patches    Pertinent Positives:   Pertinent Negatives: n/a    Additional History of Present Condition:  Located all over  Present for about 3 weeks  Reports itching and scratching  Currently on Hydrocortisone Ointment BID  Denies any improvement     Assessment and Plan:  Based on a thorough discussion of this condition and the management approach to it (including a comprehensive discussion of the known risks, side effects and potential benefits of treatment), the patient (family) agrees to implement the following specific plan:   Start Clobetasol Ointment twice a day to all affected areas for 2 weeks straight, as neeeded   Labs given to patient, CBC       The new onset of itching with urtiaria like patches is suggestive of disease relapse  Prebullous     I will try potent topical steroid and observe if it will suffice  I am reluctant to place her back on systemic steoid and immunosuppressives unless absolutely necessary  Altenrative treatment such as dupixent may be suggested  What is bullous pemphigoid? Bullous pemphigoid is an autoimmune disease, meaning that your own body's immune system attacks components of the skin causing it to form blisters  It normally affects people over [de-identified]years old, but can also appear in young adults  There is no gender preference  What causes bullous pemphigoid? Many times there is no identifiable cause for bullous pemphigoid  Some medications such as anti-PD1 inhibitors used in chemotherapy have been implicated in the development of bullous pemphigoid  Genetic predisposition for autoimmune diseases is also a risk factor  For example, patients with psoriasis, especially those treated with phototherapy, are susceptible  There is also an association with neurological disease  Sometimes, injuries or infections may also trigger bullous pemphigoid  What are the symptoms of bullous pemphigoid? Bullous pemphigoid results in severe itchiness and large fluid-filled blisters that eventually rupture, forming crusted sores  Other symptoms include:   Nonspecific rash for several weeks before blisters appear   Red patches of skin resembling nummular dermatitis   Ring-shaped lesions on the skin    Smaller blisters (vesicles)   Clear or cloudy, yellowish or bloodstained blister fluid   Postinflammatory pigmentation   Milia in healed areas  The symptoms can be localized or widespread over the trunk and mearby limbs  Oral and genital involvement is less common  How do we diagnose bullous pemphigoid? The presence of typical signs and symptoms of bullous pemphigoid raise suspicion for the disease  Your dermatologist will likely confirm the diagnosis via skin biopsy of an early blister   When subject to direct immunofluorescence studies, antibodies against skin membranes are highlighted  Blood tests can also be done to check for circulating pemphigoid antibodies  How do we treat bullous pemphigoid? Since there is a wide spectrum of severity, many treatment options exist  First, if bullous pemphigoid is a medication reaction, then it is necessary to stop the offending medication  Other options are:   - Potent topical steroids for limited disease   - Moderate potency topical steroids to relieve dryness and itchiness  - Systemic steroids in severe and widespread blistering   - Antibiotics are used if there is a secondary bacterial infection   Most patients with bullous pemphigoid are treated with steroid tablets, usually prednisone  The dose is adjusted until the blisters have stopped appearing, which usually takes several weeks  The dose of prednisone is then slowly reduced over many months or years  As oral steroids have many undesirable side effects, other medications are added to ensure the lowest possible dose (aiming for 5 to 10 mg prednisone daily)  These other medications may include:   Tetracycline antibiotics, such as doxycycline   Nicotinamide   Dapsone   Methotrexate   Azathioprine   Mycophenolate   Intravenous immunoglobulin   Rituximab  If bullous pemphigoid is severe enough, hospitalization and admission to a burn unit may be warranted       Scribe Attestation    I,:  Emerson Moreira MA am acting as a scribe while in the presence of the attending physician :       I,:  Nayely Frnacis MD personally performed the services described in this documentation    as scribed in my presence :

## 2021-06-02 LAB — MISCELLANEOUS LAB TEST RESULT: NORMAL

## 2021-06-02 NOTE — RESULT ENCOUNTER NOTE
I discussed with kim   I note that labs raise question of early recurence  Of pemphigoid  Will use topcicals for now and observe  Morbitidity of systemic agents noted

## 2021-06-08 ENCOUNTER — NURSING HOME VISIT (OUTPATIENT)
Dept: WOUND CARE | Facility: HOSPITAL | Age: 86
End: 2021-06-08
Payer: COMMERCIAL

## 2021-06-08 DIAGNOSIS — R26.2 AMBULATORY DYSFUNCTION: ICD-10-CM

## 2021-06-08 DIAGNOSIS — L89.892 PRESSURE INJURY OF LEFT FOOT, STAGE 2 (HCC): Primary | ICD-10-CM

## 2021-06-08 PROCEDURE — 99305 1ST NF CARE MODERATE MDM 35: CPT | Performed by: NURSE PRACTITIONER

## 2021-06-08 NOTE — LETTER
Patient:  Ginger Arreaga   1935           ARLEEN Corrigan saw Ginger Arreaga for a wound care visit on 6/8/2021  See below for information relating to this visit  Chief Complaint   Patient presents with   [facility] Patient Visit     Left foot ( posterior)        Assessment/Plan:  1  Pressure injury of left foot, stage 2 (HCC)  Assessment & Plan:  Left foot ( posterior)  - fluid filled blister  - local wound care - skin prep  - offload    Orders:  -     Wound cleansing and dressings; Future    2  Ambulatory dysfunction  Assessment & Plan:  - on PT/OT             Orders:  Ginger Arreaga  1935  Orders Placed This Encounter   Procedures    Wound cleansing and dressings     Wound:  Left foot ( posterior)  Discontinue previous wound order  Cleanse the wound bed with NSS  Apply non-sting skin prep to periwound area and wound bed  Frequency : daily and prn for soiling  Offload all wounds  Do not wear shoes that can cause pressure on the wound  Turn and reposition frequently, maximum of every two hours  Increase protein intake   Monitor for any sign of infection or worsening, inform PCP or patient's primary physician in your facility immediately  Standing Status:   Future     Standing Expiration Date:   6/8/2022         Follow Up:  Return in about 1 week (around 6/15/2021)  [facility] hours are 8:00 am - 4:30 pm Monday through Friday  The center phone number is [phone]  You can also contact me directly thru my email at [facility]  [email or web address] or thru tiger text  If it is an emergency, please contact the PCP or patient's attending physician in your facility       Sincerely,    Electronically signed by ARLEEN Corrigan    Patient : Ginger Arreaga    1935

## 2021-06-08 NOTE — PROGRESS NOTES
[address] [facility]NAG[facility]   [facility]       Patient ID: Monae Carbajal is a 81 y o  female Date of Birth 1935     Location of Service: [facility]    Performed wound round with: Unit manager, supervisor      Chief Complaint   Patient presents with   [address] [facility] [address] Patient Visit     Left foot ( posterior)       Wound Instructions:  Orders Placed This Encounter   Procedures    Wound cleansing and dressings     Wound:  Left foot ( posterior)  Discontinue previous wound order  Cleanse the wound bed with NSS  Apply non-sting skin prep to periwound area and wound bed  Frequency : daily and prn for soiling  Offload all wounds  Do not wear shoes that can cause pressure on the wound  Turn and reposition frequently, maximum of every two hours  Increase protein intake   Monitor for any sign of infection or worsening, inform PCP or patient's primary physician in your facility immediately  Standing Status:   Future     Standing Expiration Date:   6/8/2022       Allergies  Losartan, Sulfa antibiotics, Losartan, and Sulfa antibiotics      Assessment & Plan:  1  Pressure injury of left foot, stage 2 ([facility])  Assessment & Plan:  Left foot ( posterior)  - fluid filled blister  - local wound care - skin prep  - offload    Orders:  -     Wound cleansing and dressings; Future    2  Ambulatory dysfunction  Assessment & Plan:  - on PT/OT             Subjective: This is a 81year old female referred to our service for wound on the left foot ( posterior)  As per patient, she had the wound weeks ago  Etiology: possible pressure ulcer from the shoe  Severity : no sign of infection  Current treatment : betadine  Patient's care was coordinated with nursing facility staff  Recent vitals, labs and updated medications were reviewed on EMR or chart system of facility   Past Medical, surgical, social, medication and allergy history and patient's previous records were reviewed and updated as appropriate: Patient Active Problem List   Diagnosis    Absence of bladder continence    Allergic rhinitis    Apical variant hypertrophic cardiomyopathy ([place] [address] )    Essential hypertension    Hyperlipidemia    Osteoporosis    PAD (peripheral artery disease) ([facility]    Stenosis of carotid artery    Vitamin D deficiency    S/P cardiac catheterization    Multiple subsegmental pulmonary emboli without acute cor pulmonale ([facility])    New onset a-fib ([facility])    Hyponatremia    Volume overload    Bullous pemphigoid    Type 2 diabetes mellitus without complication, without long-term current use of insulin ([facility])    Hypokalemia    Syncope and collapse    Near syncope    RAYMUNDO (acute kidney injury) ([place] [address] )    Cardiomyopathy ([place] [address] )    History of pulmonary embolus (PE)    Type 2 diabetes mellitus, without long-term current use of insulin ([facility])    Bilateral carotid artery stenosis    CAD (coronary artery disease)    Atrial fibrillation ([facility])    Laceration of head, subsequent encounter    History of ventricular tachycardia    Fall    Bullous pemphigoid    Anemia    Pressure injury of left foot, stage 2 ([place] [address] )    Ambulatory dysfunction     Past Medical History:   Diagnosis Date    Abnormal heart rhythms     Cardiac disease     2 stents    Diabetes mellitus ([place] [address] )     borderline    Diabetes mellitus ([place] [address] )     Hyperlipidemia     Hypertension     Migraines     Stroke [facility])      Past Surgical History:   Procedure Laterality Date    COLONOSCOPY      DILATION AND CURETTAGE, DIAGNOSTIC / THERAPEUTIC      SKIN BIOPSY      TONSILLECTOMY      WISDOM TOOTH EXTRACTION      X3      Social History     Socioeconomic History    Marital status:       Spouse name: None    Number of children: 2    Years of education: None    Highest education level: None   Occupational History    None   Social Needs    Financial resource strain: None    Food insecurity     Worry: None     Inability: None    Transportation needs Medical: None     Non-medical: None   Tobacco Use    Smoking status: Never Smoker    Smokeless tobacco: Never Used    Tobacco comment: social smoker    Substance and Sexual Activity    Alcohol use: Not Currently     Comment: rare     Drug use: Never    Sexual activity: Not Currently   Lifestyle    Physical activity     Days per week: 0 days     Minutes per session: 0 min    Stress: None   Relationships    Social connections     Talks on phone: None     Gets together: None     Attends Rastafari service: None     Active member of club or organization: None     Attends meetings of clubs or organizations: None     Relationship status: None    Intimate partner violence     Fear of current or ex partner: None     Emotionally abused: None     Physically abused: None     Forced sexual activity: None   Other Topics Concern    None   Social History Narrative    ** Merged History Encounter **         Patient lives in a home that was built in 1947   Forced hot air/gas heat   Hardwood flaco in the bedroom with area rug   Window air conditioning   Finished basement-dry-musty smell-no mold   No dehumidifier   No air  or purifiers   No humidifi    er   Home is smoke free   Patient lives close to [place]ed area     No pets     Caffeine: 2 cups of hot tea daily                  Ice tea occasionally   Chocolate consumed occasionally (dark)     Patient lives alone         Current Outpatient Medications:     amiodarone (PACERONE) 400 MG tablet, Take 400 mg by mouth daily, Disp: , Rfl:     apixaban (ELIQUIS) 5 mg, Take 2 tablets (10 mg total) by mouth 2 (two) times a day for 7 days, THEN 1 tablet (5 mg total) 2 (two) times a day for 23 days  , Disp: 74 tablet, Rfl: 0    apixaban (Eliquis) 5 mg, Take 5 mg by mouth 2 (two) times a day, Disp: , Rfl:     aspirin (ECOTRIN LOW STRENGTH) 81 mg EC tablet, Take 81 mg by mouth daily, Disp: , Rfl:     ASPIRIN 81 PO, Take 81 mg by mouth, Disp: , Rfl:     brimonidine-timolol (COMBIGAN) 0 2-0 5 %, Apply 1 drop to eye every 12 (twelve) hours, Disp: , Rfl:     clobetasol (TEMOVATE) 0 05 % ointment, Apply topically 2 (two) times a day, Disp: 60 g, Rfl: 4    clopidogrel (PLAVIX) 75 mg tablet, Take 75 mg by mouth daily, Disp: , Rfl:     diltiazem (CARDIZEM CD) 120 mg 24 hr capsule, Take 1 capsule (120 mg total) by mouth daily (Patient not taking: Reported on 5/20/2021), Disp: 30 capsule, Rfl: 0    furosemide (LASIX) 20 mg tablet, Take 1 tablet (20 mg total) by mouth daily, Disp: , Rfl: 0    furosemide (LASIX) 40 mg tablet, Take 40 mg by mouth 2 (two) times a day, Disp: , Rfl:     hydrOXYzine HCL (ATARAX) 10 mg tablet, TAKE ONE TABLET BY MOUTH THREE TIMES A DAY AS NEEDED FOR ITCHING, Disp: , Rfl: 3    Latanoprost 0 005 % EMUL, Apply 1 drop to eye, Disp: , Rfl:     metFORMIN (GLUCOPHAGE) 500 mg tablet, Take 500 mg by mouth, Disp: , Rfl:     metFORMIN (GLUCOPHAGE) 500 mg tablet, Take 850 mg by mouth daily with breakfast , Disp: , Rfl:     metoprolol succinate (TOPROL-XL) 100 mg 24 hr tablet, Take 175 mg by mouth daily, Disp: , Rfl:     metoprolol tartrate (LOPRESSOR) 100 mg tablet, Take 1 tablet (100 mg total) by mouth every 12 (twelve) hours (Patient not taking: Reported on 5/20/2021), Disp: 60 tablet, Rfl: 0    mupirocin (BACTROBAN) 2 % ointment, APPLY TWICE DAILY TO SURGICAL SITE FOR 2 WEEKS, Disp: , Rfl: 0    mycophenolate (CELLCEPT) 500 mg tablet, TAKE ONE TABLET BY MOUTH TWICE A DAY FOR 1 WEEK  THEN INCREASE TO 2 TABLETS TWICE DAILY (Patient not taking: Reported on 5/20/2021), Disp: 160 tablet, Rfl: 3    potassium chloride (K-DUR,KLOR-CON) 10 mEq tablet, Take 10 mEq by mouth daily, Disp: , Rfl:     predniSONE 2 5 mg tablet, Take 7 5 mg of prednisone for 30 days; then we will drop to 5 mg for 30 days;  then 5 mg alternating with 2 5 mg for 30 days;  then its 5 every other day for 30 days   (Patient not taking: Reported on 5/20/2021), Disp: 195 tablet, Rfl: 0    predniSONE 2 5 mg tablet, Take 2 5 mg by mouth daily, Disp: , Rfl:     simvastatin (ZOCOR) 20 mg tablet, TAKE ONE-HALF TABLET BY MOUTH EVERY EVENING, Disp: , Rfl: 1    simvastatin (ZOCOR) 20 mg tablet, Take 20 mg by mouth daily at bedtime, Disp: , Rfl:     triamcinolone (KENALOG) 0 1 % ointment, Apply topically twice a day to affected areas (Patient not taking: Reported on 5/20/2021), Disp: 453 6 g, Rfl: 1  Family History   Problem Relation Age of Onset    Heart disease Father     Hypertension Sister     Lupus Sister     Cancer Daughter     Stroke Daughter     Dementia Mother     Heart attack Sister     No Known Problems Sister     Lupus Sister     Cataracts Sister     Hypertension Sister     Stroke Daughter     Neuropathy Daughter     Allergies Daughter     Cancer Daughter         Review of Systems   Constitutional: Negative  Negative for fatigue  HENT: Negative  Eyes: Negative  Respiratory: Negative  Cardiovascular: Negative for leg swelling  Gastrointestinal: Negative  Endocrine: Negative  Genitourinary: Negative  Musculoskeletal: Positive for gait problem  Skin: Positive for wound  See HPI   Neurological: Negative for dizziness and headaches  Psychiatric/Behavioral: Negative for behavioral problems  Objective: There were no vitals taken for this visit  Physical Exam  Constitutional:       Appearance: Normal appearance  HENT:      Head: Normocephalic and atraumatic  Nose: Nose normal       Mouth/Throat:      Mouth: Mucous membranes are moist    Eyes:      General:         Right eye: No discharge  Left eye: No discharge  Neck:      Musculoskeletal: Normal range of motion  Cardiovascular:      Rate and Rhythm: Normal rate  Pulses: Normal pulses  Pulmonary:      Effort: Pulmonary effort is normal    Abdominal:      Palpations: Abdomen is soft  Tenderness: There is no abdominal tenderness  There is no guarding     Musculoskeletal: General: No tenderness  Right lower leg: No edema  Left lower leg: No edema  Comments: LROM   Skin:     General: Skin is warm  Findings: Lesion present  Comments: Location Left foot wound bed - 0 5 x 0 5 x 0 1 cm , no undermining, no tunneling, intact fluid filled blister, exudate - no drainage, no malodor ( assess after dressing removal and cleansing), wound edge - attached to base, periwound - macerated  No localized sign of infection, Denies pain     Neurological:      Mental Status: She is alert and oriented to person, place, and time  Psychiatric:         Mood and Affect: Mood normal          Behavior: Behavior normal               Procedures             Coordination of Care: Unit manager aware of the treatment plan    Patient / Staff education : Patient  was given education on sign of infection and pressure ulcer prevention  Patient verbalized understanding     Follow up :  Return in about 1 week (around 6/15/2021)        ARLEEN Hayes

## 2021-06-08 NOTE — PATIENT INSTRUCTIONS
Orders Placed This Encounter   Procedures    Wound cleansing and dressings     Wound:  Left foot ( posterior)  Discontinue previous wound order  Cleanse the wound bed with NSS  Apply non-sting skin prep to periwound area and wound bed  Frequency : daily and prn for soiling  Offload all wounds  Do not wear shoes that can cause pressure on the wound  Turn and reposition frequently, maximum of every two hours  Increase protein intake   Monitor for any sign of infection or worsening, inform PCP or patient's primary physician in your facility immediately       Standing Status:   Future     Standing Expiration Date:   6/8/2022